# Patient Record
Sex: MALE | Race: WHITE | HISPANIC OR LATINO | Employment: FULL TIME | URBAN - METROPOLITAN AREA
[De-identification: names, ages, dates, MRNs, and addresses within clinical notes are randomized per-mention and may not be internally consistent; named-entity substitution may affect disease eponyms.]

---

## 2017-08-09 ENCOUNTER — TRANSCRIBE ORDERS (OUTPATIENT)
Dept: ADMINISTRATIVE | Facility: HOSPITAL | Age: 62
End: 2017-08-09

## 2017-08-09 ENCOUNTER — APPOINTMENT (OUTPATIENT)
Dept: LAB | Facility: HOSPITAL | Age: 62
End: 2017-08-09
Payer: COMMERCIAL

## 2017-08-09 DIAGNOSIS — Z00.8 HEALTH EXAMINATION IN POPULATION SURVEY: ICD-10-CM

## 2017-08-09 DIAGNOSIS — Z00.8 HEALTH EXAMINATION IN POPULATION SURVEY: Primary | ICD-10-CM

## 2017-08-09 LAB
CHOLEST SERPL-MCNC: 223 MG/DL (ref 50–200)
EST. AVERAGE GLUCOSE BLD GHB EST-MCNC: 131 MG/DL
HBA1C MFR BLD: 6.2 % (ref 4.2–6.3)
HDLC SERPL-MCNC: 37 MG/DL (ref 40–60)
LDLC SERPL CALC-MCNC: 155 MG/DL (ref 0–100)
TRIGL SERPL-MCNC: 153 MG/DL

## 2017-08-09 PROCEDURE — 36415 COLL VENOUS BLD VENIPUNCTURE: CPT | Performed by: PREVENTIVE MEDICINE

## 2017-08-09 PROCEDURE — 80061 LIPID PANEL: CPT

## 2017-08-09 PROCEDURE — 83036 HEMOGLOBIN GLYCOSYLATED A1C: CPT | Performed by: PREVENTIVE MEDICINE

## 2017-08-15 ENCOUNTER — ALLSCRIPTS OFFICE VISIT (OUTPATIENT)
Dept: OTHER | Facility: OTHER | Age: 62
End: 2017-08-15

## 2017-08-15 DIAGNOSIS — E04.1 NONTOXIC SINGLE THYROID NODULE: ICD-10-CM

## 2017-08-15 DIAGNOSIS — R68.82 DECREASED LIBIDO: ICD-10-CM

## 2017-08-18 ENCOUNTER — HOSPITAL ENCOUNTER (OUTPATIENT)
Dept: RADIOLOGY | Facility: HOSPITAL | Age: 62
Discharge: HOME | End: 2017-08-18
Payer: COMMERCIAL

## 2017-08-18 DIAGNOSIS — E04.1 NONTOXIC SINGLE THYROID NODULE: ICD-10-CM

## 2017-08-18 PROCEDURE — 76536 US EXAM OF HEAD AND NECK: CPT

## 2017-08-21 ENCOUNTER — GENERIC CONVERSION - ENCOUNTER (OUTPATIENT)
Dept: OTHER | Facility: OTHER | Age: 62
End: 2017-08-21

## 2017-08-25 ENCOUNTER — APPOINTMENT (OUTPATIENT)
Dept: LAB | Facility: HOSPITAL | Age: 62
End: 2017-08-25
Payer: COMMERCIAL

## 2017-08-25 ENCOUNTER — TRANSCRIBE ORDERS (OUTPATIENT)
Dept: ADMINISTRATIVE | Facility: HOSPITAL | Age: 62
End: 2017-08-25

## 2017-08-25 DIAGNOSIS — E04.1 NONTOXIC UNINODULAR GOITER: ICD-10-CM

## 2017-08-25 DIAGNOSIS — E04.1 NONTOXIC SINGLE THYROID NODULE: ICD-10-CM

## 2017-08-25 DIAGNOSIS — R68.82 DECREASED LIBIDO: ICD-10-CM

## 2017-08-25 DIAGNOSIS — E04.1 NONTOXIC UNINODULAR GOITER: Primary | ICD-10-CM

## 2017-08-25 LAB
PSA SERPL-MCNC: 2.4 NG/ML (ref 0–4)
TSH SERPL DL<=0.05 MIU/L-ACNC: 2.77 UIU/ML (ref 0.36–3.74)

## 2017-08-25 PROCEDURE — 36415 COLL VENOUS BLD VENIPUNCTURE: CPT

## 2017-08-25 PROCEDURE — 84402 ASSAY OF FREE TESTOSTERONE: CPT

## 2017-08-25 PROCEDURE — G0103 PSA SCREENING: HCPCS

## 2017-08-25 PROCEDURE — 84443 ASSAY THYROID STIM HORMONE: CPT

## 2017-08-25 PROCEDURE — 84403 ASSAY OF TOTAL TESTOSTERONE: CPT

## 2017-08-26 ENCOUNTER — GENERIC CONVERSION - ENCOUNTER (OUTPATIENT)
Dept: OTHER | Facility: OTHER | Age: 62
End: 2017-08-26

## 2017-08-26 LAB
TESTOST FREE SERPL-MCNC: 9.3 PG/ML (ref 6.6–18.1)
TESTOST SERPL-MCNC: 286 NG/DL (ref 264–916)

## 2017-10-31 ENCOUNTER — ALLSCRIPTS OFFICE VISIT (OUTPATIENT)
Dept: OTHER | Facility: OTHER | Age: 62
End: 2017-10-31

## 2017-11-01 NOTE — PROGRESS NOTES
Assessment  1  Decreased libido (799 81) (R68 82)   2  GERD (gastroesophageal reflux disease) (530 81) (K21 9)   3  Hyperlipidemia LDL goal <130 (272 4) (E78 5)   4  Prediabetes (790 29) (R73 03)   5  Situational anxiety (300 09) (F41 8)    Assessment plan 1  Decreased libido we did check the testosterone level that was normal number sign 2 GERD currently stable continue with omeprazole  3 hyperlipidemia I recommended the patient follow step 1 cardiac diet I would like the patient is check a lipid profile in 6 months 4 prediabetes check labs hemoglobin A1c and fasting blood sugar patient will get a flu shot at work next  5   Situational anxiety patient declines medical treatment and declines counseling no suicidal ideation he will monitor his symptoms if they worsen he will let me know and consider treatment RTO in 6 months call if any problems  Chief Complaint  Chief Complaint Chronic Condition [facility]: Patient is here today for follow up of chronic conditions described in HPI  History of Present Illness  HPI: 23-year-old male coming in for a follow-up examination hyperlipidemia, prediabetes and decreased libido; the patient reports me that cut down on sweets and exercising 3/week and walking at the hospital no er no hospitalization the pt reports on glucosamine for the last 1 5 yrs  No hospitalizations, no ER visits, patient reports me work stress he is concerned about downsizing this job and also stress at home his mom and after getting older and live in [place] a may need a nursing home in near future  No suicidal ideation no depression he does not want treatment for anxiety at this point time      Review of Systems  Complete-Male:   Constitutional: No fever or chills, feels well, no tiredness, no recent weight gain or weight loss  Cardiovascular: No complaints of slow heart rate, no fast heart rate, no chest pain, no palpitations, no leg claudication, no lower extremity     Respiratory: No complaints of shortness of breath, no wheezing, no cough, no SOB on exertion, no orthopnea or PND  Gastrointestinal: No complaints of abdominal pain, no constipation, no nausea or vomiting, no diarrhea or bloody stools  Genitourinary: No complaints of dysuria, no incontinence, no hesitancy, no nocturia, no genital lesion, no testicular pain  Psychiatric: anxiety, but-- not suicidal-- and-- no depression  ROS Reviewed:   ROS reviewed  Active Problems  1  BMI 27 0-27 9,adult (V85 23) (Z68 27)   2  Decreased libido (799 81) (R68 82)   3  Diabetes mellitus screening (V77 1) (Z13 1)   4  Encounter for prostate cancer screening (V76 44) (Z12 5)   5  Encounter for screening for malignant neoplasm of colon (V76 51) (Z12 11)   6  Erectile dysfunction of non-organic origin (302 72) (F52 21)   7  GERD (gastroesophageal reflux disease) (530 81) (K21 9)   8  Hyperlipidemia LDL goal <130 (272 4) (E78 5)   9  Normal routine physical examination (V70 0) (Z00 00)   10  Prediabetes (790 29) (R73 03)   11  Screening for cardiovascular condition (V81 2) (Z13 6)   12  Thyroid nodule (241 0) (E04 1)    Past Medical History  1  History of Elevated blood pressure   2  History of Elevated lipids (272 4) (E78 5)   3  History of abdominal pain (V13 89) (Z87 898)   4  History of benign neoplasm of colon (V12 79) (Z86 018)   5  History of chest pain (V13 89) (Z87 898)   6  History of heartburn (V12 79) (X77 295)  Active Problems And Past Medical History Reviewed: The active problems and past medical history were reviewed and updated today  Surgical History  1  History of Inguinal Hernia Repair   2  History of Tonsillectomy  Surgical History Reviewed: The surgical history was reviewed and updated today  Family History  Family History    1  Family history of hypertension (V17 49) (Z82 49)  Family History Reviewed: The family history was reviewed and updated today         Social History   · Dog   · Former smoker (F73 58) (Z87 891)   ·    · Occasional alcohol use   · Primary spoken language [place]  Social History Reviewed: The social history was reviewed and updated today  The social history was reviewed and is unchanged  Current Meds   1  Omeprazole 20 MG Oral Capsule Delayed Release; take 1 capsule daily as needed; Therapy: 36AAS2304 to (Evaluate:20Vzg2773)  Requested for: 68Xzz7942 Recorded  Medication List Reviewed: The medication list was reviewed and updated today  Allergies  1  No Known Drug Allergies  2  No Known Environmental Allergies   3  No Known Food Allergies    Vitals  Vital Signs    Recorded: 40RYQ2860 03:18PM   Heart Rate 78   Respiration 16   Systolic 484, LUE, Sitting   Diastolic 84, LUE, Sitting   Height 5 ft 8 in   Weight 181 lb 0 4 oz   BMI Calculated 27 52   BSA Calculated 1 96   O2 Saturation 96     Physical Exam    Constitutional   General appearance: No acute distress, well appearing and well nourished  Eyes   Conjunctiva and lids: No swelling, erythema, or discharge  Pupils and irises: Equal, round and reactive to light  Ears, Nose, Mouth, and Throat   External inspection of ears and nose: Normal     Otoscopic examination: Tympanic membrance translucent with normal light reflex  Canals patent without erythema  Nasal mucosa, septum, and turbinates: Normal without edema or erythema  Oropharynx: Normal with no erythema, edema, exudate or lesions  Pulmonary   Respiratory effort: No increased work of breathing or signs of respiratory distress  Auscultation of lungs: Clear to auscultation, equal breath sounds bilaterally, no wheezes, no rales, no rhonci  Cardiovascular   Auscultation of heart: Normal rate and rhythm, normal S1 and S2, without murmurs  Lymphatic   Palpation of lymph nodes in neck: No lymphadenopathy      Psychiatric   Mood and affect: Normal          Results/Data  (1) PSA (SCREEN) (Dx V76 44 Screen for Prostate Cancer) 93Lrl6201 07:05AM Tom Boo Order Number: [id number]     Test Name Result Flag Reference   PROSTATE SPECIFIC ANTIGEN 2 4 ng/mL  0 0-4 0   American Urological Association Guidelines define biochemical recurrence of prostate cancer as a detectable or rising PSA value post-radical prostatectomy that is greater than or equal to 0 2 ng/mL with a second confirmatory level of greater than or equal to 0 2 ng/mL  (1) TESTOSTERONE, FREE (DIRECT) AND TOTAL 62Hbi2342 07:05AM Skip Sang    Order Number: [id number]     Test Name Result Flag Reference   FREE TESTOSTERONE, DIRECT 9 3 pg/mL  6 6 - 18 1   TESTOSTERONE (TOTAL) 286 ng/dL  264 - 65   Adult male reference interval is based on a population of  healthy nonobese males (BMI <30) between 23and 44years old  [address] [place] [address], [address] [place] [address] [phone]  PMID: [id number]  Performed at:  [address] [place] [address]  [id number]  : Yadiel Pizarro MD, Phone:  [phone]     (1) TSH 76WXB1803 07:05AM [employer] Sang     Test Name Result Flag Reference   TSH 2 775 uIU/mL  0 358-3 740   Patients undergoing fluorescein dye angiography may retain small amounts of fluorescein in the body for 48-72 hours post procedure  Samples containing fluorescein can produce falsely depressed TSH values  If the patient had this procedure,a specimen should be resubmitted post fluorescein clearance  [address] [facility] THYROID 01MRH4720 12:54PM Tomsharri Haddad Order Number: [id number]    - Patient Instructions: To schedule this appointment, please contact Central Scheduling at [phone]  Test Name Result Flag Reference   US THYROID (Report)     THYROID ULTRASOUND     INDICATION: Possible thyroid enlargement on physical exam      COMPARISON: None       TECHNIQUE:  Ultrasound of the thyroid was performed with a high frequency linear transducer in transverse and sagittal planes including volumetric imaging sweeps as well as traditional still imaging technique  FINDINGS:   Normal homogeneous smooth echotexture  Right gland: 4 4 x 1 5 x 1 7 cm  No dominant nodules  Left gland: 4 3 x 1 3 x 1 7 cm  No dominant nodules  Isthmus: The isthmus is 0 3 cm in AP dimension  IMPRESSION:      Normal study  Workstation performed: [id number]     Signed by: Lise Dejesus MD   8/21/17     Health Management  Encounter for screening for malignant neoplasm of colon   COLONOSCOPY; every 3 years; Last 02FHV7384; Next Due: 10KJP7417;  Active    Signatures   Electronically signed by : Argelia Pike DO; Oct 31 2017  4:03PM EST                       (Author)

## 2018-01-10 NOTE — RESULT NOTES
Message   Notify the patient the ultrasound of the thyroid is normal Follow-up as scheduled to discuss report        Verified Results  US THYROID 45Kes2876 12:54PM Braden Mcfadden Order Number: [id number]    - Patient Instructions: To schedule this appointment, please contact Central Scheduling at [phone]  Test Name Result Flag Reference   US THYROID (Report)     THYROID ULTRASOUND     INDICATION: Possible thyroid enlargement on physical exam      COMPARISON: None  TECHNIQUE:  Ultrasound of the thyroid was performed with a high frequency linear transducer in transverse and sagittal planes including volumetric imaging sweeps as well as traditional still imaging technique  FINDINGS:   Normal homogeneous smooth echotexture  Right gland: 4 4 x 1 5 x 1 7 cm  No dominant nodules  Left gland: 4 3 x 1 3 x 1 7 cm  No dominant nodules  Isthmus: The isthmus is 0 3 cm in AP dimension  IMPRESSION:      Normal study  Workstation performed: [id number]     Signed by:    Nhi Palencia MD   8/21/17       Signatures   Electronically signed by : Marilee Valenzuela DO; Aug 21 2017  7:04PM EST                       (Author)

## 2018-01-10 NOTE — RESULT NOTES
Verified Results  (1) PSA (SCREEN) (Dx V76 44 Screen for Prostate Cancer) 75JVA5044 07:13AM Luis Carlos Castellon     Test Name Result Flag Reference   PROSTATE SPECIFIC ANTIGEN 2 5 ng/mL  0 0-4 0       Discussion/Summary   blood tests for prostate are normal

## 2018-01-13 VITALS
HEART RATE: 78 BPM | BODY MASS INDEX: 27.43 KG/M2 | RESPIRATION RATE: 16 BRPM | SYSTOLIC BLOOD PRESSURE: 110 MMHG | WEIGHT: 181.03 LBS | DIASTOLIC BLOOD PRESSURE: 84 MMHG | OXYGEN SATURATION: 96 % | HEIGHT: 68 IN

## 2018-01-13 NOTE — RESULT NOTES
Message   notify pt stable psa 2 4  f/u as scheduled        Verified Results  (1) PSA (SCREEN) (Dx V76 44 Screen for Prostate Cancer) [id number] 07:05AM Fay Lane Order Number: [id number]     Test Name Result Flag Reference   PROSTATE SPECIFIC ANTIGEN 2 4 ng/mL  0 0-4 0   American Urological Association Guidelines define biochemical recurrence of prostate cancer as a detectable or rising PSA value post-radical prostatectomy that is greater than or equal to 0 2 ng/mL with a second confirmatory level of greater than or equal to 0 2 ng/mL         Signatures   Electronically signed by : Tuan Van DO; Aug 26 2017  5:36PM EST                       (Author)

## 2018-01-14 NOTE — RESULT NOTES
Message   Notify the patient normal TSH follow up as scheduled        Verified Results  (1) PSA (SCREEN) (Dx V76 44 Screen for Prostate Cancer) 31Boh3217 07:05AM Mabel Topete Order Number: [id number]     Test Name Result Flag Reference   PROSTATE SPECIFIC ANTIGEN 2 4 ng/mL  0 0-4 0   American Urological Association Guidelines define biochemical recurrence of prostate cancer as a detectable or rising PSA value post-radical prostatectomy that is greater than or equal to 0 2 ng/mL with a second confirmatory level of greater than or equal to 0 2 ng/mL  (1) TESTOSTERONE, FREE (DIRECT) AND TOTAL 74Qof4800 07:05AM Roxane Dennis    Order Number: [id number]     Test Name Result Flag Reference   FREE TESTOSTERONE, DIRECT 9 3 pg/mL  6 6 - 18 1   TESTOSTERONE (TOTAL) 286 ng/dL  264 - 65   Adult male reference interval is based on a population of  healthy nonobese males (BMI <30) between 23and 44years old  [address] [place] [address] [phone]  PMID: [id number]  Performed at:  [address] [place] [address]  [id number]  : Sharon Valencia MD, Phone:  [phone]     (1) TSH [id number] 07:05AM Roxane Dennis     Test Name Result Flag Reference   TSH 2 775 uIU/mL  0 358-3 740   Patients undergoing fluorescein dye angiography may retain small amounts of fluorescein in the body for 48-72 hours post procedure  Samples containing fluorescein can produce falsely depressed TSH values  If the patient had this procedure,a specimen should be resubmitted post fluorescein clearance         Signatures   Electronically signed by : Colt Gonzalez DO; Aug 26 2017  5:37PM EST                       (Author)

## 2018-01-15 NOTE — PROGRESS NOTES
Assessment    1  Encounter for preventive health examination (V70 0) (Z00 00)   2  Prediabetes (790 29) (R73 03)   3  Thyroid nodule (241 0) (E04 1)   4  Decreased libido (799 81) (R68 82)    Assessment and plan #1 annual wellness examination completed per patient I'll be ordering the patient's comprehensive metabolic panel, hemoglobin A1c, screening PSA, patient is up-to-date on colonoscopy recommend flu vaccine in the fall  #2? Left thyroid nodule we'll check third generation TSH and ultrasound of the thyroid he does report a family history of thyroid nodule sister #3 prediabetes recommended a healthy balanced diet and carbohydrates and sweets recommended routine exercise check a hemoglobin A1c and comprehensive metabolic panel #4 patient reports decreased libido  Check free and total testosterone level RTO in 6 months call if any problems  Plan  Decreased libido    · (1) TESTOSTERONE, FREE (DIRECT) AND TOTAL; Status:Active; Requested  for:06Kdy6214;   Hyperlipidemia LDL goal <130, Prediabetes, Screening for cardiovascular condition    · (1) LIPID PANEL, FASTING; Status:Active; Requested for:64Kjq0408;   Prediabetes    · (1) COMPREHENSIVE METABOLIC PANEL; Status:Active; Requested for:33Bye5004;    · (1) HEMOGLOBIN A1C; Status:Active; Requested for:38Pnp5847; Thyroid nodule    · (1) PSA (SCREEN) (Dx V76 44 Screen for Prostate Cancer); Status:Active; Requested  for:02Hjo0460;    · (Q) TSH, 3RD GENERATION; Status:Active; Requested for:21Ukd7565;    · US THYROID; Status:Hold For - Scheduling; Requested for:36Mud8630;     Discussion/Summary  health maintenance visit Currently, he eats a healthy diet  Chief Complaint  New patient presents today for a wellness  History of Present Illness  HM, Adult Male: The last health maintenance visit was 1 year(s) ago  Social History: Household members include spouse  He is   Work status: working full time and occupation: [employer]   The patient has never smoked cigarettes  He reports occasional alcohol use and drinking 4 drinks per month  The patient has no concerns about alcohol abuse  He has never used illicit drugs  General Health: The patient's health since the last visit is described as good  He has regular dental visits  The patient brushes 4 time(s) a day, flosses 1 time(s) a day and reports his last dental visit was 07/2017  Dental problems: no tooth pain and no caries  He complains of vision problems  Vision care includes wearing glasses, having regular eye examinations and sept annual Dr Toyin Gomez  He denies hearing loss  Hearing is normal   He doesn't wear a hearing aid  Immunizations status: up to date  Lifestyle:  He does not have a healthy diet (eating too late and greasy stuff which helped the gerd uses omeprazole)  Dietary details include 1-2 servings of vegetables per day, 2-3 ounces of water per day and 2 cups of coffee per day  He has weight concerns  Weight control issues: overweight  He exercises regularly  He exercises 1-2 times per week for 45 minutes per session  Exercise includes walking  He does not use tobacco  The patient has never smoked cigarettes  He consumes alcohol  He reports occasional alcohol use and 4 per month  He typically drinks beer, but no wine consumption and no hard liquor consumption  Alcohol concern: The patient has no concerns about alcohol abuse  He denies drug use  He has never used illicit drugs  Reproductive health:  the patient is sexually active  He is monogamous with a female partner  birth control is not being practiced  He denies erectile dysfunction  Screening: Colorectal cancer screening includes a colonoscopy within the past ten years  Safety elements used: seat belt, safe driving habits, sunscreen, smoke detector and carbon monoxide detector, but no CPR training for the patient and no CPR training for household members     Risk findings: no passive smoke exposure, no anxiety symptoms, no depression symptoms, no travel to developing areas and no tuberculosis exposure  HPI: when check bp at [facility] bp elevated , needs new pcp      Active Problems    1  BMI 27 0-27 9,adult (V85 23) (Z68 27)   2  Diabetes mellitus screening (V77 1) (Z13 1)   3  Encounter for prostate cancer screening (V76 44) (Z12 5)   4  Encounter for screening for malignant neoplasm of colon (V76 51) (Z12 11)   5  Erectile dysfunction of non-organic origin (302 72) (F52 21)   6  GERD (gastroesophageal reflux disease) (530 81) (K21 9)   7  Hyperlipidemia LDL goal <130 (272 4) (E78 5)   8  Normal routine physical examination (V70 0) (Z00 00)   9  Screening for cardiovascular condition (V81 2) (Z13 6)    Past Medical History    · History of Elevated blood pressure   · History of Elevated lipids (272 4) (E78 5)   · History of abdominal pain (V13 89) (Z70 789)   · History of benign neoplasm of colon (V12 79) (Z86 018)   · History of chest pain (V13 89) (D19 637)   · History of heartburn (V12 79) (Z87 898)    Surgical History    · History of Inguinal Hernia Repair   · History of Tonsillectomy    Family History  Family History    · Family history of hypertension (V17 49) (Z82 49)    Social History    · Former smoker (V15 82) (U68 008)   ·    · Occasional alcohol use   · Primary spoken language English    Current Meds   1  Omeprazole 20 MG Oral Capsule Delayed Release; take 1 capsule daily as needed; Therapy: 96OGX6491 to (Evaluate:61Qtd8050)  Requested for: 60Xnw0876 Recorded    Allergies    1  No Known Drug Allergies    Vitals   Recorded: 67Ipd0785 04:20PM   Temperature 98 5 F   Heart Rate 67   Respiration 18   Systolic 082   Diastolic 88   Height 5 ft 8 in   Weight 183 lb 0 2 oz   BMI Calculated 27 83   BSA Calculated 1 97   O2 Saturation 98     Physical Exam    Constitutional   General appearance: No acute distress, well appearing and well nourished      Head and Face   Head and face: Normal     Eyes   Conjunctiva and lids: No erythema, swelling or discharge  Pupils and irises: Equal, round, reactive to light  Ears, Nose, Mouth, and Throat   External inspection of ears and nose: Normal     Otoscopic examination: Tympanic membranes translucent with normal light reflex  Canals patent without erythema  Hearing: Normal     Nasal mucosa, septum, and turbinates: Normal without edema or erythema  Lips, teeth, and gums: Normal, good dentition  Oropharynx: Normal with no erythema, edema, exudate or lesions  Neck   Neck: Supple, symmetric, trachea midline, no masses  Thyroid: Abnormal   ?1cm cm single nodule was palpated on the left  Pulmonary   Respiratory effort: No increased work of breathing or signs of respiratory distress  Auscultation of lungs: Clear to auscultation  Cardiovascular   Auscultation of heart: Normal rate and rhythm, normal S1 and S2, no murmurs  Pedal pulses: 2+ bilaterally  Examination of extremities for edema and/or varicosities: Normal     Abdomen   Abdomen: Non-tender, no masses  Liver and spleen: No hepatomegaly or splenomegaly  Lymphatic   Palpation of lymph nodes in neck: No lymphadenopathy  Psychiatric   Mood and affect: Normal        Results/Data  PHQ-2 Adult Depression Screening 82Hpz0259 04:21PM User, [facility]     Test Name Result Flag Reference   PHQ-2 Adult Depression Score 0     Over the last two weeks, how often have you been bothered by any of the following problems? Little interest or pleasure in doing things: Not at all - 0  Feeling down, depressed, or hopeless: Not at all - 0   PHQ-2 Adult Depression Screening Negative         Health Management  Encounter for screening for malignant neoplasm of colon   COLONOSCOPY; every 3 years; Last 16KMF0428; Next Due: 98XLW5960;  Active    Future Appointments    Date/Time Provider Specialty Site   10/31/2017 03:00 PM Joelle Campoverde DO [facility]     Signatures   Electronically signed by : Misha Triana DO; Aug 15 2017  9:21PM EST                       (Author)

## 2018-01-16 NOTE — RESULT NOTES
Message   Notify the patient normal testosterone level follow-up as scheduled        Verified Results  (1) PSA (SCREEN) (Dx V76 44 Screen for Prostate Cancer) 77Ybm9671 07:05AM Marlene Sensor Order Number: [id number]     Test Name Result Flag Reference   PROSTATE SPECIFIC ANTIGEN 2 4 ng/mL  0 0-4 0   American Urological Association Guidelines define biochemical recurrence of prostate cancer as a detectable or rising PSA value post-radical prostatectomy that is greater than or equal to 0 2 ng/mL with a second confirmatory level of greater than or equal to 0 2 ng/mL  (1) TESTOSTERONE, FREE (DIRECT) AND TOTAL 83Qgw5598 07:05AM Servando Valdivia   TW Order Number: [id number]     Test Name Result Flag Reference   FREE TESTOSTERONE, DIRECT 9 3 pg/mL  6 6 - 18 1   TESTOSTERONE (TOTAL) 286 ng/dL  264 - 65   Adult male reference interval is based on a population of  healthy nonobese males (BMI <30) between 23and 44years old  [address] [place] [address], [address] [place] [address] [phone]  PMID: [id number]      Performed at:  [address]  [id number]  : Johnny Humphrey MD, Phone:  [phone]       Signatures   Electronically signed by : Joe Veliz DO; Aug 26 2017  5:36PM EST                       (Author)

## 2018-01-17 NOTE — PROGRESS NOTES
Assessment    1  Normal routine physical examination (V70 0) (Z00 00)   2  BMI 27 0-27 9,adult (V85 23) (Z68 27)   3  Hyperlipidemia LDL goal <130 (272 4) (E78 5)    Plan  Encounter for prostate cancer screening    · (1) PSA (SCREEN) (Dx V76 44 Screen for Prostate Cancer); Status:Active; Requested  for:[id number];     Discussion/Summary  Impression: health maintenance visit  Currently, he eats a healthy diet  Prostate cancer screening: the risks and benefits of prostate cancer screening were discussed  Colorectal cancer screening: the risks and benefits of colorectal cancer screening were discussed  Advice and education were given regarding nutrition  OBTAIN BLOOD TEST FOR PROSTATE CANCER (PSA) SOON  The patient was counseled regarding diagnostic results, instructions for management, risk factor reductions, prognosis  Chief Complaint  Annual Physical      History of Present Illness  HPI: COLON POLyP   REPEAT Wcolonscopy pending  Review of Systems    Constitutional: No fever or chills, feels well, no tiredness, no recent weight gain or weight loss  Eyes: No complaints of eye pain, no red eyes, no discharge from eyes, no itchy eyes  ENT: no complaints of earache, no hearing loss, no nosebleeds, no nasal discharge, no sore throat, no hoarseness  Cardiovascular: No complaints of slow heart rate, no fast heart rate, no chest pain, no palpitations, no leg claudication, no lower extremity  Respiratory: No complaints of shortness of breath, no wheezing, no cough, no SOB on exertion, no orthopnea or PND  Gastrointestinal: No complaints of abdominal pain, no constipation, no nausea or vomiting, no diarrhea or bloody stools  Genitourinary: No complaints of dysuria, no incontinence, no hesitancy, no nocturia, no genital lesion, no testicular pain  Musculoskeletal: No complaints of arthralgia, no myalgias, no joint swelling or stiffness, no limb pain or swelling     Integumentary: No complaints of skin rash or skin lesions, no itching, no skin wound, no dry skin  Neurological: No compliants of headache, no confusion, no convulsions, no numbness or tingling, no dizziness or fainting, no limb weakness, no difficulty walking  Psychiatric: Is not suicidal, no sleep disturbances, no anxiety or depression, no change in personality, no emotional problems  Endocrine: No complaints of proptosis, no hot flashes, no muscle weakness, no erectile dysfunction, no deepening of the voice, no feelings of weakness  Hematologic/Lymphatic: No complaints of swollen glands, no swollen glands in the neck, does not bleed easily, no easy bruising  Active Problems    1  Diabetes mellitus screening (V77 1) (Z13 1)   2  Encounter for screening for malignant neoplasm of colon (V76 51) (Z12 11)   3  Erectile dysfunction of non-organic origin (302 72) (F52 21)   4  GERD (gastroesophageal reflux disease) (530 81) (K21 9)   5  Screening for cardiovascular condition (V81 2) (Z13 6)    Past Medical History    · History of Elevated blood pressure (401 9) (I10)   · History of Elevated lipids (272 4) (E78 5)   · History of abdominal pain (V13 89) (T06 076)   · History of benign neoplasm of colon (V12 79) (Z86 018)   · History of chest pain (V13 89) (A55 415)   · History of heartburn (V12 79) (Z87 898)    Social History    · Former smoker (U69 43) (T97 587)   ·    · Occasional alcohol use   · Primary spoken language English    Current Meds   1  Omeprazole 20 MG Oral Capsule Delayed Release; take 1 capsule daily as needed; Therapy: [id number] to (Evaluate:73Lei2989)  Requested for: 20Iev8608 Recorded    Allergies    1   No Known Drug Allergies    Vitals   Recorded: 92SMS6335 86:80WL   Systolic 415   Diastolic 84   Heart Rate 68   Respiration 18   Temperature 97 1 F   O2 Saturation 98   Height 5 ft 8 in   Weight 182 lb 4 oz   BMI Calculated 27 71   BSA Calculated 1 96     Physical Exam    Constitutional   General appearance: No acute distress, well appearing and well nourished  Eyes   Conjunctiva and lids: No swelling, erythema, or discharge  Pupils and irises: Equal, round and reactive to light  Ears, Nose, Mouth, and Throat   External inspection of ears and nose: Normal     Otoscopic examination: Tympanic membrance translucent with normal light reflex  Canals patent without erythema  Oropharynx: Normal with no erythema, edema, exudate or lesions  Pulmonary   Respiratory effort: No increased work of breathing or signs of respiratory distress  Auscultation of lungs: Clear to auscultation  Cardiovascular   Auscultation of heart: Normal rate and rhythm, normal S1 and S2, without murmurs  Examination of extremities for edema and/or varicosities: Normal     Abdomen   Abdomen: Non-tender, no masses  Lymphatic   Palpation of lymph nodes in neck: No lymphadenopathy  Musculoskeletal   Gait and station: Normal     Digits and nails: Normal without clubbing or cyanosis  Inspection/palpation of joints, bones, and muscles: Normal     Skin   Skin and subcutaneous tissue: Normal without rashes or lesions  Neurologic   Cranial nerves: Cranial nerves 2-12 intact  Reflexes: 2+ and symmetric  Sensation: No sensory loss  Psychiatric   Orientation to person, place and time: Normal     Mood and affect: Normal        Results/Data  (1) LIPID PANEL, FASTING 30Jun2016 07:36AM Sakina Rick     Test Name Result Flag Reference   CHOLESTEROL 238 mg/dL H    HDL,DIRECT 38 mg/dL L 40-60   Specimen collection should occur prior to Metamizole administration due to the potential for falsely depressed results     LDL CHOLESTEROL CALCULATED 165 mg/dL H 0-100   Triglyceride:         Normal              <150 mg/dl       Borderline High    150-199 mg/dl       High               200-499 mg/dl       Very High          >499 mg/dl  Cholesterol:         Desirable        <200 mg/dl      Borderline High  200-239 mg/dl      High >239 mg/dl  HDL Cholesterol:        High    >59 mg/dL      Low     <41 mg/dL  LDL CALCULATED:    This screening LDL is a calculated result  It does not have the accuracy of the Direct Measured LDL in the monitoring of patients with hyperlipidemia and/or statin therapy  Direct Measure LDL (FUB303) must be ordered separately in these patients  TRIGLYCERIDES 174 mg/dL H <=150   Specimen collection should occur prior to N-Acetylcysteine or Metamizole administration due to the potential for falsely depressed results  Procedure    Procedure:   Results: 20/25 in both eyes without corrective device, 20/40 in the right eye without corrective device, 20/40 in the left eye without corrective device      Health Management  Encounter for screening for malignant neoplasm of colon   COLONOSCOPY; every 3 years; Last [id number]; Next Due: [id number]; Active    Signatures   Electronically signed by :  Geovany Perry DO; Aug 11 2016  8:11AM EST                       (Author)

## 2018-01-22 VITALS
SYSTOLIC BLOOD PRESSURE: 120 MMHG | OXYGEN SATURATION: 98 % | HEIGHT: 68 IN | HEART RATE: 67 BPM | RESPIRATION RATE: 18 BRPM | DIASTOLIC BLOOD PRESSURE: 88 MMHG | WEIGHT: 183.01 LBS | TEMPERATURE: 98.5 F | BODY MASS INDEX: 27.74 KG/M2

## 2018-02-15 DIAGNOSIS — E78.5 HYPERLIPIDEMIA: ICD-10-CM

## 2018-02-15 DIAGNOSIS — Z13.6 ENCOUNTER FOR SCREENING FOR CARDIOVASCULAR DISORDERS: ICD-10-CM

## 2018-02-15 DIAGNOSIS — R73.03 PREDIABETES: ICD-10-CM

## 2018-04-27 ENCOUNTER — APPOINTMENT (OUTPATIENT)
Dept: LAB | Facility: HOSPITAL | Age: 63
End: 2018-04-27
Payer: COMMERCIAL

## 2018-04-27 ENCOUNTER — TRANSCRIBE ORDERS (OUTPATIENT)
Dept: ADMINISTRATIVE | Facility: HOSPITAL | Age: 63
End: 2018-04-27

## 2018-04-27 DIAGNOSIS — E78.5 HYPERLIPIDEMIA, UNSPECIFIED HYPERLIPIDEMIA TYPE: Primary | ICD-10-CM

## 2018-04-27 DIAGNOSIS — E78.5 HYPERLIPIDEMIA: ICD-10-CM

## 2018-04-27 DIAGNOSIS — R73.03 DIABETES MELLITUS, LATENT: ICD-10-CM

## 2018-04-27 DIAGNOSIS — Z13.6 ENCOUNTER FOR SCREENING FOR CARDIOVASCULAR DISORDERS: ICD-10-CM

## 2018-04-27 DIAGNOSIS — R73.03 PREDIABETES: ICD-10-CM

## 2018-04-27 LAB
ALBUMIN SERPL BCP-MCNC: 4.1 G/DL (ref 3.5–5)
ALP SERPL-CCNC: 68 U/L (ref 46–116)
ALT SERPL W P-5'-P-CCNC: 59 U/L (ref 12–78)
ANION GAP SERPL CALCULATED.3IONS-SCNC: 12 MMOL/L (ref 4–13)
AST SERPL W P-5'-P-CCNC: 28 U/L (ref 5–45)
BILIRUB SERPL-MCNC: 0.5 MG/DL (ref 0.2–1)
BUN SERPL-MCNC: 16 MG/DL (ref 5–25)
CALCIUM SERPL-MCNC: 9.3 MG/DL (ref 8.3–10.1)
CHLORIDE SERPL-SCNC: 106 MMOL/L (ref 100–108)
CHOLEST SERPL-MCNC: 244 MG/DL (ref 50–200)
CO2 SERPL-SCNC: 23 MMOL/L (ref 21–32)
CREAT SERPL-MCNC: 1.22 MG/DL (ref 0.6–1.3)
EST. AVERAGE GLUCOSE BLD GHB EST-MCNC: 131 MG/DL
GFR SERPL CREATININE-BSD FRML MDRD: 63 ML/MIN/1.73SQ M
GLUCOSE P FAST SERPL-MCNC: 116 MG/DL (ref 65–99)
HBA1C MFR BLD: 6.2 % (ref 4.2–6.3)
HDLC SERPL-MCNC: 33 MG/DL (ref 40–60)
LDLC SERPL CALC-MCNC: 179 MG/DL (ref 0–100)
NONHDLC SERPL-MCNC: 211 MG/DL
POTASSIUM SERPL-SCNC: 4 MMOL/L (ref 3.5–5.3)
PROT SERPL-MCNC: 8 G/DL (ref 6.4–8.2)
SODIUM SERPL-SCNC: 141 MMOL/L (ref 136–145)
TRIGL SERPL-MCNC: 162 MG/DL

## 2018-04-27 PROCEDURE — 36415 COLL VENOUS BLD VENIPUNCTURE: CPT | Performed by: INTERNAL MEDICINE

## 2018-04-27 PROCEDURE — 80061 LIPID PANEL: CPT

## 2018-04-27 PROCEDURE — 80053 COMPREHEN METABOLIC PANEL: CPT

## 2018-04-27 PROCEDURE — 83036 HEMOGLOBIN GLYCOSYLATED A1C: CPT | Performed by: INTERNAL MEDICINE

## 2018-05-03 RX ORDER — OMEPRAZOLE 20 MG/1
1 CAPSULE, DELAYED RELEASE ORAL DAILY PRN
COMMUNITY
Start: 2014-07-31 | End: 2018-09-11 | Stop reason: ALTCHOICE

## 2018-05-10 ENCOUNTER — OFFICE VISIT (OUTPATIENT)
Dept: INTERNAL MEDICINE CLINIC | Facility: CLINIC | Age: 63
End: 2018-05-10
Payer: COMMERCIAL

## 2018-05-10 VITALS
SYSTOLIC BLOOD PRESSURE: 140 MMHG | BODY MASS INDEX: 28.22 KG/M2 | OXYGEN SATURATION: 94 % | HEART RATE: 72 BPM | WEIGHT: 185.6 LBS | DIASTOLIC BLOOD PRESSURE: 80 MMHG

## 2018-05-10 DIAGNOSIS — R73.03 PREDIABETES: ICD-10-CM

## 2018-05-10 DIAGNOSIS — E78.5 HYPERLIPIDEMIA, UNSPECIFIED HYPERLIPIDEMIA TYPE: Primary | ICD-10-CM

## 2018-05-10 DIAGNOSIS — K21.9 GASTROESOPHAGEAL REFLUX DISEASE WITHOUT ESOPHAGITIS: ICD-10-CM

## 2018-05-10 PROCEDURE — 99214 OFFICE O/P EST MOD 30 MIN: CPT | Performed by: INTERNAL MEDICINE

## 2018-05-10 RX ORDER — ROSUVASTATIN CALCIUM 5 MG/1
5 TABLET, COATED ORAL DAILY
Qty: 30 TABLET | Refills: 3 | Status: SHIPPED | OUTPATIENT
Start: 2018-05-10 | End: 2019-05-22 | Stop reason: SDUPTHER

## 2018-05-13 PROBLEM — E78.5 HYPERLIPIDEMIA: Status: ACTIVE | Noted: 2018-05-13

## 2018-05-13 PROBLEM — E66.3 OVERWEIGHT (BMI 25.0-29.9): Status: ACTIVE | Noted: 2018-05-13

## 2018-05-13 PROBLEM — R73.03 PREDIABETES: Status: ACTIVE | Noted: 2018-05-13

## 2018-05-13 NOTE — ASSESSMENT & PLAN NOTE
Pre diabetes -I have counseled the patient to follow a healthy balanced diet, I have counseled patient reduce carbohydrates and sweets in the diet, I would like the patient exercise routinely  I will be checking hemoglobin A1c and comprehensive metabolic panel  Have counseled patient about the prevention of diabetes, and the risk of progression to type 2 diabetes

## 2018-08-17 ENCOUNTER — APPOINTMENT (OUTPATIENT)
Dept: LAB | Facility: HOSPITAL | Age: 63
End: 2018-08-17
Payer: COMMERCIAL

## 2018-08-17 ENCOUNTER — TRANSCRIBE ORDERS (OUTPATIENT)
Dept: ADMINISTRATIVE | Facility: HOSPITAL | Age: 63
End: 2018-08-17

## 2018-08-17 DIAGNOSIS — R73.03 PREDIABETES: ICD-10-CM

## 2018-08-17 DIAGNOSIS — E78.5 HYPERLIPIDEMIA, UNSPECIFIED HYPERLIPIDEMIA TYPE: ICD-10-CM

## 2018-08-17 DIAGNOSIS — Z00.8 HEALTH EXAMINATION IN POPULATION SURVEY: Primary | ICD-10-CM

## 2018-08-17 DIAGNOSIS — Z00.8 HEALTH EXAMINATION IN POPULATION SURVEY: ICD-10-CM

## 2018-08-17 LAB
ALBUMIN SERPL BCP-MCNC: 4 G/DL (ref 3.5–5)
ALP SERPL-CCNC: 58 U/L (ref 46–116)
ALT SERPL W P-5'-P-CCNC: 60 U/L (ref 12–78)
ANION GAP SERPL CALCULATED.3IONS-SCNC: 10 MMOL/L (ref 4–13)
AST SERPL W P-5'-P-CCNC: 31 U/L (ref 5–45)
BILIRUB SERPL-MCNC: 0.5 MG/DL (ref 0.2–1)
BUN SERPL-MCNC: 17 MG/DL (ref 5–25)
CALCIUM SERPL-MCNC: 8.7 MG/DL (ref 8.3–10.1)
CHLORIDE SERPL-SCNC: 105 MMOL/L (ref 100–108)
CHOLEST SERPL-MCNC: 176 MG/DL (ref 50–200)
CO2 SERPL-SCNC: 24 MMOL/L (ref 21–32)
CREAT SERPL-MCNC: 1.24 MG/DL (ref 0.6–1.3)
EST. AVERAGE GLUCOSE BLD GHB EST-MCNC: 128 MG/DL
GFR SERPL CREATININE-BSD FRML MDRD: 62 ML/MIN/1.73SQ M
GLUCOSE P FAST SERPL-MCNC: 111 MG/DL (ref 65–99)
HBA1C MFR BLD: 6.1 % (ref 4.2–6.3)
HDLC SERPL-MCNC: 37 MG/DL (ref 40–60)
LDLC SERPL CALC-MCNC: 112 MG/DL (ref 0–100)
POTASSIUM SERPL-SCNC: 4 MMOL/L (ref 3.5–5.3)
PROT SERPL-MCNC: 7.6 G/DL (ref 6.4–8.2)
SODIUM SERPL-SCNC: 139 MMOL/L (ref 136–145)
TRIGL SERPL-MCNC: 133 MG/DL

## 2018-08-17 PROCEDURE — 80061 LIPID PANEL: CPT

## 2018-08-17 PROCEDURE — 80053 COMPREHEN METABOLIC PANEL: CPT

## 2018-08-17 PROCEDURE — 36415 COLL VENOUS BLD VENIPUNCTURE: CPT

## 2018-08-17 PROCEDURE — 83036 HEMOGLOBIN GLYCOSYLATED A1C: CPT

## 2018-09-11 ENCOUNTER — OFFICE VISIT (OUTPATIENT)
Dept: INTERNAL MEDICINE CLINIC | Facility: CLINIC | Age: 63
End: 2018-09-11
Payer: COMMERCIAL

## 2018-09-11 VITALS
SYSTOLIC BLOOD PRESSURE: 120 MMHG | BODY MASS INDEX: 30.09 KG/M2 | DIASTOLIC BLOOD PRESSURE: 92 MMHG | OXYGEN SATURATION: 96 % | HEART RATE: 69 BPM | TEMPERATURE: 98.4 F | HEIGHT: 65 IN | WEIGHT: 180.6 LBS

## 2018-09-11 DIAGNOSIS — E66.3 OVERWEIGHT (BMI 25.0-29.9): ICD-10-CM

## 2018-09-11 DIAGNOSIS — Z23 NEED FOR TDAP VACCINATION: ICD-10-CM

## 2018-09-11 DIAGNOSIS — Z12.11 SCREENING FOR MALIGNANT NEOPLASM OF COLON: ICD-10-CM

## 2018-09-11 DIAGNOSIS — E78.5 HYPERLIPIDEMIA, UNSPECIFIED HYPERLIPIDEMIA TYPE: ICD-10-CM

## 2018-09-11 DIAGNOSIS — K21.9 GASTROESOPHAGEAL REFLUX DISEASE WITHOUT ESOPHAGITIS: ICD-10-CM

## 2018-09-11 DIAGNOSIS — R73.03 PREDIABETES: Primary | ICD-10-CM

## 2018-09-11 PROCEDURE — 3008F BODY MASS INDEX DOCD: CPT | Performed by: INTERNAL MEDICINE

## 2018-09-11 PROCEDURE — 90715 TDAP VACCINE 7 YRS/> IM: CPT

## 2018-09-11 PROCEDURE — 90471 IMMUNIZATION ADMIN: CPT

## 2018-09-11 PROCEDURE — 99214 OFFICE O/P EST MOD 30 MIN: CPT | Performed by: INTERNAL MEDICINE

## 2018-09-11 NOTE — PROGRESS NOTES
Assessment/Plan:           Problem List Items Addressed This Visit        Digestive    GERD (gastroesophageal reflux disease)     Currently minimal symptoms and very infrequently will discontinue the Prilosec he does not have a history of Belcher's he may use over-the-counter Zantac 150 milligrams once a day as needed recommend ulcer free diet  Relevant Orders    Ambulatory referral to Gastroenterology       Other    Hyperlipidemia     Hyperlipidemia controlled continue with current medical regiment recommend a low-cholesterol diet and recommend routine exercise we will continue to monitor the progress  Relevant Orders    Lipid Panel with Direct LDL reflex    Prediabetes - Primary     Pre diabetes -I have counseled the patient to follow a healthy balanced diet, I have counseled patient reduce carbohydrates and sweets in the diet, I would like the patient exercise routinely  I will be checking hemoglobin A1c and comprehensive metabolic panel  Have counseled patient about the prevention of diabetes, and the risk of progression to type 2 diabetes     Review the laboratories         Relevant Orders    Hemoglobin A1C    Overweight (BMI 25 0-29  9)     I have counselled the pt to follow a healthy and balanced diet ,and recommend routine exercise  Relevant Orders    Comprehensive metabolic panel    Lipid Panel with Direct LDL reflex      Other Visit Diagnoses     Need for Tdap vaccination        Relevant Orders    Tdap vaccine greater than or equal to 8yo IM (Completed)    Screening for malignant neoplasm of colon        Relevant Orders    Ambulatory referral to Gastroenterology          Return to office 6  months  call if any problems  Subjective:      Patient ID: Amrita Ortiz is a 59 y o  male  HPI 60-year old male coming in for a follow up visit regarding prediabetes, overweight, hyperlipidemia, GERD, need for Tdap, screening for colon cancer;  The patient reports me compliant taking medications without untoward side effects the  The patient is here to review his medical condition, update me on the medical condition and the patient reports me no hospitalizations and no ER visits  He is here for review of laboratories  He reports me improvements in his diet, he continues to remain active  Overall he is feeling well he plans to get his flu shot at work  Reports me his symptoms of GERD are very minimal and infrequent  He has never had Belcher's esophagus in the past     The following portions of the patient's history were reviewed and updated as appropriate: allergies, current medications, past family history, past medical history, past social history, past surgical history and problem list     Review of Systems   Constitutional: Negative for activity change, appetite change and unexpected weight change  HENT: Negative for congestion and postnasal drip  Eyes: Negative for visual disturbance  Respiratory: Negative for cough and shortness of breath  Cardiovascular: Negative for chest pain  Gastrointestinal: Negative for abdominal pain, diarrhea, nausea and vomiting  Neurological: Negative for dizziness, light-headedness and headaches  Hematological: Negative for adenopathy  Objective:                    No Follow-up on file  No Known Allergies    Past Medical History:   Diagnosis Date    Benign neoplasm of colon      Past Surgical History:   Procedure Laterality Date    INGUINAL HERNIA REPAIR      TONSILLECTOMY       Current Outpatient Prescriptions on File Prior to Visit   Medication Sig Dispense Refill    rosuvastatin (CRESTOR) 5 mg tablet Take 1 tablet (5 mg total) by mouth daily 30 tablet 3    [DISCONTINUED] omeprazole (PriLOSEC) 20 mg delayed release capsule Take 1 capsule by mouth daily as needed       No current facility-administered medications on file prior to visit        Family History   Problem Relation Age of Onset    Hypertension Family Social History     Social History    Marital status: /Civil Union     Spouse name: N/A    Number of children: N/A    Years of education: N/A     Occupational History    Not on file  Social History Main Topics    Smoking status: Never Smoker    Smokeless tobacco: Never Used    Alcohol use Yes      Comment: occasional     Drug use: Unknown    Sexual activity: Not on file     Other Topics Concern    Not on file     Social History Narrative    Dog      Vitals:    09/11/18 1639   BP: 120/92   Pulse: 69   Temp: 98 4 °F (36 9 °C)   SpO2: 96%   Weight: 81 9 kg (180 lb 9 6 oz)   Height: 5' 5" (1 651 m)     Results for orders placed or performed in visit on 08/17/18   Hemoglobin A1C   Result Value Ref Range    Hemoglobin A1C 6 1 4 2 - 6 3 %     mg/dl     Weight (last 2 days)     Date/Time   Weight    09/11/18 1639  81 9 (180 6)            Body mass index is 30 05 kg/m²  BP      Temp      Pulse     Resp      SpO2        Vitals:    09/11/18 1639   Weight: 81 9 kg (180 lb 9 6 oz)     Vitals:    09/11/18 1639   Weight: 81 9 kg (180 lb 9 6 oz)       /92   Pulse 69   Temp 98 4 °F (36 9 °C)   Ht 5' 5" (1 651 m)   Wt 81 9 kg (180 lb 9 6 oz)   SpO2 96%   BMI 30 05 kg/m²          Physical Exam   Constitutional: He appears well-developed and well-nourished  No distress  HENT:   Head: Normocephalic and atraumatic  Right Ear: External ear normal    Left Ear: External ear normal    Mouth/Throat: Oropharynx is clear and moist    Eyes: Conjunctivae are normal  Pupils are equal, round, and reactive to light  Right eye exhibits no discharge  Left eye exhibits no discharge  No scleral icterus  Neck: Neck supple  Cardiovascular: Normal rate, regular rhythm and normal heart sounds  Exam reveals no gallop and no friction rub  No murmur heard  Pulmonary/Chest: No respiratory distress  He has no wheezes  He has no rales  Abdominal: Soft   Bowel sounds are normal  He exhibits no distension and no mass  There is no tenderness  There is no rebound and no guarding  Musculoskeletal: He exhibits no edema or deformity  Lymphadenopathy:     He has no cervical adenopathy  Neurological: He is alert  Skin: He is not diaphoretic  Psychiatric: He has a normal mood and affect

## 2018-09-12 NOTE — ASSESSMENT & PLAN NOTE
Pre diabetes -I have counseled the patient to follow a healthy balanced diet, I have counseled patient reduce carbohydrates and sweets in the diet, I would like the patient exercise routinely  I will be checking hemoglobin A1c and comprehensive metabolic panel  Have counseled patient about the prevention of diabetes, and the risk of progression to type 2 diabetes     Review the laboratories

## 2018-09-12 NOTE — ASSESSMENT & PLAN NOTE
Currently minimal symptoms and very infrequently will discontinue the Prilosec he does not have a history of Belcher's he may use over-the-counter Zantac 150 milligrams once a day as needed recommend ulcer free diet

## 2018-11-21 ENCOUNTER — OFFICE VISIT (OUTPATIENT)
Dept: GASTROENTEROLOGY | Facility: AMBULARY SURGERY CENTER | Age: 63
End: 2018-11-21
Payer: COMMERCIAL

## 2018-11-21 VITALS
TEMPERATURE: 98.5 F | HEART RATE: 75 BPM | BODY MASS INDEX: 30.12 KG/M2 | DIASTOLIC BLOOD PRESSURE: 78 MMHG | WEIGHT: 180.8 LBS | SYSTOLIC BLOOD PRESSURE: 122 MMHG | HEIGHT: 65 IN

## 2018-11-21 DIAGNOSIS — K21.9 GASTROESOPHAGEAL REFLUX DISEASE WITHOUT ESOPHAGITIS: ICD-10-CM

## 2018-11-21 DIAGNOSIS — Z12.11 SCREENING FOR MALIGNANT NEOPLASM OF COLON: ICD-10-CM

## 2018-11-21 DIAGNOSIS — Z86.010 HISTORY OF COLON POLYPS: Primary | ICD-10-CM

## 2018-11-21 PROBLEM — Z86.0100 HISTORY OF COLON POLYPS: Status: ACTIVE | Noted: 2018-11-21

## 2018-11-21 PROCEDURE — 99244 OFF/OP CNSLTJ NEW/EST MOD 40: CPT | Performed by: INTERNAL MEDICINE

## 2018-11-21 NOTE — PROGRESS NOTES
Consultation - [address] [facility]  [facility] 1955 male         Chief Complaint:  History of colon polyps    HPI:  59-year-old male with history of hyperlipidemia had large mid ascending colon polyp removed back in 2014  He had repeat colonoscopy at that time for complete polypectomy  He also had upper endoscopy at that time  He had another colonoscopy in 2015 and had couple of polyps removed  Patient has regular bowel movements and denies any blood or mucus in the stool  Appetite is good and denies any recent weight loss  Denies any abdominal pain, nausea, or vomiting  Has occasional heartburn and he takes Tums as needed  Denies any difficulty swallowing  REVIEW OF SYSTEMS: Review of Systems   Constitutional: Negative for activity change, appetite change, chills, diaphoresis, fatigue, fever and unexpected weight change  HENT: Negative for ear discharge, ear pain, facial swelling, hearing loss, nosebleeds, sore throat, tinnitus and voice change  Eyes: Negative for pain, discharge, redness, itching and visual disturbance  Respiratory: Negative for apnea, cough, chest tightness, shortness of breath and wheezing  Cardiovascular: Negative for chest pain and palpitations  Gastrointestinal:        As noted in HPI   Endocrine: Negative for cold intolerance, heat intolerance and polyuria  Genitourinary: Negative for difficulty urinating, dysuria, flank pain, hematuria and urgency  Musculoskeletal: Negative for arthralgias, back pain, gait problem, joint swelling and myalgias  Skin: Negative for rash and wound  Neurological: Negative for dizziness, tremors, seizures, speech difficulty, light-headedness, numbness and headaches  Hematological: Negative for adenopathy  Does not bruise/bleed easily  Psychiatric/Behavioral: Negative for agitation, behavioral problems and confusion  The patient is not nervous/anxious           Past Medical History:   Diagnosis Date  Benign neoplasm of colon       Past Surgical History:   Procedure Laterality Date    INGUINAL HERNIA REPAIR      TONSILLECTOMY       Social History     Social History    Marital status: /Civil Union     Spouse name: N/A    Number of children: N/A    Years of education: N/A     Occupational History    Not on file  Social History Main Topics    Smoking status: Never Smoker    Smokeless tobacco: Never Used    Alcohol use Yes      Comment: occasional     Drug use: No    Sexual activity: Not on file     Other Topics Concern    Not on file     Social History Narrative    Dog      Family History   Problem Relation Age of Onset    Hypertension Family     Parkinsonism Father      Patient has no known allergies  Current Outpatient Prescriptions   Medication Sig Dispense Refill    rosuvastatin (CRESTOR) 5 mg tablet Take 1 tablet (5 mg total) by mouth daily 30 tablet 3    Na Sulfate-K Sulfate-Mg Sulf (SUPREP BOWEL PREP KIT) 17 5-3 13-1 6 GM/177ML SOLN Take 2 Bottles by mouth see administration instructions Please follow the instructions from the office 2 Bottle 0     No current facility-administered medications for this visit  Blood pressure 122/78, pulse 75, temperature 98 5 °F (36 9 °C), temperature source Tympanic, height 5' 5" (1 651 m), weight 82 kg (180 lb 12 8 oz)  PHYSICAL EXAM: Physical Exam   Constitutional: He is oriented to person, place, and time  He appears well-developed  HENT:   Head: Normocephalic and atraumatic  Mouth/Throat: Oropharynx is clear and moist    Eyes: Pupils are equal, round, and reactive to light  Conjunctivae are normal  Right eye exhibits no discharge  Left eye exhibits no discharge  No scleral icterus  Neck: Neck supple  No JVD present  No tracheal deviation present  No thyromegaly present  Cardiovascular: Normal rate, regular rhythm, normal heart sounds and intact distal pulses  Exam reveals no gallop and no friction rub      No murmur heard   Pulmonary/Chest: Effort normal and breath sounds normal  No respiratory distress  He has no wheezes  He has no rales  He exhibits no tenderness  Abdominal: Soft  Bowel sounds are normal  He exhibits no distension and no mass  There is no tenderness  There is no rebound and no guarding  No hernia  Musculoskeletal: He exhibits no edema  Lymphadenopathy:     He has no cervical adenopathy  Neurological: He is alert and oriented to person, place, and time  Skin: Skin is warm and dry  No rash noted  No erythema  Psychiatric: He has a normal mood and affect  His behavior is normal  Thought content normal         No results found for: WBC, HGB, HCT, MCV, PLT  Lab Results   Component Value Date    CALCIUM 8 7 08/17/2018    K 4 0 08/17/2018    CO2 24 08/17/2018     08/17/2018    BUN 17 08/17/2018    CREATININE 1 24 08/17/2018     Lab Results   Component Value Date    ALT 60 08/17/2018    AST 31 08/17/2018    ALKPHOS 58 08/17/2018     No results found for: INR, PROTIME    Us Thyroid    Result Date: 8/21/2017  Impression:  Normal study  Workstation performed: [id number]       ASSESSMENT & PLAN:    History of colon polyps  Personal history of colon polyps- patient is at increased risk for colon cancer screening  Rule out colorectal lesions including polyps or malignancy     -Schedule for colonoscopy  -High-fiber diet     -Patient was given instructions about the colonoscopy prep     -Patient was explained about  the risks and benefits of the procedure  Risks including but not limited to bleeding, infection, perforation were explained in detail  Also explained about less than 100% sensitivity with the exam and other alternatives  GERD (gastroesophageal reflux disease)  Gastroesophageal reflux disease - Patient has the symptoms of chronic acid reflux for a long time  Possible hiatal hernia or LES weakness     He had negative upper endoscopy before     -Patient was explained about the lifestyle and dietary modifications  Advised to avoid fatty foods, chocolates, caffeine, alcohol and any other triggering foods    Avoid eating for at least 3 hours before going to bed         -may take Pepcid or Zantac as needed

## 2018-11-21 NOTE — LETTER
November 21, 2018     Yrn Jordan [facility] [id number]    Patient: Johanne Shrestha   YOB: 1955   Date of Visit: 11/21/2018       Dear Dr Cher Gill:    Thank you for referring Kareem Echols to me for evaluation  Below are my notes for this consultation  If you have questions, please do not hesitate to call me  I look forward to following your patient along with you  Sincerely,        Isaiah Sullivan MD        CC: No Recipients  Isaiah Sullivan MD  11/21/2018  4:51 PM  Sign at close encounter  Consultation - [address] [facility]  Johanne Shrestha 1955 male         Chief Complaint:  History of colon polyps    HPI:  62-year-old male with history of hyperlipidemia had large mid ascending colon polyp removed back in 2014  He had repeat colonoscopy at that time for complete polypectomy  He also had upper endoscopy at that time  He had another colonoscopy in 2015 and had couple of polyps removed  Patient has regular bowel movements and denies any blood or mucus in the stool  Appetite is good and denies any recent weight loss  Denies any abdominal pain, nausea, or vomiting  Has occasional heartburn and he takes Tums as needed  Denies any difficulty swallowing  REVIEW OF SYSTEMS: Review of Systems   Constitutional: Negative for activity change, appetite change, chills, diaphoresis, fatigue, fever and unexpected weight change  HENT: Negative for ear discharge, ear pain, facial swelling, hearing loss, nosebleeds, sore throat, tinnitus and voice change  Eyes: Negative for pain, discharge, redness, itching and visual disturbance  Respiratory: Negative for apnea, cough, chest tightness, shortness of breath and wheezing  Cardiovascular: Negative for chest pain and palpitations  Gastrointestinal:        As noted in HPI   Endocrine: Negative for cold intolerance, heat intolerance and polyuria     Genitourinary: Negative for difficulty urinating, dysuria, flank pain, hematuria and urgency  Musculoskeletal: Negative for arthralgias, back pain, gait problem, joint swelling and myalgias  Skin: Negative for rash and wound  Neurological: Negative for dizziness, tremors, seizures, speech difficulty, light-headedness, numbness and headaches  Hematological: Negative for adenopathy  Does not bruise/bleed easily  Psychiatric/Behavioral: Negative for agitation, behavioral problems and confusion  The patient is not nervous/anxious  Past Medical History:   Diagnosis Date    Benign neoplasm of colon       Past Surgical History:   Procedure Laterality Date    INGUINAL HERNIA REPAIR      TONSILLECTOMY       Social History     Social History    Marital status: /Civil Union     Spouse name: N/A    Number of children: N/A    Years of education: N/A     Occupational History    Not on file  Social History Main Topics    Smoking status: Never Smoker    Smokeless tobacco: Never Used    Alcohol use Yes      Comment: occasional     Drug use: No    Sexual activity: Not on file     Other Topics Concern    Not on file     Social History Narrative    Dog      Family History   Problem Relation Age of Onset    Hypertension Family     Parkinsonism Father      Patient has no known allergies  Current Outpatient Prescriptions   Medication Sig Dispense Refill    rosuvastatin (CRESTOR) 5 mg tablet Take 1 tablet (5 mg total) by mouth daily 30 tablet 3    Na Sulfate-K Sulfate-Mg Sulf (SUPREP BOWEL PREP KIT) 17 5-3 13-1 6 GM/177ML SOLN Take 2 Bottles by mouth see administration instructions Please follow the instructions from the office 2 Bottle 0     No current facility-administered medications for this visit  Blood pressure 122/78, pulse 75, temperature 98 5 °F (36 9 °C), temperature source Tympanic, height 5' 5" (1 651 m), weight 82 kg (180 lb 12 8 oz)      PHYSICAL EXAM: Physical Exam   Constitutional: He is oriented to person, place, and time  He appears well-developed  HENT:   Head: Normocephalic and atraumatic  Mouth/Throat: Oropharynx is clear and moist    Eyes: Pupils are equal, round, and reactive to light  Conjunctivae are normal  Right eye exhibits no discharge  Left eye exhibits no discharge  No scleral icterus  Neck: Neck supple  No JVD present  No tracheal deviation present  No thyromegaly present  Cardiovascular: Normal rate, regular rhythm, normal heart sounds and intact distal pulses  Exam reveals no gallop and no friction rub  No murmur heard  Pulmonary/Chest: Effort normal and breath sounds normal  No respiratory distress  He has no wheezes  He has no rales  He exhibits no tenderness  Abdominal: Soft  Bowel sounds are normal  He exhibits no distension and no mass  There is no tenderness  There is no rebound and no guarding  No hernia  Musculoskeletal: He exhibits no edema  Lymphadenopathy:     He has no cervical adenopathy  Neurological: He is alert and oriented to person, place, and time  Skin: Skin is warm and dry  No rash noted  No erythema  Psychiatric: He has a normal mood and affect  His behavior is normal  Thought content normal         No results found for: WBC, HGB, HCT, MCV, PLT  Lab Results   Component Value Date    CALCIUM 8 7 08/17/2018    K 4 0 08/17/2018    CO2 24 08/17/2018     08/17/2018    BUN 17 08/17/2018    CREATININE 1 24 08/17/2018     Lab Results   Component Value Date    ALT 60 08/17/2018    AST 31 08/17/2018    ALKPHOS 58 08/17/2018     No results found for: INR, PROTIME    Us Thyroid    Result Date: 8/21/2017  Impression:  Normal study  Workstation performed: [id number]       ASSESSMENT & PLAN:    History of colon polyps  Personal history of colon polyps- patient is at increased risk for colon cancer screening  Rule out colorectal lesions including polyps or malignancy     -Schedule for colonoscopy      -High-fiber diet     -Patient was given instructions about the colonoscopy prep     -Patient was explained about  the risks and benefits of the procedure  Risks including but not limited to bleeding, infection, perforation were explained in detail  Also explained about less than 100% sensitivity with the exam and other alternatives  GERD (gastroesophageal reflux disease)  Gastroesophageal reflux disease - Patient has the symptoms of chronic acid reflux for a long time  Possible hiatal hernia or LES weakness  He had negative upper endoscopy before     -Patient was explained about the lifestyle and dietary modifications  Advised to avoid fatty foods, chocolates, caffeine, alcohol and any other triggering foods    Avoid eating for at least 3 hours before going to bed         -may take Pepcid or Zantac as needed

## 2018-11-21 NOTE — ASSESSMENT & PLAN NOTE
Gastroesophageal reflux disease - Patient has the symptoms of chronic acid reflux for a long time  Possible hiatal hernia or LES weakness  He had negative upper endoscopy before     -Patient was explained about the lifestyle and dietary modifications  Advised to avoid fatty foods, chocolates, caffeine, alcohol and any other triggering foods    Avoid eating for at least 3 hours before going to bed         -may take Pepcid or Zantac as needed

## 2018-11-21 NOTE — PROGRESS NOTES
Assessment/Plan:           Problem List Items Addressed This Visit     Hyperlipidemia - Primary     Hyperlipidemia controlled continue with current medical regiment recommend a low-cholesterol diet and recommend routine exercise we will continue to monitor the progress  Relevant Medications    rosuvastatin (CRESTOR) 5 mg tablet    Other Relevant Orders    Lipid Panel with Direct LDL reflex    Prediabetes     Pre diabetes -I have counseled the patient to follow a healthy balanced diet, I have counseled patient reduce carbohydrates and sweets in the diet, I would like the patient exercise routinely  I will be checking hemoglobin A1c and comprehensive metabolic panel  Have counseled patient about the prevention of diabetes, and the risk of progression to type 2 diabetes  Relevant Orders    Ambulatory referral to Diabetic Education    Comprehensive metabolic panel    GERD (gastroesophageal reflux disease)     GERD controlled continue with omeprazole 20 mg once daily, ulcer free diet               Return to office  6 months  call if any problems  Subjective:      Patient ID: José Luis Strickland is a 59 y o  male  HPI [age]-year old male coming in for a follow up visit regarding GERD, hyperlipidemia, overweight prediabetes; The patient reports me compliant taking medications without untoward side effects the  The patient is here to review his medical condition, update me on the medical condition and the patient reports me no hospitalizations and no ER visits  He does report me trying to follow healthy imbalance diet  S reflux currently stable on omeprazole  He is here to review laboratories    Aishwarya 10 going to [facility]  The following portions of the patient's history were reviewed and updated as appropriate: allergies, current medications, past family history, past social history, past surgical history and problem list     Review of Systems   Constitutional: Negative for activity change, appetite Jason change and unexpected weight change  HENT: Negative for dental problem and postnasal drip  Eyes: Negative for visual disturbance  Respiratory: Negative for cough and shortness of breath  Cardiovascular: Negative for chest pain  Gastrointestinal: Negative for abdominal pain, diarrhea, nausea and vomiting  Neurological: Negative for dizziness, light-headedness and headaches  Hematological: Negative for adenopathy  Objective:      /80   Pulse 72   Wt 84 2 kg (185 lb 9 6 oz)   SpO2 94%   BMI 28 22 kg/m²                       No Follow-up on file  No Known Allergies    Past Medical History:   Diagnosis Date    Benign neoplasm of colon      Past Surgical History:   Procedure Laterality Date    INGUINAL HERNIA REPAIR      TONSILLECTOMY       Current Outpatient Prescriptions on File Prior to Visit   Medication Sig Dispense Refill    omeprazole (PriLOSEC) 20 mg delayed release capsule Take 1 capsule by mouth daily as needed       No current facility-administered medications on file prior to visit  Family History   Problem Relation Age of Onset    Hypertension Family      Social History     Social History    Marital status: /Civil Union     Spouse name: N/A    Number of children: N/A    Years of education: N/A     Occupational History    Not on file       Social History Main Topics    Smoking status: Never Smoker    Smokeless tobacco: Never Used    Alcohol use Yes      Comment: occasional     Drug use: Unknown    Sexual activity: Not on file     Other Topics Concern    Not on file     Social History Narrative    Dog      Vitals:    05/10/18 1626   BP: 140/80   Pulse: 72   SpO2: 94%   Weight: 84 2 kg (185 lb 9 6 oz)     Results for orders placed or performed in visit on 04/27/18   Comprehensive metabolic panel   Result Value Ref Range    Sodium 141 136 - 145 mmol/L    Potassium 4 0 3 5 - 5 3 mmol/L    Chloride 106 100 - 108 mmol/L    CO2 23 21 - 32 mmol/L Anion Gap 12 4 - 13 mmol/L    BUN 16 5 - 25 mg/dL    Creatinine 1 22 0 60 - 1 30 mg/dL    Glucose, Fasting 116 (H) 65 - 99 mg/dL    Calcium 9 3 8 3 - 10 1 mg/dL    AST 28 5 - 45 U/L    ALT 59 12 - 78 U/L    Alkaline Phosphatase 68 46 - 116 U/L    Total Protein 8 0 6 4 - 8 2 g/dL    Albumin 4 1 3 5 - 5 0 g/dL    Total Bilirubin 0 50 0 20 - 1 00 mg/dL    eGFR 63 ml/min/1 73sq m   Lipid panel   Result Value Ref Range    Cholesterol 244 (H) 50 - 200 mg/dL    Triglycerides 162 (H) <=150 mg/dL    HDL, Direct 33 (L) 40 - 60 mg/dL    LDL Calculated 179 (H) 0 - 100 mg/dL    Non-HDL-Chol (CHOL-HDL) 211 mg/dl     Weight (last 2 days)     None        Body mass index is 28 22 kg/m²  BP      Temp      Pulse     Resp      SpO2        Vitals:    05/10/18 1626   Weight: 84 2 kg (185 lb 9 6 oz)     Vitals:    05/10/18 1626   Weight: 84 2 kg (185 lb 9 6 oz)      Physical Exam   Constitutional: He appears well-developed and well-nourished  No distress  HENT:   Head: Normocephalic and atraumatic  Right Ear: External ear normal    Left Ear: External ear normal    Mouth/Throat: Oropharynx is clear and moist    Eyes: Conjunctivae are normal  Pupils are equal, round, and reactive to light  Right eye exhibits no discharge  Left eye exhibits no discharge  No scleral icterus  Neck: Neck supple  Cardiovascular: Normal rate, regular rhythm and normal heart sounds  Exam reveals no gallop and no friction rub  No murmur heard  Pulmonary/Chest: No respiratory distress  He has no wheezes  He has no rales  Abdominal: Soft  Bowel sounds are normal  He exhibits no distension and no mass  There is no tenderness  There is no rebound and no guarding  Musculoskeletal: He exhibits no edema or deformity  Lymphadenopathy:     He has no cervical adenopathy  Neurological: He is alert  Skin: He is not diaphoretic  Psychiatric: He has a normal mood and affect

## 2018-11-21 NOTE — PATIENT INSTRUCTIONS
Patient scheduled for a colon at [facility] on 12/18/18 with Dr Sae Joshi instructions given to patient

## 2018-12-05 ENCOUNTER — ANESTHESIA EVENT (OUTPATIENT)
Dept: PERIOP | Facility: AMBULARY SURGERY CENTER | Age: 63
End: 2018-12-05
Payer: COMMERCIAL

## 2018-12-18 ENCOUNTER — ANESTHESIA (OUTPATIENT)
Dept: PERIOP | Facility: AMBULARY SURGERY CENTER | Age: 63
End: 2018-12-18
Payer: COMMERCIAL

## 2018-12-18 ENCOUNTER — HOSPITAL ENCOUNTER (OUTPATIENT)
Facility: AMBULARY SURGERY CENTER | Age: 63
Setting detail: OUTPATIENT SURGERY
Discharge: HOME | End: 2018-12-18
Attending: INTERNAL MEDICINE | Admitting: INTERNAL MEDICINE
Payer: COMMERCIAL

## 2018-12-18 VITALS
BODY MASS INDEX: 25.76 KG/M2 | TEMPERATURE: 97.2 F | WEIGHT: 170 LBS | HEART RATE: 71 BPM | HEIGHT: 68 IN | RESPIRATION RATE: 16 BRPM | DIASTOLIC BLOOD PRESSURE: 85 MMHG | SYSTOLIC BLOOD PRESSURE: 145 MMHG | OXYGEN SATURATION: 98 %

## 2018-12-18 DIAGNOSIS — Z12.11 SCREENING FOR MALIGNANT NEOPLASM OF COLON: ICD-10-CM

## 2018-12-18 DIAGNOSIS — Z86.010 HISTORY OF COLON POLYPS: ICD-10-CM

## 2018-12-18 PROCEDURE — 45385 COLONOSCOPY W/LESION REMOVAL: CPT | Performed by: INTERNAL MEDICINE

## 2018-12-18 PROCEDURE — 88305 TISSUE EXAM BY PATHOLOGIST: CPT | Performed by: PATHOLOGY

## 2018-12-18 RX ORDER — PROPOFOL 10 MG/ML
INJECTION, EMULSION INTRAVENOUS AS NEEDED
Status: DISCONTINUED | OUTPATIENT
Start: 2018-12-18 | End: 2018-12-18 | Stop reason: SURG

## 2018-12-18 RX ORDER — SODIUM CHLORIDE 9 MG/ML
50 INJECTION, SOLUTION INTRAVENOUS CONTINUOUS
Status: DISCONTINUED | OUTPATIENT
Start: 2018-12-18 | End: 2018-12-18 | Stop reason: HOSPADM

## 2018-12-18 RX ADMIN — SODIUM CHLORIDE: 0.9 INJECTION, SOLUTION INTRAVENOUS at 12:53

## 2018-12-18 RX ADMIN — PROPOFOL 20 MG: 10 INJECTION, EMULSION INTRAVENOUS at 13:15

## 2018-12-18 RX ADMIN — SODIUM CHLORIDE 50 ML/HR: 0.9 INJECTION, SOLUTION INTRAVENOUS at 12:56

## 2018-12-18 RX ADMIN — PROPOFOL 150 MG: 10 INJECTION, EMULSION INTRAVENOUS at 13:08

## 2018-12-18 RX ADMIN — PROPOFOL 50 MG: 10 INJECTION, EMULSION INTRAVENOUS at 13:11

## 2018-12-18 NOTE — ANESTHESIA POSTPROCEDURE EVALUATION
Post-Op Assessment Note      CV Status:  Stable    Mental Status:  Somnolent    Hydration Status:  Stable    PONV Controlled:  None    Airway Patency:  Patent        Staff: CRNA           BP      Temp      Pulse     Resp      SpO2

## 2018-12-18 NOTE — H&P (VIEW-ONLY)
Consultation - [facility]  Raf Peñaloza 1955 male         Chief Complaint:  History of colon polyps    HPI:  62-year-old male with history of hyperlipidemia had large mid ascending colon polyp removed back in 2014  He had repeat colonoscopy at that time for complete polypectomy  He also had upper endoscopy at that time  He had another colonoscopy in 2015 and had couple of polyps removed  Patient has regular bowel movements and denies any blood or mucus in the stool  Appetite is good and denies any recent weight loss  Denies any abdominal pain, nausea, or vomiting  Has occasional heartburn and he takes Tums as needed  Denies any difficulty swallowing  REVIEW OF SYSTEMS: Review of Systems   Constitutional: Negative for activity change, appetite change, chills, diaphoresis, fatigue, fever and unexpected weight change  HENT: Negative for ear discharge, ear pain, facial swelling, hearing loss, nosebleeds, sore throat, tinnitus and voice change  Eyes: Negative for pain, discharge, redness, itching and visual disturbance  Respiratory: Negative for apnea, cough, chest tightness, shortness of breath and wheezing  Cardiovascular: Negative for chest pain and palpitations  Gastrointestinal:        As noted in HPI   Endocrine: Negative for cold intolerance, heat intolerance and polyuria  Genitourinary: Negative for difficulty urinating, dysuria, flank pain, hematuria and urgency  Musculoskeletal: Negative for arthralgias, back pain, gait problem, joint swelling and myalgias  Skin: Negative for rash and wound  Neurological: Negative for dizziness, tremors, seizures, speech difficulty, light-headedness, numbness and headaches  Hematological: Negative for adenopathy  Does not bruise/bleed easily  Psychiatric/Behavioral: Negative for agitation, behavioral problems and confusion  The patient is not nervous/anxious           Past Medical History:   Diagnosis Date  Benign neoplasm of colon       Past Surgical History:   Procedure Laterality Date    INGUINAL HERNIA REPAIR      TONSILLECTOMY       Social History     Social History    Marital status: /Civil Union     Spouse name: N/A    Number of children: N/A    Years of education: N/A     Occupational History    Not on file  Social History Main Topics    Smoking status: Never Smoker    Smokeless tobacco: Never Used    Alcohol use Yes      Comment: occasional     Drug use: No    Sexual activity: Not on file     Other Topics Concern    Not on file     Social History Narrative    Dog      Family History   Problem Relation Age of Onset    Hypertension Family     Parkinsonism Father      Patient has no known allergies  Current Outpatient Prescriptions   Medication Sig Dispense Refill    rosuvastatin (CRESTOR) 5 mg tablet Take 1 tablet (5 mg total) by mouth daily 30 tablet 3    Na Sulfate-K Sulfate-Mg Sulf (SUPREP BOWEL PREP KIT) 17 5-3 13-1 6 GM/177ML SOLN Take 2 Bottles by mouth see administration instructions Please follow the instructions from the office 2 Bottle 0     No current facility-administered medications for this visit  Blood pressure 122/78, pulse 75, temperature 98 5 °F (36 9 °C), temperature source Tympanic, height 5' 5" (1 651 m), weight 82 kg (180 lb 12 8 oz)  PHYSICAL EXAM: Physical Exam   Constitutional: He is oriented to person, place, and time  He appears well-developed  HENT:   Head: Normocephalic and atraumatic  Mouth/Throat: Oropharynx is clear and moist    Eyes: Pupils are equal, round, and reactive to light  Conjunctivae are normal  Right eye exhibits no discharge  Left eye exhibits no discharge  No scleral icterus  Neck: Neck supple  No JVD present  No tracheal deviation present  No thyromegaly present  Cardiovascular: Normal rate, regular rhythm, normal heart sounds and intact distal pulses  Exam reveals no gallop and no friction rub      No murmur heard   Pulmonary/Chest: Effort normal and breath sounds normal  No respiratory distress  He has no wheezes  He has no rales  He exhibits no tenderness  Abdominal: Soft  Bowel sounds are normal  He exhibits no distension and no mass  There is no tenderness  There is no rebound and no guarding  No hernia  Musculoskeletal: He exhibits no edema  Lymphadenopathy:     He has no cervical adenopathy  Neurological: He is alert and oriented to person, place, and time  Skin: Skin is warm and dry  No rash noted  No erythema  Psychiatric: He has a normal mood and affect  His behavior is normal  Thought content normal         No results found for: WBC, HGB, HCT, MCV, PLT  Lab Results   Component Value Date    CALCIUM 8 7 08/17/2018    K 4 0 08/17/2018    CO2 24 08/17/2018     08/17/2018    BUN 17 08/17/2018    CREATININE 1 24 08/17/2018     Lab Results   Component Value Date    ALT 60 08/17/2018    AST 31 08/17/2018    ALKPHOS 58 08/17/2018     No results found for: INR, PROTIME    Us Thyroid    Result Date: 8/21/2017  Impression:  Normal study  Workstation performed: [id number]       ASSESSMENT & PLAN:    History of colon polyps  Personal history of colon polyps- patient is at increased risk for colon cancer screening  Rule out colorectal lesions including polyps or malignancy     -Schedule for colonoscopy  -High-fiber diet     -Patient was given instructions about the colonoscopy prep     -Patient was explained about  the risks and benefits of the procedure  Risks including but not limited to bleeding, infection, perforation were explained in detail  Also explained about less than 100% sensitivity with the exam and other alternatives  GERD (gastroesophageal reflux disease)  Gastroesophageal reflux disease - Patient has the symptoms of chronic acid reflux for a long time  Possible hiatal hernia or LES weakness     He had negative upper endoscopy before     -Patient was explained about the lifestyle and dietary modifications  Advised to avoid fatty foods, chocolates, caffeine, alcohol and any other triggering foods    Avoid eating for at least 3 hours before going to bed         -may take Pepcid or Zantac as needed

## 2018-12-18 NOTE — ANESTHESIA PREPROCEDURE EVALUATION
Review of Systems/Medical History    Chart reviewed  No history of anesthetic complications     Cardiovascular  Exercise tolerance (METS): >4,  Hyperlipidemia,    Pulmonary  Negative pulmonary ROS        GI/Hepatic    GERD well controlled,        Negative  ROS        Endo/Other  Negative endo/other ROS   Comment: [id number]       Hematology   Musculoskeletal       Neurology   Psychology           Physical Exam    Airway    Mallampati score: I  TM Distance: >3 FB  Neck ROM: full     Dental   No notable dental hx     Cardiovascular      Pulmonary      Other Findings        Anesthesia Plan  ASA Score- 2     Anesthesia Type- IV sedation with anesthesia with ASA Monitors  Additional Monitors:   Airway Plan:     Comment: GA backup  Plan Factors-    Induction- intravenous  Postoperative Plan-     Informed Consent- Anesthetic plan and risks discussed with patient  I personally reviewed this patient with the CRNA  Discussed and agreed on the Anesthesia Plan with the CRNA  Odessa Leiva

## 2018-12-18 NOTE — OP NOTE
COLONOSCOPY    PROCEDURE: Colonoscopy/ Polypectomy (Cold Snare)    INDICATIONS: Screening for Colon Cancer, History of Colon Polyps    POST-OP DIAGNOSIS: See the impression below    SEDATION: Monitored anesthesia care, check anesthesia records    PHYSICAL EXAM:    /95   Pulse 75   Temp 97 7 °F (36 5 °C) (Temporal)   Resp 18   Ht 5' 8" (1 727 m)   Wt 77 1 kg (170 lb)   SpO2 96%   BMI 25 85 kg/m²   Body mass index is 25 85 kg/m²  General: NAD  Heart: S1 & S2 normal, RRR  Lungs: CTA, No rales or rhonchi  Abdomen: Soft, nontender, nondistended, good bowel sounds    CONSENT:  Informed consent was obtained for the procedure, including sedation after explaining the risks and benefits of the procedure  Risks including but not limited to bleeding, perforation, infection, aspiration were discussed in detail  Also explained about less than 100%$ sensitivity with the exam and other alternatives  PREPARATION:   EKG tracing, pulse oximetry, blood pressure were monitored throughout the procedure  Patient was identified by myself both verbally and by visual inspection of ID band  DESCRIPTION:   Patient was placed in the left lateral decubitus position and was sedated with the above medication  Digital rectal examination was performed  The colonoscope was introduced in to the anal canal and advanced up to cecum, which was identified by the appendiceal orifice and IC valve  A careful inspection was made as the colonoscope was withdrawn, including a retroflexed view of the rectum; findings and interventions are described below  Appropriate photodocumentation was obtained  The quality of the colonic preparation was adequate  FINDINGS:    1  Cecum and ileocecal valve-normal mucosa    2  Ascending colon-4 mm polyp was removed with cold snare  Also noted ink marker in the mid sigmoid area      3  The rest of the colon mucosa is normal         IMPRESSIONS:      As above    RECOMMENDATIONS:    Check pathology    COMPLICATIONS:  None; patient tolerated the procedure well      DISPOSITION: PACU           CONDITION: Stable

## 2018-12-20 ENCOUNTER — TELEPHONE (OUTPATIENT)
Dept: GASTROENTEROLOGY | Facility: AMBULARY SURGERY CENTER | Age: 63
End: 2018-12-20

## 2018-12-20 NOTE — TELEPHONE ENCOUNTER
----- Message from Adelfo Garcia MD sent at 12/19/2018  7:10 PM EST -----    Colon polyps removed came back as precancerous tubular adenoma  Next colonoscopy in 3 years  Patient to call our office for any GI symptoms

## 2019-03-12 ENCOUNTER — TRANSCRIBE ORDERS (OUTPATIENT)
Dept: ADMINISTRATIVE | Facility: HOSPITAL | Age: 64
End: 2019-03-12

## 2019-03-12 ENCOUNTER — APPOINTMENT (OUTPATIENT)
Dept: LAB | Facility: HOSPITAL | Age: 64
End: 2019-03-12
Payer: COMMERCIAL

## 2019-03-12 DIAGNOSIS — E66.3 OVERWEIGHT (BMI 25.0-29.9): ICD-10-CM

## 2019-03-12 DIAGNOSIS — E78.5 HYPERLIPIDEMIA, UNSPECIFIED HYPERLIPIDEMIA TYPE: ICD-10-CM

## 2019-03-12 DIAGNOSIS — R73.03 PREDIABETES: ICD-10-CM

## 2019-03-12 LAB
ALBUMIN SERPL BCP-MCNC: 4 G/DL (ref 3.5–5)
ALP SERPL-CCNC: 64 U/L (ref 46–116)
ALT SERPL W P-5'-P-CCNC: 57 U/L (ref 12–78)
ANION GAP SERPL CALCULATED.3IONS-SCNC: 10 MMOL/L (ref 4–13)
AST SERPL W P-5'-P-CCNC: 27 U/L (ref 5–45)
BILIRUB SERPL-MCNC: 0.4 MG/DL (ref 0.2–1)
BUN SERPL-MCNC: 20 MG/DL (ref 5–25)
CALCIUM SERPL-MCNC: 9.2 MG/DL (ref 8.3–10.1)
CHLORIDE SERPL-SCNC: 104 MMOL/L (ref 100–108)
CHOLEST SERPL-MCNC: 183 MG/DL (ref 50–200)
CO2 SERPL-SCNC: 26 MMOL/L (ref 21–32)
CREAT SERPL-MCNC: 1.2 MG/DL (ref 0.6–1.3)
EST. AVERAGE GLUCOSE BLD GHB EST-MCNC: 131 MG/DL
GFR SERPL CREATININE-BSD FRML MDRD: 64 ML/MIN/1.73SQ M
GLUCOSE P FAST SERPL-MCNC: 114 MG/DL (ref 65–99)
HBA1C MFR BLD: 6.2 % (ref 4.2–6.3)
HDLC SERPL-MCNC: 35 MG/DL (ref 40–60)
LDLC SERPL CALC-MCNC: 104 MG/DL (ref 0–100)
POTASSIUM SERPL-SCNC: 4.1 MMOL/L (ref 3.5–5.3)
PROT SERPL-MCNC: 7.7 G/DL (ref 6.4–8.2)
SODIUM SERPL-SCNC: 140 MMOL/L (ref 136–145)
TRIGL SERPL-MCNC: 222 MG/DL

## 2019-03-12 PROCEDURE — 80053 COMPREHEN METABOLIC PANEL: CPT

## 2019-03-12 PROCEDURE — 83036 HEMOGLOBIN GLYCOSYLATED A1C: CPT

## 2019-03-12 PROCEDURE — 36415 COLL VENOUS BLD VENIPUNCTURE: CPT

## 2019-03-12 PROCEDURE — 80061 LIPID PANEL: CPT

## 2019-03-20 ENCOUNTER — OFFICE VISIT (OUTPATIENT)
Dept: INTERNAL MEDICINE CLINIC | Facility: CLINIC | Age: 64
End: 2019-03-20
Payer: COMMERCIAL

## 2019-03-20 VITALS
WEIGHT: 184.8 LBS | OXYGEN SATURATION: 96 % | HEIGHT: 68 IN | BODY MASS INDEX: 28.01 KG/M2 | DIASTOLIC BLOOD PRESSURE: 88 MMHG | SYSTOLIC BLOOD PRESSURE: 138 MMHG | RESPIRATION RATE: 16 BRPM | HEART RATE: 72 BPM

## 2019-03-20 DIAGNOSIS — E78.5 HYPERLIPIDEMIA, UNSPECIFIED HYPERLIPIDEMIA TYPE: Primary | ICD-10-CM

## 2019-03-20 DIAGNOSIS — Z12.5 SCREENING PSA (PROSTATE SPECIFIC ANTIGEN): ICD-10-CM

## 2019-03-20 DIAGNOSIS — Z23 NEED FOR INFLUENZA VACCINATION: ICD-10-CM

## 2019-03-20 DIAGNOSIS — I83.93 VARICOSE VEINS OF BOTH LOWER EXTREMITIES, UNSPECIFIED WHETHER COMPLICATED: ICD-10-CM

## 2019-03-20 DIAGNOSIS — M25.512 CHRONIC LEFT SHOULDER PAIN: ICD-10-CM

## 2019-03-20 DIAGNOSIS — Z11.59 ENCOUNTER FOR HEPATITIS C SCREENING TEST FOR LOW RISK PATIENT: ICD-10-CM

## 2019-03-20 DIAGNOSIS — R73.03 PREDIABETES: ICD-10-CM

## 2019-03-20 DIAGNOSIS — G89.29 CHRONIC LEFT SHOULDER PAIN: ICD-10-CM

## 2019-03-20 DIAGNOSIS — E66.3 OVERWEIGHT (BMI 25.0-29.9): ICD-10-CM

## 2019-03-20 DIAGNOSIS — K21.9 GASTROESOPHAGEAL REFLUX DISEASE WITHOUT ESOPHAGITIS: ICD-10-CM

## 2019-03-20 PROCEDURE — 99214 OFFICE O/P EST MOD 30 MIN: CPT | Performed by: INTERNAL MEDICINE

## 2019-03-20 PROCEDURE — 3008F BODY MASS INDEX DOCD: CPT | Performed by: INTERNAL MEDICINE

## 2019-03-20 PROCEDURE — 1036F TOBACCO NON-USER: CPT | Performed by: INTERNAL MEDICINE

## 2019-03-20 NOTE — PROGRESS NOTES
BMI Counseling: Body mass index is 28 1 kg/m²  Discussed the patient's BMI with him  The BMI is above average  BMI counseling and education was provided to the patient  Nutrition recommendations include reducing portion sizes  Assessment/Plan:    GERD (gastroesophageal reflux disease)  Currently stable doing well continue with current medical regiment will continue monitor  Hyperlipidemia  Hyperlipidemia controlled continue with current medical regiment recommend a low-cholesterol diet and recommend routine exercise we will continue to monitor the progress  Overweight (BMI 25 0-29  9)  I have counselled the pt to follow a healthy and balanced diet ,and recommend routine exercise  Prediabetes  Pre diabetes -I have counseled the patient to follow a healthy balanced diet, I have counseled patient reduce carbohydrates and sweets in the diet, I would like the patient exercise routinely  I will be checking hemoglobin A1c and comprehensive metabolic panel  Have counseled patient about the prevention of diabetes, and the risk of progression to type 2 diabetes  Chronic left shoulder pain  His symptoms are compatible with arthritis will check x-ray of the left shoulder    Varicose veins of both lower extremities  Will check venous Doppler with reflux study         Problem List Items Addressed This Visit        Digestive    GERD (gastroesophageal reflux disease)     Currently stable doing well continue with current medical regiment will continue monitor  Cardiovascular and Mediastinum    Varicose veins of both lower extremities     Will check venous Doppler with reflux study         Relevant Orders    VAS reflux lower limb venous duplex study with reflux assessment, complete bilateral       Other    Hyperlipidemia - Primary     Hyperlipidemia controlled continue with current medical regiment recommend a low-cholesterol diet and recommend routine exercise we will continue to monitor the progress  Relevant Orders    Lipid Panel with Direct LDL reflex    Prediabetes     Pre diabetes -I have counseled the patient to follow a healthy balanced diet, I have counseled patient reduce carbohydrates and sweets in the diet, I would like the patient exercise routinely  I will be checking hemoglobin A1c and comprehensive metabolic panel  Have counseled patient about the prevention of diabetes, and the risk of progression to type 2 diabetes  Relevant Orders    Comprehensive metabolic panel    Hemoglobin A1C    Overweight (BMI 25 0-29  9)     I have counselled the pt to follow a healthy and balanced diet ,and recommend routine exercise  Chronic left shoulder pain     His symptoms are compatible with arthritis will check x-ray of the left shoulder         Relevant Orders    XR shoulder 2+ vw left      Other Visit Diagnoses     Screening PSA (prostate specific antigen)        Relevant Orders    PSA, Total Screen    Encounter for hepatitis C screening test for low risk patient        Relevant Orders    Hepatitis C antibody    Need for influenza vaccination              Return to office 6  months  call if any problems  Subjective:      Patient ID: Xiao Figueroa is a 59 y o  male  HPI 60-year old male coming in for a follow up visit regarding hyperlipidemia, overweight, prediabetes, GERD; The patient reports me compliant taking medications without untoward side effects the  The patient is here to review his medical condition, update me on the medical condition and the patient reports me no hospitalizations and no ER visits  reports me that he has noticed job a dilatation or prominence of his pains of his left lower extremity on the medial aspect of the thigh and also the calf muscle  Over last year and he has seen a proper progression over last all winter  Overall has months he has had left shoulder it has come for worse with sleeping it is most noticeable when upon awakening in the morning    He does have a good mattress for his bed  The following portions of the patient's history were reviewed and updated as appropriate: allergies, current medications, past family history, past medical history, past social history, past surgical history and problem list     Review of Systems   Constitutional: Negative for activity change, appetite change and unexpected weight change  HENT: Negative for congestion and postnasal drip  Eyes: Negative for visual disturbance  Respiratory: Negative for cough and shortness of breath  Cardiovascular: Negative for chest pain  Gastrointestinal: Negative for abdominal pain, diarrhea, nausea and vomiting  Neurological: Negative for dizziness, light-headedness and headaches  Hematological: Negative for adenopathy  mild left shoulder pain is in the morning which improves with movement and use                  Return in about 6 months (around 9/20/2019)  No Known Allergies    Past Medical History:   Diagnosis Date    Benign neoplasm of colon      Past Surgical History:   Procedure Laterality Date    INGUINAL HERNIA REPAIR      MD COLONOSCOPY FLX DX W/COLLJ SPEC WHEN PFRMD N/A 12/18/2018    Procedure: COLONOSCOPY;  Surgeon: Stephanie Martinez MD;  [facility]; Service: Gastroenterology    TONSILLECTOMY       Current Outpatient Medications on File Prior to Visit   Medication Sig Dispense Refill    Na Sulfate-K Sulfate-Mg Sulf (SUPREP BOWEL PREP KIT) 17 5-3 13-1 6 GM/177ML SOLN Take 2 Bottles by mouth see administration instructions Please follow the instructions from the office 2 Bottle 0    rosuvastatin (CRESTOR) 5 mg tablet Take 1 tablet (5 mg total) by mouth daily 30 tablet 3     No current facility-administered medications on file prior to visit        Family History   Problem Relation Age of Onset    Hypertension Family     Parkinsonism Father      Social History     Socioeconomic History    Marital status: /Civil Union     Spouse name: Not on file    Number of children: Not on file    Years of education: Not on file    Highest education level: Not on file   Occupational History    Not on file   Social Needs    Financial resource strain: Not on file    Food insecurity:     Worry: Not on file     Inability: Not on file    Transportation needs:     Medical: Not on file     Non-medical: Not on file   Tobacco Use    Smoking status: Never Smoker    Smokeless tobacco: Never Used   Substance and Sexual Activity    Alcohol use: Yes     Comment: occasional     Drug use: No    Sexual activity: Not on file   Lifestyle    Physical activity:     Days per week: Not on file     Minutes per session: Not on file    Stress: Not on file   Relationships    Social connections:     Talks on phone: Not on file     Gets together: Not on file     Attends [employer] service: Not on file     Active member of club or organization: Not on file     Attends meetings of clubs or organizations: Not on file     Relationship status: Not on file    Intimate partner violence:     Fear of current or ex partner: Not on file     Emotionally abused: Not on file     Physically abused: Not on file     Forced sexual activity: Not on file   Other Topics Concern    Not on file   Social History Narrative    Dog      Vitals:    03/20/19 1559   BP: 138/88   Pulse: 72   Resp: 16   SpO2: 96%   Weight: 83 8 kg (184 lb 12 8 oz)   Height: 5' 8" (1 727 m)     Results for orders placed or performed in visit on 03/12/19   Comprehensive metabolic panel   Result Value Ref Range    Sodium 140 136 - 145 mmol/L    Potassium 4 1 3 5 - 5 3 mmol/L    Chloride 104 100 - 108 mmol/L    CO2 26 21 - 32 mmol/L    ANION GAP 10 4 - 13 mmol/L    BUN 20 5 - 25 mg/dL    Creatinine 1 20 0 60 - 1 30 mg/dL    Glucose, Fasting 114 (H) 65 - 99 mg/dL    Calcium 9 2 8 3 - 10 1 mg/dL    AST 27 5 - 45 U/L    ALT 57 12 - 78 U/L    Alkaline Phosphatase 64 46 - 116 U/L    Total Protein 7 7 6 4 - 8 2 g/dL    Albumin 4 0 3 5 - 5 0 g/dL    Total Bilirubin 0 40 0 20 - 1 00 mg/dL    eGFR 64 ml/min/1 73sq m   Lipid Panel with Direct LDL reflex   Result Value Ref Range    Cholesterol 183 50 - 200 mg/dL    Triglycerides 222 (H) <=150 mg/dL    HDL, Direct 35 (L) 40 - 60 mg/dL    LDL Calculated 104 (H) 0 - 100 mg/dL   Hemoglobin A1C   Result Value Ref Range    Hemoglobin A1C 6 2 4 2 - 6 3 %     mg/dl     Weight (last 2 days)     Date/Time   Weight    03/20/19 1559   83 8 (184 8)            Body mass index is 28 1 kg/m²  BP      Temp      Pulse     Resp      SpO2        Vitals:    03/20/19 1559   Weight: 83 8 kg (184 lb 12 8 oz)     Vitals:    03/20/19 1559   Weight: 83 8 kg (184 lb 12 8 oz)       Objective:      /88   Pulse 72   Resp 16   Ht 5' 8" (1 727 m)   Wt 83 8 kg (184 lb 12 8 oz)   SpO2 96%   BMI 28 10 kg/m²       Varicose veins left lower extremity knee medical assistant Rosalba Ramey in the room   Physical Exam   Constitutional: He appears well-developed and well-nourished  No distress  HENT:   Head: Normocephalic and atraumatic  Right Ear: External ear normal    Left Ear: External ear normal    Mouth/Throat: Oropharynx is clear and moist    Eyes: Pupils are equal, round, and reactive to light  Conjunctivae are normal  Right eye exhibits no discharge  Left eye exhibits no discharge  No scleral icterus  Neck: Neck supple  Cardiovascular: Normal rate, regular rhythm and normal heart sounds  Exam reveals no gallop and no friction rub  No murmur heard  Pulmonary/Chest: No respiratory distress  He has no wheezes  He has no rales  Abdominal: Soft  Bowel sounds are normal  He exhibits no distension and no mass  There is no tenderness  There is no rebound and no guarding  Musculoskeletal: He exhibits no edema or deformity  Lymphadenopathy:     He has no cervical adenopathy  Neurological: He is alert  Skin: He is not diaphoretic  Psychiatric: He has a normal mood and affect       full range of motion of the left shoulder negative impingement, mild lateral shoulder he says tenderness with palpation

## 2019-03-22 ENCOUNTER — HOSPITAL ENCOUNTER (OUTPATIENT)
Dept: RADIOLOGY | Facility: HOSPITAL | Age: 64
Discharge: HOME | End: 2019-03-22
Payer: COMMERCIAL

## 2019-03-22 ENCOUNTER — TRANSCRIBE ORDERS (OUTPATIENT)
Dept: ADMINISTRATIVE | Facility: HOSPITAL | Age: 64
End: 2019-03-22

## 2019-03-22 DIAGNOSIS — G89.29 CHRONIC LEFT SHOULDER PAIN: ICD-10-CM

## 2019-03-22 DIAGNOSIS — M25.512 CHRONIC LEFT SHOULDER PAIN: ICD-10-CM

## 2019-03-22 PROCEDURE — 73030 X-RAY EXAM OF SHOULDER: CPT

## 2019-04-01 ENCOUNTER — HOSPITAL ENCOUNTER (OUTPATIENT)
Dept: RADIOLOGY | Facility: HOSPITAL | Age: 64
Discharge: HOME | End: 2019-04-01
Payer: COMMERCIAL

## 2019-04-01 DIAGNOSIS — I83.93 VARICOSE VEINS OF BOTH LOWER EXTREMITIES, UNSPECIFIED WHETHER COMPLICATED: ICD-10-CM

## 2019-04-01 PROCEDURE — 93970 EXTREMITY STUDY: CPT | Performed by: SURGERY

## 2019-04-01 PROCEDURE — 93970 EXTREMITY STUDY: CPT

## 2019-05-22 DIAGNOSIS — E78.5 HYPERLIPIDEMIA, UNSPECIFIED HYPERLIPIDEMIA TYPE: ICD-10-CM

## 2019-05-22 RX ORDER — ROSUVASTATIN CALCIUM 5 MG/1
TABLET, COATED ORAL
Qty: 30 TABLET | Refills: 0 | Status: SHIPPED | OUTPATIENT
Start: 2019-05-22 | End: 2019-08-09 | Stop reason: SDUPTHER

## 2019-07-12 ENCOUNTER — TELEPHONE (OUTPATIENT)
Dept: INTERNAL MEDICINE CLINIC | Facility: CLINIC | Age: 64
End: 2019-07-12

## 2019-07-12 NOTE — TELEPHONE ENCOUNTER
Patient said he was supposed to have another venous duplex done but I don't see any indication of this in his chart  Patient is aware that you are out of the office until Monday  Please, advise

## 2019-07-15 ENCOUNTER — TELEPHONE (OUTPATIENT)
Dept: INTERNAL MEDICINE CLINIC | Facility: CLINIC | Age: 64
End: 2019-07-15

## 2019-07-15 NOTE — TELEPHONE ENCOUNTER
The patient is requesting a referral for a vascular doctor  He said you had already given him one  I could not find it in his chart   Please advise

## 2019-07-15 NOTE — TELEPHONE ENCOUNTER
The patient states that he is supposed to have a repeat VAS study done  The last one I see was performed 4/1/19 and your result notes advise that he is to see Dr Al Moore  The patient was given the information to set up the appointment  Please advise

## 2019-07-18 DIAGNOSIS — I83.93 VARICOSE VEINS OF BOTH LOWER EXTREMITIES, UNSPECIFIED WHETHER COMPLICATED: Primary | ICD-10-CM

## 2019-08-09 DIAGNOSIS — E78.5 HYPERLIPIDEMIA, UNSPECIFIED HYPERLIPIDEMIA TYPE: ICD-10-CM

## 2019-08-09 RX ORDER — ROSUVASTATIN CALCIUM 5 MG/1
TABLET, COATED ORAL
Qty: 30 TABLET | Refills: 0 | Status: SHIPPED | OUTPATIENT
Start: 2019-08-09 | End: 2019-09-26 | Stop reason: SDUPTHER

## 2019-08-19 ENCOUNTER — CONSULT (OUTPATIENT)
Dept: VASCULAR SURGERY | Facility: CLINIC | Age: 64
End: 2019-08-19
Payer: COMMERCIAL

## 2019-08-19 VITALS
BODY MASS INDEX: 27.74 KG/M2 | WEIGHT: 183 LBS | HEIGHT: 68 IN | HEART RATE: 69 BPM | SYSTOLIC BLOOD PRESSURE: 110 MMHG | TEMPERATURE: 99.1 F | DIASTOLIC BLOOD PRESSURE: 80 MMHG

## 2019-08-19 DIAGNOSIS — I83.93 ASYMPTOMATIC VARICOSE VEINS OF BOTH LOWER EXTREMITIES: ICD-10-CM

## 2019-08-19 DIAGNOSIS — I83.93 VARICOSE VEINS OF BOTH LOWER EXTREMITIES, UNSPECIFIED WHETHER COMPLICATED: ICD-10-CM

## 2019-08-19 PROCEDURE — 99243 OFF/OP CNSLTJ NEW/EST LOW 30: CPT | Performed by: NURSE PRACTITIONER

## 2019-08-19 NOTE — ASSESSMENT & PLAN NOTE
63 y/o healthy appearing male with HLD, referred for evaluation of varicose veins  LEVDR with BLE superficial incompetence and RLE deep incompetence  Fortunately, at this time he is asymptomatic with no symptoms of venous insufficiency  Denies leg swelling, pain, heaviness or fatigue  He works long hours standing, so have advised the use of compression stockings to prevent worsening disease/development of symptoms  Should he develop any pain or swelling to the legs he will notify the office, otherwise we will be happy to see him on an as needed basis

## 2019-08-19 NOTE — PROGRESS NOTES
Assessment/Plan:    Asymptomatic varicose veins of both lower extremities  63 y/o healthy appearing male with HLD, referred for evaluation of varicose veins  LEVDR with BLE superficial incompetence and RLE deep incompetence  Fortunately, at this time he is asymptomatic with no symptoms of venous insufficiency  Denies leg swelling, pain, heaviness or fatigue  He works long hours standing, so have advised the use of compression stockings to prevent worsening disease/development of symptoms  Should he develop any pain or swelling to the legs he will notify the office, otherwise we will be happy to see him on an as needed basis  Diagnoses and all orders for this visit:    Varicose veins of both lower extremities, unspecified whether complicated  -     Ambulatory referral to Vascular Surgery    Asymptomatic varicose veins of both lower extremities  -     Compression Stocking          Subjective:      Patient ID: Charles Green is a 62 y o  male  Pt is new to the practice  Pt is c/o bulging VV in the bilat legs L>R  He denies pain  FH of VV  Pt had LEVDR 4/1  No prior treatment  Patient referred by PCP for evaluation of bilateral lower extremity varicose veins  He had LEVDR done 4/1/19 w/ note of B venous insufficiency  He has large truncal varicosities to BLE, L>R  He is concerned and states that the veins in his left leg have started to bulge more over the past 2 years  Fortunately, at this time is is asymptomatic  He denies any lower extremity swelling, pain, heaviness, or fatigue  No skin changes or tissue loss  He works at [employer] in [employer], standing long hours, at end of day he states his legs feel fine with no pain or swelling  Family hx of mother with varicose veins        The following portions of the patient's history were reviewed and updated as appropriate: allergies, current medications, past family history, past medical history, past social history, past surgical history and problem list     Review of Systems   Constitutional: Negative  HENT: Negative  Eyes: Negative  Respiratory: Negative  Cardiovascular: Positive for leg swelling  Gastrointestinal: Negative  Endocrine: Negative  Genitourinary: Negative  Skin: Negative  Allergic/Immunologic: Negative  Neurological: Negative  Hematological: Negative  Psychiatric/Behavioral: Negative  Objective:     Physical Exam   Constitutional: He is oriented to person, place, and time  He appears well-nourished  No distress  HENT:   Head: Normocephalic and atraumatic  Eyes: No scleral icterus  Neck: Neck supple  No JVD present  Cardiovascular: Normal rate and regular rhythm  Pulses:       Dorsalis pedis pulses are 2+ on the right side, and 2+ on the left side  Truncal varicosities bilateral lower extremities, L>R  Reticular varicosities right thigh   Pulmonary/Chest: Effort normal and breath sounds normal    Abdominal: Soft  He exhibits no distension  Musculoskeletal: He exhibits no edema  Neurological: He is alert and oriented to person, place, and time  Skin: Skin is warm  Psychiatric: He has a normal mood and affect  I have reviewed and made appropriate changes to the review of systems input by the medical assistant  Vitals:    08/19/19 0941   BP: 110/80   BP Location: Left arm   Patient Position: Sitting   Cuff Size: Adult   Pulse: 69   Temp: 99 1 °F (37 3 °C)   TempSrc: Tympanic   Weight: 83 kg (183 lb)   Height: 5' 8" (1 727 m)       Patient Active Problem List   Diagnosis    Hyperlipidemia    Prediabetes    Overweight (BMI 25 0-29  9)    GERD (gastroesophageal reflux disease)    History of colon polyps    Screening for malignant neoplasm of colon    Asymptomatic varicose veins of both lower extremities    Chronic left shoulder pain       Past Surgical History:   Procedure Laterality Date    INGUINAL HERNIA REPAIR      OR COLONOSCOPY FLX DX W/COLLJ [facility] WHEN PFRMD N/A 12/18/2018    Procedure: COLONOSCOPY;  Surgeon: Saran Garibay MD;  [facility];   Service: Gastroenterology    TONSILLECTOMY         Family History   Problem Relation Age of Onset    Hypertension Family     Varicose Veins Mother     Parkinsonism Father        Social History     Socioeconomic History    Marital status: /Civil Union     Spouse name: Not on file    Number of children: Not on file    Years of education: Not on file    Highest education level: Not on file   Occupational History    Not on file   Social Needs    Financial resource strain: Not on file    Food insecurity:     Worry: Not on file     Inability: Not on file    Transportation needs:     Medical: Not on file     Non-medical: Not on file   Tobacco Use    Smoking status: Never Smoker    Smokeless tobacco: Never Used   Substance and Sexual Activity    Alcohol use: Yes     Comment: occasional     Drug use: No    Sexual activity: Not on file   Lifestyle    Physical activity:     Days per week: Not on file     Minutes per session: Not on file    Stress: Not on file   Relationships    Social connections:     Talks on phone: Not on file     Gets together: Not on file     Attends Taoism service: Not on file     Active member of club or organization: Not on file     Attends meetings of clubs or organizations: Not on file     Relationship status: Not on file    Intimate partner violence:     Fear of current or ex partner: Not on file     Emotionally abused: Not on file     Physically abused: Not on file     Forced sexual activity: Not on file   Other Topics Concern    Not on file   Social History Narrative    Dog        No Known Allergies      Current Outpatient Medications:     rosuvastatin (CRESTOR) 5 mg tablet, TAKE ONE TABLET BY MOUTH EVERY DAY, Disp: 30 tablet, Rfl: 0

## 2019-08-19 NOTE — PATIENT INSTRUCTIONS
Asymptomatic varicose veins  - we reviewed the results of your recent venous duplex done April 2019  You have mild to moderate venous insufficiency  - you should wear knee-high compression stockings on a daily basis during waking hours only to prevent progression of varicose vein symptoms   - fortunately at this time you are asymptomatic  If in the future if you were to develop any pain, heaviness, aching, or swelling notify our office  - in addition to stockings other things that can help prevent progression of venous insufficiency include periodic elevation, regular physical activity and maintenance of a healthy weight  - if you develop any symptoms notify our office, otherwise will be happy to see you on an as needed basis    Varicose Veins   WHAT YOU NEED TO KNOW:   Varicose veins are veins that become large, twisted, and swollen  They are common on the back of the calves, knees, and thighs  Varicose veins are caused by valves in your veins that do not work properly  This causes blood to collect and increase pressure in the veins of your legs  The increased pressure causes your veins to stretch, get larger, swell, and twist        DISCHARGE INSTRUCTIONS:   Return to the emergency department if:   · You have a wound that does not heal or is infected  · You have an injury that has broken your skin and caused your varicose veins to bleed  · Your leg is swollen and hard  · You have pain in your leg that does not go away or gets worse  · You notice that your legs or feet are turning blue or black  · Your leg feels warm, tender, and painful  It may look swollen and red  Contact your healthcare provider if:   · Your symptoms get worse or they keep you from doing your daily activities  · You have an injury that has caused your varicose veins to bleed underneath your skin  · You have a rash on your leg  · Your symptoms keep you from doing your daily activities      · You have questions or concerns about your condition or care  Medicines:   · Prescription pain medicine  may be given  Ask how to take this medicine safely  · Take your medicine as directed  Contact your healthcare provider if you think your medicine is not helping or if you have side effects  Tell him or her if you are allergic to any medicine  Keep a list of the medicines, vitamins, and herbs you take  Include the amounts, and when and why you take them  Bring the list or the pill bottles to follow-up visits  Carry your medicine list with you in case of an emergency  Follow up with your healthcare provider as directed:  Write down your questions so you remember to ask them during your visits  Manage varicose veins:   · Wear pressure stockings  The stockings are tight and put pressure on your legs  They improve blood flow and help prevent clots  · Elevate your legs  Keep them above the level of your heart for 15 to 30 minutes several times a day  This will help blood to flow back to your heart  · Do not sit or stand for long periods of time  This can cause the blood to collect in your legs and make your symptoms worse  Walk around for a few minutes every hour to get blood moving in your legs  · Do not wear tight clothing or shoes  Do not wear high-heeled shoes  Do not wear clothes that are tight around the waist     · Get plenty of exercise  Talk to your healthcare provider about the best exercise plan for you  Exercise can decrease your blood pressure and improve your health  Bend or rotate your ankles several times every hour  This will help blood to flow back to the heart  · Maintain a healthy weight  Your heart works harder when you are overweight  This can make varicose vein worse  Ask your healthcare provider how much you should weigh  Ask him to help you create a weight loss plan if you are overweight  · Do not smoke    Nicotine and other chemicals in cigarettes and cigars can cause blood vessel damage  Ask your healthcare provider for information if you currently smoke and need help to quit  E-cigarettes or smokeless tobacco still contain nicotine  Talk to your healthcare provider before you use these products  © 2017 [employer] is for End User's use only and may not be sold, redistributed or otherwise used for commercial purposes  All illustrations and images included in CareNotes® are the copyrighted property of [employer]  or [employer]  The above information is an  only  It is not intended as medical advice for individual conditions or treatments  Talk to your doctor, nurse or pharmacist before following any medical regimen to see if it is safe and effective for you

## 2019-08-22 ENCOUNTER — DOCUMENTATION (OUTPATIENT)
Dept: VASCULAR SURGERY | Facility: CLINIC | Age: 64
End: 2019-08-22

## 2019-09-24 ENCOUNTER — APPOINTMENT (OUTPATIENT)
Dept: LAB | Facility: HOSPITAL | Age: 64
End: 2019-09-24
Payer: COMMERCIAL

## 2019-09-24 ENCOUNTER — TRANSCRIBE ORDERS (OUTPATIENT)
Dept: ADMINISTRATIVE | Facility: HOSPITAL | Age: 64
End: 2019-09-24

## 2019-09-24 DIAGNOSIS — Z11.59 ENCOUNTER FOR HEPATITIS C SCREENING TEST FOR LOW RISK PATIENT: ICD-10-CM

## 2019-09-24 DIAGNOSIS — Z12.5 SCREENING PSA (PROSTATE SPECIFIC ANTIGEN): ICD-10-CM

## 2019-09-24 DIAGNOSIS — E78.5 HYPERLIPIDEMIA, UNSPECIFIED HYPERLIPIDEMIA TYPE: ICD-10-CM

## 2019-09-24 DIAGNOSIS — R73.03 PREDIABETES: ICD-10-CM

## 2019-09-24 LAB
ALBUMIN SERPL BCP-MCNC: 3.9 G/DL (ref 3.5–5)
ALP SERPL-CCNC: 55 U/L (ref 46–116)
ALT SERPL W P-5'-P-CCNC: 41 U/L (ref 12–78)
ANION GAP SERPL CALCULATED.3IONS-SCNC: 9 MMOL/L (ref 4–13)
AST SERPL W P-5'-P-CCNC: 29 U/L (ref 5–45)
BILIRUB SERPL-MCNC: 0.5 MG/DL (ref 0.2–1)
BUN SERPL-MCNC: 14 MG/DL (ref 5–25)
CALCIUM SERPL-MCNC: 8.6 MG/DL (ref 8.3–10.1)
CHLORIDE SERPL-SCNC: 105 MMOL/L (ref 100–108)
CHOLEST SERPL-MCNC: 201 MG/DL (ref 50–200)
CO2 SERPL-SCNC: 26 MMOL/L (ref 21–32)
CREAT SERPL-MCNC: 1.21 MG/DL (ref 0.6–1.3)
EST. AVERAGE GLUCOSE BLD GHB EST-MCNC: 126 MG/DL
GFR SERPL CREATININE-BSD FRML MDRD: 63 ML/MIN/1.73SQ M
GLUCOSE P FAST SERPL-MCNC: 113 MG/DL (ref 65–99)
HBA1C MFR BLD: 6 % (ref 4.2–6.3)
HCV AB SER QL: NORMAL
HDLC SERPL-MCNC: 38 MG/DL (ref 40–60)
LDLC SERPL CALC-MCNC: 127 MG/DL (ref 0–100)
POTASSIUM SERPL-SCNC: 3.9 MMOL/L (ref 3.5–5.3)
PROT SERPL-MCNC: 7.5 G/DL (ref 6.4–8.2)
PSA SERPL-MCNC: 3.9 NG/ML (ref 0–4)
SODIUM SERPL-SCNC: 140 MMOL/L (ref 136–145)
TRIGL SERPL-MCNC: 178 MG/DL

## 2019-09-24 PROCEDURE — 83036 HEMOGLOBIN GLYCOSYLATED A1C: CPT

## 2019-09-24 PROCEDURE — 86803 HEPATITIS C AB TEST: CPT

## 2019-09-24 PROCEDURE — G0103 PSA SCREENING: HCPCS

## 2019-09-24 PROCEDURE — 80053 COMPREHEN METABOLIC PANEL: CPT

## 2019-09-24 PROCEDURE — 80061 LIPID PANEL: CPT

## 2019-09-24 PROCEDURE — 36415 COLL VENOUS BLD VENIPUNCTURE: CPT

## 2019-09-25 DIAGNOSIS — R97.20 ELEVATED PSA: Primary | ICD-10-CM

## 2019-09-26 ENCOUNTER — OFFICE VISIT (OUTPATIENT)
Dept: INTERNAL MEDICINE CLINIC | Facility: CLINIC | Age: 64
End: 2019-09-26
Payer: COMMERCIAL

## 2019-09-26 VITALS
HEART RATE: 76 BPM | SYSTOLIC BLOOD PRESSURE: 138 MMHG | WEIGHT: 184.6 LBS | BODY MASS INDEX: 27.98 KG/M2 | DIASTOLIC BLOOD PRESSURE: 74 MMHG | HEIGHT: 68 IN | OXYGEN SATURATION: 95 %

## 2019-09-26 DIAGNOSIS — R73.03 PREDIABETES: Primary | ICD-10-CM

## 2019-09-26 DIAGNOSIS — E66.3 OVERWEIGHT (BMI 25.0-29.9): ICD-10-CM

## 2019-09-26 DIAGNOSIS — Z23 NEED FOR INFLUENZA VACCINATION: ICD-10-CM

## 2019-09-26 DIAGNOSIS — R97.20 INCREASED PROSTATE SPECIFIC ANTIGEN (PSA) VELOCITY: ICD-10-CM

## 2019-09-26 DIAGNOSIS — M72.2 PLANTAR FASCIITIS OF RIGHT FOOT: ICD-10-CM

## 2019-09-26 DIAGNOSIS — K21.9 GASTROESOPHAGEAL REFLUX DISEASE WITHOUT ESOPHAGITIS: ICD-10-CM

## 2019-09-26 DIAGNOSIS — E78.5 HYPERLIPIDEMIA, UNSPECIFIED HYPERLIPIDEMIA TYPE: ICD-10-CM

## 2019-09-26 PROCEDURE — 99214 OFFICE O/P EST MOD 30 MIN: CPT | Performed by: INTERNAL MEDICINE

## 2019-09-26 PROCEDURE — 90471 IMMUNIZATION ADMIN: CPT

## 2019-09-26 PROCEDURE — 90682 RIV4 VACC RECOMBINANT DNA IM: CPT

## 2019-09-26 RX ORDER — ROSUVASTATIN CALCIUM 5 MG/1
5 TABLET, COATED ORAL DAILY
Qty: 90 TABLET | Refills: 1 | Status: SHIPPED | OUTPATIENT
Start: 2019-09-26 | End: 2020-07-20 | Stop reason: SDUPTHER

## 2019-09-26 RX ORDER — OMEPRAZOLE 20 MG/1
20 CAPSULE, DELAYED RELEASE ORAL
Qty: 30 CAPSULE | Refills: 0 | Status: SHIPPED | OUTPATIENT
Start: 2019-09-26 | End: 2019-11-19

## 2019-09-26 NOTE — PROGRESS NOTES
BMI Counseling: Body mass index is 28 07 kg/m²  Discussed the patient's BMI with him  The BMI is above normal  Nutrition recommendations include reducing portion sizes  Assessment/Plan:    GERD (gastroesophageal reflux disease)  Start omeprazole 20 mg once daily, ulcer free diet will have the patient see GI for possible upper endoscopy  Hyperlipidemia  Hyperlipidemia controlled continue with current medical regiment recommend a low-cholesterol diet and recommend routine exercise we will continue to monitor the progress  Continue Crestor 5 mg once daily    Overweight (BMI 25 0-29  9)  Obesity -I have counseled patient following healthy and balanced diet, I would like the patient to lose weight, I would like the patient exercise routinely; we will continue monitor the patient's progress  Prediabetes  Pre diabetes -I have counseled the patient to follow a healthy balanced diet, I have counseled patient reduce carbohydrates and sweets in the diet, I would like the patient exercise routinely  I will be checking hemoglobin A1c and comprehensive metabolic panel  Have counseled patient about the prevention of diabetes, and the risk of progression to type 2 diabetes  Problem List Items Addressed This Visit        Digestive    GERD (gastroesophageal reflux disease)     Start omeprazole 20 mg once daily, ulcer free diet will have the patient see GI for possible upper endoscopy  Relevant Medications    omeprazole (PriLOSEC) 20 mg delayed release capsule    Other Relevant Orders    Ambulatory referral to Gastroenterology       Other    Hyperlipidemia     Hyperlipidemia controlled continue with current medical regiment recommend a low-cholesterol diet and recommend routine exercise we will continue to monitor the progress    Continue Crestor 5 mg once daily         Relevant Medications    rosuvastatin (CRESTOR) 5 mg tablet    Other Relevant Orders    Comprehensive metabolic panel    Lipid Panel with Direct LDL reflex    Prediabetes - Primary     Pre diabetes -I have counseled the patient to follow a healthy balanced diet, I have counseled patient reduce carbohydrates and sweets in the diet, I would like the patient exercise routinely  I will be checking hemoglobin A1c and comprehensive metabolic panel  Have counseled patient about the prevention of diabetes, and the risk of progression to type 2 diabetes  Relevant Orders    Hemoglobin A1C    Overweight (BMI 25 0-29  9)     Obesity -I have counseled patient following healthy and balanced diet, I would like the patient to lose weight, I would like the patient exercise routinely; we will continue monitor the patient's progress  Other Visit Diagnoses     Plantar fasciitis of right foot        Relevant Orders    Ambulatory referral to Podiatry    Increased prostate specific antigen (PSA) velocity        Relevant Orders    Ambulatory referral to Urology    Need for influenza vaccination        Relevant Orders    influenza vaccine, 1143-1003, quadrivalent, recombinant, PF, 0 5 mL, for patients 18 yr+ (FLUBLOK) (Completed)          Return to office 6  months  call if any problems  Subjective:      Patient ID: Celeste Knight is a 62 y o  male  HPI 65-year old male coming in for a follow up visit regarding hyperlipidemia, prediabetes, plantar fascitis right foot, GERD; The patient reports me compliant taking medications without untoward side effects the  The patient is here to review his medical condition, update me on the medical condition and the patient reports me no hospitalizations and no ER visits  reports me posterior right foot discomfort starting 1 5 months ago he has got a new shoe 3 months ago, he reports me a lot of walking at work, no injury no twist no strain reports me his symptoms are on off in some days that he does have it it last night to 3 days  Reports me that with his old shoes he had similar symptoms      Reports me he has noticed more acid reflux he reports me an episode 2 weeks ago that lasted 2 days due he did eat hot peppers before this episode  Reports me a burning sensation/substernal pyrosis no dysphagia he does have nocturnal awakenings  No blood the stool no weight loss    The following portions of the patient's history were reviewed and updated as appropriate: allergies, current medications, past family history, past medical history, past social history, past surgical history and problem list     Review of Systems   Constitutional: Negative for activity change, appetite change and unexpected weight change  HENT: Negative for congestion and postnasal drip  Eyes: Negative for visual disturbance  Respiratory: Negative for cough and shortness of breath  Cardiovascular: Negative for chest pain  Gastrointestinal: Negative for abdominal pain, diarrhea, nausea and vomiting  GERD   Musculoskeletal:        Foot pain   Neurological: Negative for dizziness, light-headedness and headaches  Hematological: Negative for adenopathy  Objective:    No follow-ups on file  No results found  No Known Allergies    Past Medical History:   Diagnosis Date    Benign neoplasm of colon      Past Surgical History:   Procedure Laterality Date    INGUINAL HERNIA REPAIR      [place] COLONOSCOPY FLX DX W/COLLJ SPEC WHEN PFRMD N/A 12/18/2018    Procedure: COLONOSCOPY;  Surgeon: Zara Tellez MD;  Location: [facility]; Service: Gastroenterology    TONSILLECTOMY       No current outpatient medications on file prior to visit  No current facility-administered medications on file prior to visit        Family History   Problem Relation Age of Onset    Hypertension Family     Varicose Veins Mother     Parkinsonism Father      Social History     Socioeconomic History    Marital status: /Civil Union     Spouse name: Not on file    Number of children: Not on file    Years of education: Not on file    Highest education level: Not on file   Occupational History    Not on file   Social Needs    Financial resource strain: Not on file    Food insecurity:     Worry: Not on file     Inability: Not on file    Transportation needs:     Medical: Not on file     Non-medical: Not on file   Tobacco Use    Smoking status: Never Smoker    Smokeless tobacco: Never Used   Substance and Sexual Activity    Alcohol use: Yes     Comment: occasional     Drug use: No    Sexual activity: Not on file   Lifestyle    Physical activity:     Days per week: Not on file     Minutes per session: Not on file    Stress: Not on file   Relationships    Social connections:     Talks on phone: Not on file     Gets together: Not on file     Attends Jain service: Not on file     Active member of club or organization: Not on file     Attends meetings of clubs or organizations: Not on file     Relationship status: Not on file    Intimate partner violence:     Fear of current or ex partner: Not on file     Emotionally abused: Not on file     Physically abused: Not on file     Forced sexual activity: Not on file   Other Topics Concern    Not on file   Social History Narrative    Dog      Vitals:    09/26/19 1608   BP: 138/74   Pulse: 76   SpO2: 95%   Weight: 83 7 kg (184 lb 9 6 oz)   Height: 5' 8" (1 727 m)     Results for orders placed or performed in visit on 09/24/19   Hepatitis C antibody   Result Value Ref Range    Hepatitis C Ab Non-reactive Non-reactive   Comprehensive metabolic panel   Result Value Ref Range    Sodium 140 136 - 145 mmol/L    Potassium 3 9 3 5 - 5 3 mmol/L    Chloride 105 100 - 108 mmol/L    CO2 26 21 - 32 mmol/L    ANION GAP 9 4 - 13 mmol/L    BUN 14 5 - 25 mg/dL    Creatinine 1 21 0 60 - 1 30 mg/dL    Glucose, Fasting 113 (H) 65 - 99 mg/dL    Calcium 8 6 8 3 - 10 1 mg/dL    AST 29 5 - 45 U/L    ALT 41 12 - 78 U/L    Alkaline Phosphatase 55 46 - 116 U/L    Total Protein 7 5 6 4 - 8 2 g/dL    Albumin 3 9 3 5 - 5 0 g/dL Total Bilirubin 0 50 0 20 - 1 00 mg/dL    eGFR 63 ml/min/1 73sq m   Hemoglobin A1C   Result Value Ref Range    Hemoglobin A1C 6 0 4 2 - 6 3 %     mg/dl   Lipid Panel with Direct LDL reflex   Result Value Ref Range    Cholesterol 201 (H) 50 - 200 mg/dL    Triglycerides 178 (H) <=150 mg/dL    HDL, Direct 38 (L) 40 - 60 mg/dL    LDL Calculated 127 (H) 0 - 100 mg/dL   PSA, Total Screen   Result Value Ref Range    PSA 3 9 0 0 - 4 0 ng/mL     Weight (last 2 days)     None        Body mass index is 28 07 kg/m²  BP      Temp      Pulse     Resp      SpO2        Vitals:    09/26/19 1608   Weight: 83 7 kg (184 lb 9 6 oz)     Vitals:    09/26/19 1608   Weight: 83 7 kg (184 lb 9 6 oz)       /74   Pulse 76   Ht 5' 8" (1 727 m)   Wt 83 7 kg (184 lb 9 6 oz)   SpO2 95%   BMI 28 07 kg/m²          Physical Exam   Constitutional: He appears well-developed and well-nourished  No distress  HENT:   Head: Normocephalic and atraumatic  Right Ear: External ear normal    Left Ear: External ear normal    Mouth/Throat: Oropharynx is clear and moist    Eyes: Pupils are equal, round, and reactive to light  Conjunctivae are normal  Right eye exhibits no discharge  Left eye exhibits no discharge  No scleral icterus  Neck: Neck supple  Cardiovascular: Normal rate, regular rhythm and normal heart sounds  Exam reveals no gallop and no friction rub  No murmur heard  Pulmonary/Chest: No respiratory distress  He has no wheezes  He has no rales  Abdominal: Soft  Bowel sounds are normal  He exhibits no distension and no mass  There is no tenderness  There is no rebound and no guarding  Musculoskeletal: He exhibits no edema or deformity  Lymphadenopathy:     He has no cervical adenopathy  Neurological: He is alert  Skin: He is not diaphoretic  Psychiatric: He has a normal mood and affect       right foot anterior heel pain

## 2019-09-27 ENCOUNTER — TELEPHONE (OUTPATIENT)
Dept: GASTROENTEROLOGY | Facility: AMBULARY SURGERY CENTER | Age: 64
End: 2019-09-27

## 2019-09-27 NOTE — TELEPHONE ENCOUNTER
Katia Curtis patient    He is having reflux, stomach pain and referred from his pcp for an egd   Please assist with a sooner appt     Call back # [phone]

## 2019-09-27 NOTE — TELEPHONE ENCOUNTER
Called patient, lvm informing of scheduled appt with Dr Zhane Hagen   Told patient will put on wait list

## 2019-09-29 PROBLEM — Z23 NEED FOR INFLUENZA VACCINATION: Status: ACTIVE | Noted: 2019-09-29

## 2019-09-29 PROBLEM — M72.2 PLANTAR FASCIITIS OF RIGHT FOOT: Status: ACTIVE | Noted: 2019-09-29

## 2019-09-29 PROBLEM — R97.20 INCREASED PROSTATE SPECIFIC ANTIGEN (PSA) VELOCITY: Status: ACTIVE | Noted: 2019-09-29

## 2019-09-29 NOTE — ASSESSMENT & PLAN NOTE
Hyperlipidemia controlled continue with current medical regiment recommend a low-cholesterol diet and recommend routine exercise we will continue to monitor the progress    Continue Crestor 5 mg once daily

## 2019-09-29 NOTE — ASSESSMENT & PLAN NOTE
Start omeprazole 20 mg once daily, ulcer free diet will have the patient see GI for possible upper endoscopy

## 2019-10-10 ENCOUNTER — OFFICE VISIT (OUTPATIENT)
Dept: PODIATRY | Facility: CLINIC | Age: 64
End: 2019-10-10
Payer: COMMERCIAL

## 2019-10-10 VITALS
WEIGHT: 185.1 LBS | BODY MASS INDEX: 28.05 KG/M2 | SYSTOLIC BLOOD PRESSURE: 161 MMHG | DIASTOLIC BLOOD PRESSURE: 70 MMHG | HEART RATE: 77 BPM | HEIGHT: 68 IN

## 2019-10-10 DIAGNOSIS — M79.671 RIGHT FOOT PAIN: Primary | ICD-10-CM

## 2019-10-10 DIAGNOSIS — M72.2 PLANTAR FASCIITIS OF RIGHT FOOT: ICD-10-CM

## 2019-10-10 PROCEDURE — 99242 OFF/OP CONSLTJ NEW/EST SF 20: CPT | Performed by: PODIATRIST

## 2019-10-10 PROCEDURE — 20550 NJX 1 TENDON SHEATH/LIGAMENT: CPT | Performed by: PODIATRIST

## 2019-10-10 RX ORDER — TRIAMCINOLONE ACETONIDE 40 MG/ML
20 INJECTION, SUSPENSION INTRA-ARTICULAR; INTRAMUSCULAR ONCE
Status: COMPLETED | OUTPATIENT
Start: 2019-10-10 | End: 2019-10-10

## 2019-10-10 RX ORDER — MELOXICAM 15 MG/1
15 TABLET ORAL DAILY
Qty: 30 TABLET | Refills: 0 | Status: SHIPPED | OUTPATIENT
Start: 2019-10-10 | End: 2019-11-19

## 2019-10-10 RX ORDER — LIDOCAINE HYDROCHLORIDE 10 MG/ML
1 INJECTION, SOLUTION INFILTRATION; PERINEURAL ONCE
Status: COMPLETED | OUTPATIENT
Start: 2019-10-10 | End: 2019-10-10

## 2019-10-10 RX ORDER — BUPIVACAINE HYDROCHLORIDE 5 MG/ML
1 INJECTION, SOLUTION EPIDURAL; INTRACAUDAL ONCE
Status: COMPLETED | OUTPATIENT
Start: 2019-10-10 | End: 2019-10-10

## 2019-10-10 RX ADMIN — BUPIVACAINE HYDROCHLORIDE 1 ML: 5 INJECTION, SOLUTION EPIDURAL; INTRACAUDAL at 17:03

## 2019-10-10 RX ADMIN — LIDOCAINE HYDROCHLORIDE 1 ML: 10 INJECTION, SOLUTION INFILTRATION; PERINEURAL at 17:03

## 2019-10-10 RX ADMIN — TRIAMCINOLONE ACETONIDE 20 MG: 40 INJECTION, SUSPENSION INTRA-ARTICULAR; INTRAMUSCULAR at 17:03

## 2019-10-10 NOTE — PROGRESS NOTES
Assessment/Plan:    Explained to patient that his symptoms are most consistent with plantar fasciitis of the right foot  Discussed etiology and treatment options  Advised patient to refrain from walking barefoot  Stretching exercises were recommended  Heel supports were dispensed  Injected right heel with 0 5 cc Kenalog 40 along with 1 cc 1% xylocaine and 1 cc 0 5% Marcaine  Patient placed on meloxicam 15 mg daily  Foot injection     Date/Time 10/10/2019 5:48 PM     Performed by  River Perry DPM     Authorized by River Perry DPM      Universal Protocol Consent: Verbal consent obtained  Risks and benefits: risks, benefits and alternatives were discussed  Consent given by: patient  Patient understanding: patient states understanding of the procedure being performed        Site preparation: Isopropyl alcohol    Local anesthesia used: yes     Anesthesia   Local anesthesia used: yes  Local Anesthetic: lidocaine 1% without epinephrine and bupivacaine 0 5% without epinephrine     Procedure Details   Procedure Notes: Injected right heel with 0 5 cc Kenalog 40 along with 1 cc 1% xylocaine and 1 cc 0 5% Marcaine  No problem-specific Assessment & Plan notes found for this encounter  Diagnoses and all orders for this visit:    Right foot pain    Plantar fasciitis of right foot  -     Ambulatory referral to Podiatry  -     bupivacaine (PF) (MARCAINE) 0 5 % injection 1 mL  -     lidocaine (XYLOCAINE) 1 % injection 1 mL  -     triamcinolone acetonide (KENALOG-40) 40 mg/mL injection 20 mg  -     meloxicam (MOBIC) 15 mg tablet; Take 1 tablet (15 mg total) by mouth daily          Subjective:      Patient ID: Magnolia Barorn is a 62 y o  male  HPI     Patient, a 81-year-old male in apparent good health presents with right heel pain  Patient has been in pain for approximately 6-8 weeks  No trauma is related  No left heel pain present  Pain increases the more that the patient is on his feet  He has had no medical care for this problem as of yet  Patient states that his pain is moderate to severe in intensity  The following portions of the patient's history were reviewed and updated as appropriate: allergies, current medications, past family history, past medical history, past social history, past surgical history and problem list     Review of Systems   Constitutional: Negative  Respiratory: Negative  Cardiovascular: Negative  Gastrointestinal: Negative  Musculoskeletal: Negative  Neurological: Negative  Objective:      /70   Pulse 77   Ht 5' 8" (1 727 m)   Wt 84 kg (185 lb 1 6 oz)   BMI 28 14 kg/m²          Physical Exam   Constitutional: He is oriented to person, place, and time  He appears well-developed and well-nourished  Cardiovascular: Regular rhythm and intact distal pulses  Musculoskeletal: He exhibits tenderness  He exhibits no edema  Pain with palpation medial aspect right heel at fascia insertion into the calcaneus  No bruising or swelling noted  Neurological: He is alert and oriented to person, place, and time  No sensory deficit  He exhibits normal muscle tone  Skin: Skin is warm  No rash noted  No erythema

## 2019-10-18 ENCOUNTER — TELEPHONE (OUTPATIENT)
Dept: GASTROENTEROLOGY | Facility: AMBULARY SURGERY CENTER | Age: 64
End: 2019-10-18

## 2019-10-22 ENCOUNTER — OFFICE VISIT (OUTPATIENT)
Dept: GASTROENTEROLOGY | Facility: AMBULARY SURGERY CENTER | Age: 64
End: 2019-10-22
Payer: COMMERCIAL

## 2019-10-22 VITALS
TEMPERATURE: 98.1 F | RESPIRATION RATE: 16 BRPM | WEIGHT: 183 LBS | HEART RATE: 76 BPM | HEIGHT: 68 IN | BODY MASS INDEX: 27.74 KG/M2 | DIASTOLIC BLOOD PRESSURE: 82 MMHG | SYSTOLIC BLOOD PRESSURE: 144 MMHG

## 2019-10-22 DIAGNOSIS — K21.9 GASTROESOPHAGEAL REFLUX DISEASE WITHOUT ESOPHAGITIS: Primary | ICD-10-CM

## 2019-10-22 DIAGNOSIS — Z86.010 HISTORY OF COLON POLYPS: ICD-10-CM

## 2019-10-22 PROCEDURE — 99214 OFFICE O/P EST MOD 30 MIN: CPT | Performed by: INTERNAL MEDICINE

## 2019-10-22 NOTE — H&P (VIEW-ONLY)
Follow-up Note -  [facility]  Zack Smith 1955 male         Reason:  GERD    HPI:  Mr Radha Vogel came in because of acid reflux symptoms with worsening recently  He was seen by his PCP and was given omeprazole for few weeks with significant improvement  Patient has regular bowel movements and denies any blood or mucus in the stool  Appetite is good and denies any recent weight loss  Denies any abdominal pain, nausea, or vomiting  Denies any difficulty swallowing  Reports taking meloxicam for couple of weeks because of plantar fasciitis    He had colonoscopy in December 2018 and had colon polyps removed  REVIEW OF SYSTEMS: Review of Systems   Constitutional: Negative for activity change, appetite change, chills, diaphoresis, fatigue, fever and unexpected weight change  HENT: Negative for ear discharge, ear pain, facial swelling, hearing loss, nosebleeds, sore throat, tinnitus and voice change  Eyes: Negative for pain, discharge, redness, itching and visual disturbance  Respiratory: Negative for apnea, cough, chest tightness, shortness of breath and wheezing  Cardiovascular: Negative for chest pain and palpitations  Gastrointestinal:        As noted in HPI   Endocrine: Negative for cold intolerance, heat intolerance and polyuria  Genitourinary: Negative for difficulty urinating, dysuria, flank pain, hematuria and urgency  Musculoskeletal: Negative for arthralgias, back pain, gait problem, joint swelling and myalgias  Skin: Negative for rash and wound  Neurological: Negative for dizziness, tremors, seizures, speech difficulty, light-headedness, numbness and headaches  Hematological: Negative for adenopathy  Does not bruise/bleed easily  Psychiatric/Behavioral: Negative for agitation, behavioral problems and confusion  The patient is not nervous/anxious           Past Medical History:   Diagnosis Date    Benign neoplasm of colon       Past Surgical History: Procedure Laterality Date    INGUINAL HERNIA REPAIR      [place] COLONOSCOPY FLX DX W/COLLJ SPEC WHEN PFRMD N/A 12/18/2018    Procedure: COLONOSCOPY;  Surgeon: Kunal Pereira MD;  [facility]; Service: Gastroenterology    TONSILLECTOMY       Social History     Socioeconomic History    Marital status: /Civil Union     Spouse name: Not on file    Number of children: Not on file    Years of education: Not on file    Highest education level: Not on file   Occupational History    Not on file   Social Needs    Financial resource strain: Not on file    Food insecurity:     Worry: Not on file     Inability: Not on file    Transportation needs:     Medical: Not on file     Non-medical: Not on file   Tobacco Use    Smoking status: Never Smoker    Smokeless tobacco: Never Used   Substance and Sexual Activity    Alcohol use: Yes     Comment: occasional     Drug use: No    Sexual activity: Not on file   Lifestyle    Physical activity:     Days per week: Not on file     Minutes per session: Not on file    Stress: Not on file   Relationships    Social connections:     Talks on phone: Not on file     Gets together: Not on file     Attends Muslim service: Not on file     Active member of club or organization: Not on file     Attends meetings of clubs or organizations: Not on file     Relationship status: Not on file    Intimate partner violence:     Fear of current or ex partner: Not on file     Emotionally abused: Not on file     Physically abused: Not on file     Forced sexual activity: Not on file   Other Topics Concern    Not on file   Social History Narrative    Dog      Family History   Problem Relation Age of Onset    Hypertension Family     Varicose Veins Mother     Parkinsonism Father      Patient has no known allergies    Current Outpatient Medications   Medication Sig Dispense Refill    meloxicam (MOBIC) 15 mg tablet Take 1 tablet (15 mg total) by mouth daily 30 tablet 0    omeprazole (PriLOSEC) 20 mg delayed release capsule Take 1 capsule (20 mg total) by mouth daily before breakfast 30 capsule 0    rosuvastatin (CRESTOR) 5 mg tablet Take 1 tablet (5 mg total) by mouth daily 90 tablet 1     No current facility-administered medications for this visit  Blood pressure 144/82, pulse 76, temperature 98 1 °F (36 7 °C), temperature source Tympanic, resp  rate 16, height 5' 8" (1 727 m), weight 83 kg (183 lb)  PHYSICAL EXAM: Physical Exam   Constitutional: He is oriented to person, place, and time  He appears well-developed  HENT:   Head: Normocephalic and atraumatic  Mouth/Throat: Oropharynx is clear and moist    Eyes: Pupils are equal, round, and reactive to light  Conjunctivae are normal  Right eye exhibits no discharge  Left eye exhibits no discharge  No scleral icterus  Neck: Neck supple  No JVD present  No tracheal deviation present  No thyromegaly present  Cardiovascular: Normal rate, regular rhythm, normal heart sounds and intact distal pulses  Exam reveals no gallop and no friction rub  No murmur heard  Pulmonary/Chest: Effort normal and breath sounds normal  No respiratory distress  He has no wheezes  He has no rales  He exhibits no tenderness  Abdominal: Soft  Bowel sounds are normal  He exhibits no distension and no mass  There is no tenderness  There is no rebound and no guarding  No hernia  Musculoskeletal: He exhibits no edema  Lymphadenopathy:     He has no cervical adenopathy  Neurological: He is alert and oriented to person, place, and time  Skin: Skin is warm and dry  No rash noted  No erythema  Psychiatric: He has a normal mood and affect   His behavior is normal  Thought content normal         No results found for: WBC, HGB, HCT, MCV, PLT  Lab Results   Component Value Date    CALCIUM 8 6 09/24/2019    K 3 9 09/24/2019    CO2 26 09/24/2019     09/24/2019    BUN 14 09/24/2019    CREATININE 1 21 09/24/2019     Lab Results   Component Value Date    ALT 41 09/24/2019    AST 29 09/24/2019    ALKPHOS 55 09/24/2019     No results found for: INR, PROTIME    No results found  ASSESSMENT & PLAN:    GERD (gastroesophageal reflux disease)  Gastroesophageal reflux disease - Patient has the symptoms of chronic acid reflux for a long time  Possible hiatal hernia or LES weakness  Should rule out Belcher's esophagus because of chronic symptoms         -Patient was explained about the lifestyle and dietary modifications  Advised to avoid fatty foods, chocolates, caffeine, alcohol and any other triggering foods  Avoid eating for at least 3 hours before going to bed         -discussed about long-term side effects from omeprazole and advised him to try Pepcid    -schedule for EGD    -Patient was explained about  the risks and benefits of the procedure  Risks including but not limited to bleeding, infection, perforation were explained in detail  Also explained about less than 100% sensitivity with the exam and other alternatives            History of colon polyps    -Repeat colonoscopy in 3 years

## 2019-10-22 NOTE — PROGRESS NOTES
Follow-up Note -  [facility]  Joe Macias 1955 male         Reason:  GERD    HPI:  Mr Vasquez Sender came in because of acid reflux symptoms with worsening recently  He was seen by his PCP and was given omeprazole for few weeks with significant improvement  Patient has regular bowel movements and denies any blood or mucus in the stool  Appetite is good and denies any recent weight loss  Denies any abdominal pain, nausea, or vomiting  Denies any difficulty swallowing  Reports taking meloxicam for couple of weeks because of plantar fasciitis    He had colonoscopy in December 2018 and had colon polyps removed  REVIEW OF SYSTEMS: Review of Systems   Constitutional: Negative for activity change, appetite change, chills, diaphoresis, fatigue, fever and unexpected weight change  HENT: Negative for ear discharge, ear pain, facial swelling, hearing loss, nosebleeds, sore throat, tinnitus and voice change  Eyes: Negative for pain, discharge, redness, itching and visual disturbance  Respiratory: Negative for apnea, cough, chest tightness, shortness of breath and wheezing  Cardiovascular: Negative for chest pain and palpitations  Gastrointestinal:        As noted in HPI   Endocrine: Negative for cold intolerance, heat intolerance and polyuria  Genitourinary: Negative for difficulty urinating, dysuria, flank pain, hematuria and urgency  Musculoskeletal: Negative for arthralgias, back pain, gait problem, joint swelling and myalgias  Skin: Negative for rash and wound  Neurological: Negative for dizziness, tremors, seizures, speech difficulty, light-headedness, numbness and headaches  Hematological: Negative for adenopathy  Does not bruise/bleed easily  Psychiatric/Behavioral: Negative for agitation, behavioral problems and confusion  The patient is not nervous/anxious           Past Medical History:   Diagnosis Date    Benign neoplasm of colon       Past Surgical History: Procedure Laterality Date    INGUINAL HERNIA REPAIR      [place] COLONOSCOPY FLX DX W/COLLJ SPEC WHEN PFRMD N/A 12/18/2018    Procedure: COLONOSCOPY;  Surgeon: Shree Thakkar MD;  [facility]; Service: Gastroenterology    TONSILLECTOMY       Social History     Socioeconomic History    Marital status: /Civil Union     Spouse name: Not on file    Number of children: Not on file    Years of education: Not on file    Highest education level: Not on file   Occupational History    Not on file   Social Needs    Financial resource strain: Not on file    Food insecurity:     Worry: Not on file     Inability: Not on file    Transportation needs:     Medical: Not on file     Non-medical: Not on file   Tobacco Use    Smoking status: Never Smoker    Smokeless tobacco: Never Used   Substance and Sexual Activity    Alcohol use: Yes     Comment: occasional     Drug use: No    Sexual activity: Not on file   Lifestyle    Physical activity:     Days per week: Not on file     Minutes per session: Not on file    Stress: Not on file   Relationships    Social connections:     Talks on phone: Not on file     Gets together: Not on file     Attends Nondenominational service: Not on file     Active member of club or organization: Not on file     Attends meetings of clubs or organizations: Not on file     Relationship status: Not on file    Intimate partner violence:     Fear of current or ex partner: Not on file     Emotionally abused: Not on file     Physically abused: Not on file     Forced sexual activity: Not on file   Other Topics Concern    Not on file   Social History Narrative    Dog      Family History   Problem Relation Age of Onset    Hypertension Family     Varicose Veins Mother     Parkinsonism Father      Patient has no known allergies    Current Outpatient Medications   Medication Sig Dispense Refill    meloxicam (MOBIC) 15 mg tablet Take 1 tablet (15 mg total) by mouth daily 30 tablet 0    omeprazole (PriLOSEC) 20 mg delayed release capsule Take 1 capsule (20 mg total) by mouth daily before breakfast 30 capsule 0    rosuvastatin (CRESTOR) 5 mg tablet Take 1 tablet (5 mg total) by mouth daily 90 tablet 1     No current facility-administered medications for this visit  Blood pressure 144/82, pulse 76, temperature 98 1 °F (36 7 °C), temperature source Tympanic, resp  rate 16, height 5' 8" (1 727 m), weight 83 kg (183 lb)  PHYSICAL EXAM: Physical Exam   Constitutional: He is oriented to person, place, and time  He appears well-developed  HENT:   Head: Normocephalic and atraumatic  Mouth/Throat: Oropharynx is clear and moist    Eyes: Pupils are equal, round, and reactive to light  Conjunctivae are normal  Right eye exhibits no discharge  Left eye exhibits no discharge  No scleral icterus  Neck: Neck supple  No JVD present  No tracheal deviation present  No thyromegaly present  Cardiovascular: Normal rate, regular rhythm, normal heart sounds and intact distal pulses  Exam reveals no gallop and no friction rub  No murmur heard  Pulmonary/Chest: Effort normal and breath sounds normal  No respiratory distress  He has no wheezes  He has no rales  He exhibits no tenderness  Abdominal: Soft  Bowel sounds are normal  He exhibits no distension and no mass  There is no tenderness  There is no rebound and no guarding  No hernia  Musculoskeletal: He exhibits no edema  Lymphadenopathy:     He has no cervical adenopathy  Neurological: He is alert and oriented to person, place, and time  Skin: Skin is warm and dry  No rash noted  No erythema  Psychiatric: He has a normal mood and affect   His behavior is normal  Thought content normal         No results found for: WBC, HGB, HCT, MCV, PLT  Lab Results   Component Value Date    CALCIUM 8 6 09/24/2019    K 3 9 09/24/2019    CO2 26 09/24/2019     09/24/2019    BUN 14 09/24/2019    CREATININE 1 21 09/24/2019     Lab Results   Component Value Date    ALT 41 09/24/2019    AST 29 09/24/2019    ALKPHOS 55 09/24/2019     No results found for: INR, PROTIME    No results found  ASSESSMENT & PLAN:    GERD (gastroesophageal reflux disease)  Gastroesophageal reflux disease - Patient has the symptoms of chronic acid reflux for a long time  Possible hiatal hernia or LES weakness  Should rule out Belcher's esophagus because of chronic symptoms         -Patient was explained about the lifestyle and dietary modifications  Advised to avoid fatty foods, chocolates, caffeine, alcohol and any other triggering foods  Avoid eating for at least 3 hours before going to bed         -discussed about long-term side effects from omeprazole and advised him to try Pepcid    -schedule for EGD    -Patient was explained about  the risks and benefits of the procedure  Risks including but not limited to bleeding, infection, perforation were explained in detail  Also explained about less than 100% sensitivity with the exam and other alternatives            History of colon polyps    -Repeat colonoscopy in 3 years

## 2019-10-22 NOTE — ASSESSMENT & PLAN NOTE
Gastroesophageal reflux disease - Patient has the symptoms of chronic acid reflux for a long time  Possible hiatal hernia or LES weakness  Should rule out Belcher's esophagus because of chronic symptoms         -Patient was explained about the lifestyle and dietary modifications  Advised to avoid fatty foods, chocolates, caffeine, alcohol and any other triggering foods  Avoid eating for at least 3 hours before going to bed         -discussed about long-term side effects from omeprazole and advised him to try Pepcid    -schedule for EGD    -Patient was explained about  the risks and benefits of the procedure  Risks including but not limited to bleeding, infection, perforation were explained in detail  Also explained about less than 100% sensitivity with the exam and other alternatives

## 2019-11-08 ENCOUNTER — TELEPHONE (OUTPATIENT)
Dept: GASTROENTEROLOGY | Facility: CLINIC | Age: 64
End: 2019-11-08

## 2019-11-08 NOTE — TELEPHONE ENCOUNTER
Patients GI provider:  Dr Violeta Obregon    Number to return call: ( [phone]    Reason for call: Pt calling to change his procedure date       Scheduled procedure/appointment date if applicable: Apt/procedure - 11/20 EGD

## 2019-11-12 ENCOUNTER — OFFICE VISIT (OUTPATIENT)
Dept: PODIATRY | Facility: CLINIC | Age: 64
End: 2019-11-12
Payer: COMMERCIAL

## 2019-11-12 VITALS
DIASTOLIC BLOOD PRESSURE: 99 MMHG | HEART RATE: 69 BPM | BODY MASS INDEX: 28.04 KG/M2 | SYSTOLIC BLOOD PRESSURE: 148 MMHG | HEIGHT: 68 IN | WEIGHT: 185 LBS

## 2019-11-12 DIAGNOSIS — M72.2 PLANTAR FASCIITIS OF RIGHT FOOT: Primary | ICD-10-CM

## 2019-11-12 PROCEDURE — 99212 OFFICE O/P EST SF 10 MIN: CPT | Performed by: PODIATRIST

## 2019-11-12 NOTE — PROGRESS NOTES
Patient presents for assessment of right heel  He was treated with cortisone injection and meloxicam for plantar fasciitis at last visit  He relates minimal if any pain at this time  Patient told to wean off meloxicam   If he needs more medication he can contact me and will be sent  No additional treatment needed for now  Sp marcus

## 2019-11-19 ENCOUNTER — ANESTHESIA EVENT (OUTPATIENT)
Dept: GASTROENTEROLOGY | Facility: AMBULARY SURGERY CENTER | Age: 64
End: 2019-11-19

## 2019-11-19 NOTE — PRE-PROCEDURE INSTRUCTIONS
Pre-Surgery Instructions:   Medication Instructions    rosuvastatin (CRESTOR) 5 mg tablet Patient was instructed by Physician and understands

## 2019-11-20 ENCOUNTER — ANESTHESIA (OUTPATIENT)
Dept: GASTROENTEROLOGY | Facility: AMBULARY SURGERY CENTER | Age: 64
End: 2019-11-20

## 2019-11-20 ENCOUNTER — HOSPITAL ENCOUNTER (OUTPATIENT)
Dept: GASTROENTEROLOGY | Facility: AMBULARY SURGERY CENTER | Age: 64
Setting detail: OUTPATIENT SURGERY
Discharge: HOME | End: 2019-11-20
Attending: INTERNAL MEDICINE
Payer: COMMERCIAL

## 2019-11-20 VITALS
SYSTOLIC BLOOD PRESSURE: 129 MMHG | RESPIRATION RATE: 18 BRPM | HEIGHT: 68 IN | OXYGEN SATURATION: 95 % | WEIGHT: 185 LBS | HEART RATE: 64 BPM | BODY MASS INDEX: 28.04 KG/M2 | DIASTOLIC BLOOD PRESSURE: 83 MMHG | TEMPERATURE: 96.5 F

## 2019-11-20 DIAGNOSIS — K21.9 GASTROESOPHAGEAL REFLUX DISEASE WITHOUT ESOPHAGITIS: ICD-10-CM

## 2019-11-20 PROCEDURE — 88305 TISSUE EXAM BY PATHOLOGIST: CPT | Performed by: PATHOLOGY

## 2019-11-20 PROCEDURE — 43239 EGD BIOPSY SINGLE/MULTIPLE: CPT | Performed by: INTERNAL MEDICINE

## 2019-11-20 RX ORDER — PROPOFOL 10 MG/ML
INJECTION, EMULSION INTRAVENOUS AS NEEDED
Status: DISCONTINUED | OUTPATIENT
Start: 2019-11-20 | End: 2019-11-20 | Stop reason: SURG

## 2019-11-20 RX ORDER — LIDOCAINE HYDROCHLORIDE 10 MG/ML
INJECTION, SOLUTION INFILTRATION; PERINEURAL AS NEEDED
Status: DISCONTINUED | OUTPATIENT
Start: 2019-11-20 | End: 2019-11-20 | Stop reason: SURG

## 2019-11-20 RX ORDER — SODIUM CHLORIDE, SODIUM LACTATE, POTASSIUM CHLORIDE, CALCIUM CHLORIDE 600; 310; 30; 20 MG/100ML; MG/100ML; MG/100ML; MG/100ML
75 INJECTION, SOLUTION INTRAVENOUS CONTINUOUS
Status: DISCONTINUED | OUTPATIENT
Start: 2019-11-20 | End: 2019-11-24 | Stop reason: HOSPADM

## 2019-11-20 RX ADMIN — PROPOFOL 100 MG: 10 INJECTION, EMULSION INTRAVENOUS at 08:20

## 2019-11-20 RX ADMIN — SODIUM CHLORIDE, SODIUM LACTATE, POTASSIUM CHLORIDE, AND CALCIUM CHLORIDE: .6; .31; .03; .02 INJECTION, SOLUTION INTRAVENOUS at 08:15

## 2019-11-20 RX ADMIN — LIDOCAINE HYDROCHLORIDE 100 MG: 10 INJECTION, SOLUTION INFILTRATION; PERINEURAL at 08:20

## 2019-11-20 NOTE — INTERVAL H&P NOTE
H&P reviewed  After examining the patient I find no changes in the patients condition since the H&P had been written      Vitals:    11/20/19 0731   BP: 127/87   Pulse: 66   Resp: 14   Temp: (!) 96 5 °F (35 8 °C)   SpO2: 97%

## 2019-11-20 NOTE — ANESTHESIA PREPROCEDURE EVALUATION
Review of Systems/Medical History  Patient summary reviewed  Chart reviewed  No history of anesthetic complications     Cardiovascular  Hyperlipidemia,    Pulmonary       GI/Hepatic            Endo/Other     GYN       Hematology   Musculoskeletal       Neurology   Psychology           Physical Exam    Airway    Mallampati score: II  TM Distance: >3 FB  Neck ROM: full     Dental       Cardiovascular  Rhythm: regular, Rate: normal,     Pulmonary  Breath sounds clear to auscultation,     Other Findings        Anesthesia Plan  ASA Score- 2     Anesthesia Type- IV sedation with anesthesia with ASA Monitors  Additional Monitors:   Airway Plan:         Plan Factors-    Induction- intravenous  Postoperative Plan-     Informed Consent- Anesthetic plan and risks discussed with patient  I personally reviewed this patient with the CRNA  Discussed and agreed on the Anesthesia Plan with the CRNA  Michael Chavira

## 2019-11-27 ENCOUNTER — TELEPHONE (OUTPATIENT)
Dept: GASTROENTEROLOGY | Facility: AMBULARY SURGERY CENTER | Age: 64
End: 2019-11-27

## 2019-11-27 NOTE — TELEPHONE ENCOUNTER
----- Message from Nick Caicedo MD sent at 11/26/2019  5:57 PM EST -----  Called patient couple of times at both home and cell numbers  Left a detailed voice message    Distal esophageal biopsies are benign and no evidence of any Belcher's

## 2019-12-11 ENCOUNTER — OFFICE VISIT (OUTPATIENT)
Dept: UROLOGY | Facility: CLINIC | Age: 64
End: 2019-12-11
Payer: COMMERCIAL

## 2019-12-11 VITALS
WEIGHT: 185 LBS | BODY MASS INDEX: 28.04 KG/M2 | HEART RATE: 80 BPM | HEIGHT: 68 IN | SYSTOLIC BLOOD PRESSURE: 120 MMHG | DIASTOLIC BLOOD PRESSURE: 80 MMHG

## 2019-12-11 DIAGNOSIS — R97.20 ELEVATED PSA: Primary | ICD-10-CM

## 2019-12-11 PROCEDURE — 99244 OFF/OP CNSLTJ NEW/EST MOD 40: CPT | Performed by: UROLOGY

## 2019-12-11 NOTE — PROGRESS NOTES
Diagnoses and all orders for this visit:    Elevated PSA  -     PSA, total and free; Future            Assessment and plan:     Marie Christianson is a 62 y o  male presents for prostate cancer screening      We discussed the role of PSA as a screening biomarker for prostate cancer, including the benefits and current controversies  We discussed that prostate cancer is the most common non-skin  cancer in men in the [place], and the second leading cause of cancer death in men  One in six men will be diagnosed with prostate cancer during his lifetime  Over 30,000 men die each year from prostate cancer; however, early detection may save lives  A variety of factors can affect PSA levels and should be considered in the interpretation of results  The three most common prostate diseases- prostatitis, benign prostatic hyperplasia (BPH), and prostate cancer-may cause elevated PSA levels in the blood  Men choosing to undergo PSA testing should know that some important factors may influence results  - Change in PSA levels over time, known as PSA velocity, is used to assess both cancer risk and aggressiveness  - Blood PSA levels tend to increase with age  - Larger prostates produce larger amounts of PSA  In the setting of a negative rectal exam and a PSA just within normal limits and no familial history I have recommended continued monitoring on an annual basis  Patient is very amenable to this  He will return next year to our practice with a PSA and % free prior to the visit  We did discuss a prostate biopsy should the need arise down the line      Radha Duran MD      Chief Complaint     Elevated PSA      History of Present Illness     Marie Christianson is a 62 y o  male referred in consultation by Juan Morales DO for  prostate cancer screening  He has been undergoing routine prostate cancer screening directed by his PCP    Most recent PSA assessments are as follows:    Lab Results Component Value Date    PSA 3 9 09/24/2019    PSA 2 4 08/25/2017    PSA 2 5 09/13/2016       Patient    Denies any lower urinary tract symptoms   He   denies a history of prostatitis or urinary tract infections  He   denies familial history of genitourinary associated malignancies   Pertinent medical  comorbidities include nil        Detailed Urologic History     - please refer to HPI    Review of Systems     Review of Systems   Constitutional: Negative for activity change and fatigue  HENT: Negative for congestion  Eyes: Negative for visual disturbance  Respiratory: Negative for shortness of breath and wheezing  Cardiovascular: Negative for chest pain and leg swelling  Gastrointestinal: Negative for abdominal pain  Endocrine: Negative for polyuria  Genitourinary: Negative for dysuria, flank pain, hematuria and urgency  Musculoskeletal: Negative for back pain  Allergic/Immunologic: Negative for immunocompromised state  Neurological: Negative for dizziness and numbness  Psychiatric/Behavioral: Negative for dysphoric mood  All other systems reviewed and are negative  Allergies     No Known Allergies    Physical Exam     Physical Exam   Constitutional: He is oriented to person, place, and time  He appears well-developed and well-nourished  No distress  HENT:   Head: Normocephalic and atraumatic  Eyes: EOM are normal    Neck: Normal range of motion  Cardiovascular:   Negative lower extremity   Pulmonary/Chest: Effort normal and breath sounds normal    Abdominal: Soft  Genitourinary:   Genitourinary Comments: Rectal exam reveals a 40-50 g prostate no nodules or induration   Musculoskeletal: Normal range of motion  Neurological: He is alert and oriented to person, place, and time  Skin: Skin is warm  Psychiatric: He has a normal mood and affect   His behavior is normal            Vital Signs  Vitals:    12/11/19 1423   BP: 120/80   BP Location: Left arm   Patient Position: Sitting   Cuff Size: Adult   Pulse: 80   Weight: 83 9 kg (185 lb)   Height: 5' 8" (1 727 m)         Current Medications       Current Outpatient Medications:     rosuvastatin (CRESTOR) 5 mg tablet, Take 1 tablet (5 mg total) by mouth daily, Disp: 90 tablet, Rfl: 1      Active Problems     Patient Active Problem List   Diagnosis    Hyperlipidemia    Prediabetes    Overweight (BMI 25 0-29  9)    GERD (gastroesophageal reflux disease)    History of colon polyps    Screening for malignant neoplasm of colon    Asymptomatic varicose veins of both lower extremities    Chronic left shoulder pain    Plantar fasciitis of right foot    Increased prostate specific antigen (PSA) velocity    Need for influenza vaccination         Past Medical History     Past Medical History:   Diagnosis Date    Benign neoplasm of colon     Colon polyp     GERD (gastroesophageal reflux disease)     mild diet controlled    Hyperlipidemia          Surgical History     Past Surgical History:   Procedure Laterality Date    INGUINAL HERNIA REPAIR Bilateral     MOHS SURGERY  2000    nose    [place] COLONOSCOPY FLX DX W/COLLJ SPEC WHEN PFRMD N/A 12/18/2018    Procedure: COLONOSCOPY;  Surgeon: Zoie Estrada MD;  [facility];   Service: Gastroenterology    TONSILLECTOMY           Family History     Family History   Problem Relation Age of Onset    Hypertension Family     Varicose Veins Mother     Parkinsonism Father     No Known Problems Sister     No Known Problems Brother     No Known Problems Sister     No Known Problems Sister     No Known Problems Sister     No Known Problems Sister     No Known Problems Brother          Social History     Social History     Social History     Tobacco Use   Smoking Status Never Smoker   Smokeless Tobacco Never Used         Pertinent Lab Values     Lab Results   Component Value Date    CREATININE 1 21 09/24/2019       Lab Results   Component Value Date    PSA 3 9 09/24/2019 PSA 2 4 08/25/2017    PSA 2 5 09/13/2016       @RESULTRCNT(1H])@      Pertinent Imaging      - n/a    Portions of the record may have been created with voice recognition software   Occasional wrong word or "sound a like" substitutions may have occurred due to the inherent limitations of voice recognition software   Read the chart carefully and recognize, using context, where substitutions have occurred

## 2020-01-21 ENCOUNTER — OFFICE VISIT (OUTPATIENT)
Dept: INTERNAL MEDICINE CLINIC | Facility: CLINIC | Age: 65
End: 2020-01-21
Payer: COMMERCIAL

## 2020-01-21 VITALS
WEIGHT: 184 LBS | HEART RATE: 75 BPM | BODY MASS INDEX: 27.89 KG/M2 | OXYGEN SATURATION: 97 % | SYSTOLIC BLOOD PRESSURE: 116 MMHG | HEIGHT: 68 IN | TEMPERATURE: 98.2 F | DIASTOLIC BLOOD PRESSURE: 72 MMHG

## 2020-01-21 DIAGNOSIS — R73.03 PREDIABETES: Primary | ICD-10-CM

## 2020-01-21 DIAGNOSIS — K21.9 GASTROESOPHAGEAL REFLUX DISEASE WITHOUT ESOPHAGITIS: ICD-10-CM

## 2020-01-21 DIAGNOSIS — E66.3 OVERWEIGHT (BMI 25.0-29.9): ICD-10-CM

## 2020-01-21 DIAGNOSIS — E78.5 HYPERLIPIDEMIA, UNSPECIFIED HYPERLIPIDEMIA TYPE: ICD-10-CM

## 2020-01-21 PROCEDURE — 99214 OFFICE O/P EST MOD 30 MIN: CPT | Performed by: INTERNAL MEDICINE

## 2020-01-21 NOTE — PROGRESS NOTES
BMI Counseling: Body mass index is 27 98 kg/m²  The BMI is above normal  Nutrition recommendations include decreasing portion sizes and moderation in carbohydrate intake  Assessment/Plan:    Gastroesophageal reflux disease without esophagitis  Clinically improved, review the EGD recommend ulcer free diet GI did recommend over-the-counter Pepcid p r n  Hyperlipidemia  Hyperlipidemia controlled continue with current medical regiment recommend a low-cholesterol diet and recommend routine exercise we will continue to monitor the progress  Continue Crestor 5 mg once daily    Overweight (BMI 25 0-29  9)  I have counselled the pt to follow a healthy and balanced diet ,and recommend routine exercise  Prediabetes  Pre diabetes -I have counseled the patient to follow a healthy balanced diet, I have counseled patient reduce carbohydrates and sweets in the diet, I would like the patient exercise routinely  I will be checking hemoglobin A1c and comprehensive metabolic panel  Have counseled patient about the prevention of diabetes, and the risk of progression to type 2 diabetes  Problem List Items Addressed This Visit        Digestive    Gastroesophageal reflux disease without esophagitis     Clinically improved, review the EGD recommend ulcer free diet GI did recommend over-the-counter Pepcid p r n  Other    Hyperlipidemia     Hyperlipidemia controlled continue with current medical regiment recommend a low-cholesterol diet and recommend routine exercise we will continue to monitor the progress  Continue Crestor 5 mg once daily         Relevant Orders    Lipid Panel with Direct LDL reflex    Prediabetes - Primary     Pre diabetes -I have counseled the patient to follow a healthy balanced diet, I have counseled patient reduce carbohydrates and sweets in the diet, I would like the patient exercise routinely  I will be checking hemoglobin A1c and comprehensive metabolic panel    Have counseled patient about the prevention of diabetes, and the risk of progression to type 2 diabetes  Relevant Orders    Comprehensive metabolic panel    Hemoglobin A1C    Overweight (BMI 25 0-29  9)     I have counselled the pt to follow a healthy and balanced diet ,and recommend routine exercise  Return to office  6 months  call if any problems  Subjective:      Patient ID: Aly Moss is a 62 y o  male  HPI 65-year old male coming in for a follow up visit regarding prediabetes, hyperlipidemia, GERD, overweight; The patient reports me compliant taking medications without untoward side effects the  The patient is here to review his medical condition, update me on the medical condition and the patient reports me no hospitalizations and no ER visits  Patient reports me overall is doing well reports me stress taking care of his aging mother in his house  He works full-time for [employer]  He has not been exercising but trying to follow healthy diet  He is here to review his laboratories  The following portions of the patient's history were reviewed and updated as appropriate: allergies, current medications, past family history, past medical history, past social history, past surgical history and problem list     Review of Systems   Constitutional: Negative for activity change, appetite change and unexpected weight change  HENT: Negative for congestion and postnasal drip  Eyes: Negative for visual disturbance  Respiratory: Negative for cough and shortness of breath  Cardiovascular: Negative for chest pain  Gastrointestinal: Negative for abdominal pain, diarrhea, nausea and vomiting  Neurological: Negative for dizziness, light-headedness and headaches  Hematological: Negative for adenopathy  Objective:    No follow-ups on file  No results found        No Known Allergies    Past Medical History:   Diagnosis Date    Benign neoplasm of colon     Colon polyp     GERD (gastroesophageal reflux disease)     mild diet controlled    Hyperlipidemia      Past Surgical History:   Procedure Laterality Date    INGUINAL HERNIA REPAIR Bilateral     MOHS SURGERY  2000    nose    UT COLONOSCOPY FLX DX W/COLLJ SPEC WHEN PFRMD N/A 12/18/2018    Procedure: COLONOSCOPY;  Surgeon: Laura Villalpando MD;  [facility]; Service: Gastroenterology    TONSILLECTOMY       Current Outpatient Medications on File Prior to Visit   Medication Sig Dispense Refill    rosuvastatin (CRESTOR) 5 mg tablet Take 1 tablet (5 mg total) by mouth daily 90 tablet 1     No current facility-administered medications on file prior to visit        Family History   Problem Relation Age of Onset    Hypertension Family     Varicose Veins Mother     Parkinsonism Father     No Known Problems Sister     No Known Problems Brother     No Known Problems Sister     No Known Problems Sister     No Known Problems Sister     No Known Problems Sister     No Known Problems Brother      Social History     Socioeconomic History    Marital status: /Civil Union     Spouse name: Not on file    Number of children: Not on file    Years of education: Not on file    Highest education level: Not on file   Occupational History    Not on file   Social Needs    Financial resource strain: Not on file    Food insecurity:     Worry: Not on file     Inability: Not on file    Transportation needs:     Medical: Not on file     Non-medical: Not on file   Tobacco Use    Smoking status: Never Smoker    Smokeless tobacco: Never Used   Substance and Sexual Activity    Alcohol use: Yes     Frequency: 2-4 times a month     Comment: occasional     Drug use: No    Sexual activity: Not on file   Lifestyle    Physical activity:     Days per week: Not on file     Minutes per session: Not on file    Stress: Not on file   Relationships    Social connections:     Talks on phone: Not on file     Gets together: Not on file Attends Taoism service: Not on file     Active member of club or organization: Not on file     Attends meetings of clubs or organizations: Not on file     Relationship status: Not on file    Intimate partner violence:     Fear of current or ex partner: Not on file     Emotionally abused: Not on file     Physically abused: Not on file     Forced sexual activity: Not on file   Other Topics Concern    Not on file   Social History Narrative    Dog      Vitals:    01/21/20 1645   BP: 116/72   Pulse: 75   Temp: 98 2 °F (36 8 °C)   SpO2: 97%   Weight: 83 5 kg (184 lb)   Height: 5' 8" (1 727 m)     Results for orders placed or performed during the hospital encounter of 11/20/19   Tissue Exam   Result Value Ref Range    Case Report       Surgical Pathology Report                         Case: [id number]                                   Authorizing Provider:  Shelby Ramirez MD          Collected:           11/20/2019 0409              Ordering Location:     [facility]   Received:            11/20/2019 [address] [place] [address]                                                                       Pathologist:           Toño Agrawal MD                                                              Specimen:    Esophagus, bx distal esophagus/barrets                                                     Final Diagnosis       A  Esophagus, bx distal esophagus/barrets biopsy:    -Gastroesophageal junction mucosa with cardiac type glands present in-between squamous mucosa (esophageal columnar metaplasia)    -Moderate chronic inflammation with reactive epithelial changes   -Focal pancreatic heterotropia seen  -No evidence of Goblet cell metaplasia seen   -No evidence of glandular dysplasia or malignancy          Note       Interpretation performed at [place] [id number]      Additional Information       All controls performed with the immunohistochemical stains reported above reacted appropriately  These tests were developed and their performance characteristics determined by [facility] or [employer]  They may not be cleared or approved by the [place]  Food and Drug Administration  The FDA has determined that such clearance or approval is not necessary  These tests are used for clinical purposes  They should not be regarded as investigational or for research  This laboratory has been approved by [place] [id number], designated as a high-complexity laboratory and is qualified to perform these tests  Gross Description       A  The specimen is received in formalin, labeled with the patient's name and hospital number, and is designated "distal esophageal biopsy, Belcher's  The specimen consists of 5 tan-pink, translucent, focally friable soft tissue fragments ranging from 0 2-0 5 cm in greatest dimension  The specimen is entirely submitted in a screened cassette  Note: The estimated total formalin fixation time based upon information provided by the submitting clinician and the standard processing schedule is under 72 hours  [email or web address]       Weight (last 2 days)     Date/Time   Weight    01/21/20 1645   83 5 (184)            Body mass index is 27 98 kg/m²  BP      Temp      Pulse     Resp      SpO2        Vitals:    01/21/20 1645   Weight: 83 5 kg (184 lb)     Vitals:    01/21/20 1645   Weight: 83 5 kg (184 lb)       /72   Pulse 75   Temp 98 2 °F (36 8 °C)   Ht 5' 8" (1 727 m)   Wt 83 5 kg (184 lb)   SpO2 97%   BMI 27 98 kg/m²          Physical Exam   Constitutional: He appears well-developed and well-nourished  No distress  HENT:   Head: Normocephalic and atraumatic  Right Ear: External ear normal    Left Ear: External ear normal    Mouth/Throat: Oropharynx is clear and moist    Eyes: Pupils are equal, round, and reactive to light  Conjunctivae are normal  Right eye exhibits no discharge  Left eye exhibits no discharge  No scleral icterus  Neck: Neck supple  Cardiovascular: Normal rate, regular rhythm and normal heart sounds  Exam reveals no gallop and no friction rub  No murmur heard  Pulmonary/Chest: No respiratory distress  He has no wheezes  He has no rales  Abdominal: Soft  Bowel sounds are normal  He exhibits no distension and no mass  There is no tenderness  There is no rebound and no guarding  Musculoskeletal: He exhibits no edema or deformity  Lymphadenopathy:     He has no cervical adenopathy  Neurological: He is alert  Skin: He is not diaphoretic  Psychiatric: He has a normal mood and affect

## 2020-01-22 NOTE — ASSESSMENT & PLAN NOTE
Clinically improved, review the EGD recommend ulcer free diet GI did recommend over-the-counter Pepcid p r n

## 2020-07-20 ENCOUNTER — OFFICE VISIT (OUTPATIENT)
Dept: INTERNAL MEDICINE CLINIC | Facility: CLINIC | Age: 65
End: 2020-07-20
Payer: COMMERCIAL

## 2020-07-20 VITALS
DIASTOLIC BLOOD PRESSURE: 72 MMHG | HEIGHT: 68 IN | SYSTOLIC BLOOD PRESSURE: 136 MMHG | OXYGEN SATURATION: 97 % | WEIGHT: 181.6 LBS | TEMPERATURE: 98.6 F | HEART RATE: 84 BPM | BODY MASS INDEX: 27.52 KG/M2

## 2020-07-20 DIAGNOSIS — Z00.00 HEALTHCARE MAINTENANCE: ICD-10-CM

## 2020-07-20 DIAGNOSIS — E66.3 OVERWEIGHT WITH BODY MASS INDEX (BMI) OF 27 TO 27.9 IN ADULT: ICD-10-CM

## 2020-07-20 DIAGNOSIS — K21.9 GASTROESOPHAGEAL REFLUX DISEASE WITHOUT ESOPHAGITIS: ICD-10-CM

## 2020-07-20 DIAGNOSIS — E78.5 HYPERLIPIDEMIA, UNSPECIFIED HYPERLIPIDEMIA TYPE: ICD-10-CM

## 2020-07-20 DIAGNOSIS — R73.03 PREDIABETES: Primary | ICD-10-CM

## 2020-07-20 PROCEDURE — 99214 OFFICE O/P EST MOD 30 MIN: CPT | Performed by: INTERNAL MEDICINE

## 2020-07-20 PROCEDURE — 3008F BODY MASS INDEX DOCD: CPT | Performed by: INTERNAL MEDICINE

## 2020-07-20 PROCEDURE — 1036F TOBACCO NON-USER: CPT | Performed by: INTERNAL MEDICINE

## 2020-07-20 RX ORDER — ROSUVASTATIN CALCIUM 5 MG/1
5 TABLET, COATED ORAL DAILY
Qty: 90 TABLET | Refills: 1 | Status: SHIPPED | OUTPATIENT
Start: 2020-07-20 | End: 2022-04-06

## 2020-07-20 NOTE — PROGRESS NOTES
Assessment/Plan:    Gastroesophageal reflux disease without esophagitis  Clinically stable  Sofie Dan free diet    Prediabetes  Recommend reducing carbohydrates and sweets and routine exercise    Overweight with body mass index (BMI) of 27 to 27 9 in adult  I have counselled the pt to follow a healthy and balanced diet ,and recommend routine exercise  Hyperlipidemia  Hyperlipidemia controlled continue with current medical regiment recommend a low-cholesterol diet and recommend routine exercise we will continue to monitor the progress  Continue Crestor 5 mg once daily    Healthcare maintenance  Will check varicella IgG antibody if he has had chickenpox he might be a suitable candidate for shingles vaccine         Problem List Items Addressed This Visit        Digestive    Gastroesophageal reflux disease without esophagitis     Clinically stable  Sofie Dan free diet            Other    Hyperlipidemia     Hyperlipidemia controlled continue with current medical regiment recommend a low-cholesterol diet and recommend routine exercise we will continue to monitor the progress  Continue Crestor 5 mg once daily         Relevant Medications    rosuvastatin (CRESTOR) 5 mg tablet    Prediabetes - Primary     Recommend reducing carbohydrates and sweets and routine exercise         Overweight with body mass index (BMI) of 27 to 27 9 in adult     I have counselled the pt to follow a healthy and balanced diet ,and recommend routine exercise  Healthcare maintenance     Will check varicella IgG antibody if he has had chickenpox he might be a suitable candidate for shingles vaccine         Relevant Orders    Varicella zoster antibody, IgG        RTO in 6 months call if any problems    Subjective:      Patient ID: Romelia Franco is a 60 y o  male  HPI 63-year old male coming in for a follow up visit regarding hyperlipidemia, prediabetes, GERD, overweight;  The patient reports me compliant taking medications without untoward side effects the  The patient is here to review his medical condition, update me on the medical condition and the patient reports me no hospitalizations and no ER visits  He reports me he is trying to follow healthy diet remains active  He is uncertain if he has ever had chickenpox  He is tolerating the Crestor very well and trying to follow a low-cholesterol diet  never started the ssri sc got better after 3 months    The following portions of the patient's history were reviewed and updated as appropriate: allergies, current medications, past family history, past medical history, past social history, past surgical history and problem list     Review of Systems   Constitutional: Negative for activity change, appetite change and unexpected weight change  HENT: Negative for congestion  Eyes: Negative for visual disturbance  Respiratory: Negative for cough and shortness of breath  Cardiovascular: Negative for chest pain  Gastrointestinal: Negative for abdominal pain, diarrhea, nausea and vomiting  Neurological: Negative for dizziness, light-headedness and headaches  Psychiatric/Behavioral: The patient is not nervous/anxious  Objective:    No follow-ups on file  No results found  No Known Allergies    Past Medical History:   Diagnosis Date    Benign neoplasm of colon     Colon polyp     GERD (gastroesophageal reflux disease)     mild diet controlled    Hyperlipidemia      Past Surgical History:   Procedure Laterality Date    INGUINAL HERNIA REPAIR Bilateral     MOHS SURGERY  2000    nose    UT COLONOSCOPY FLX DX W/COLLJ SPEC WHEN PFRMD N/A 12/18/2018    Procedure: COLONOSCOPY;  Surgeon: Jeanna Wayne MD;  [facility];   Service: Gastroenterology    TONSILLECTOMY       Current Outpatient Medications on File Prior to Visit   Medication Sig Dispense Refill    [DISCONTINUED] rosuvastatin (CRESTOR) 5 mg tablet Take 1 tablet (5 mg total) by mouth daily 90 tablet 1 No current facility-administered medications on file prior to visit        Family History   Problem Relation Age of Onset    Hypertension Family     Varicose Veins Mother     Parkinsonism Father     No Known Problems Sister     No Known Problems Brother     No Known Problems Sister     No Known Problems Sister     No Known Problems Sister     No Known Problems Sister     No Known Problems Brother      Social History     Socioeconomic History    Marital status: /Civil Union     Spouse name: Not on file    Number of children: Not on file    Years of education: Not on file    Highest education level: Not on file   Occupational History    Not on file   Social Needs    Financial resource strain: Not on file    Food insecurity:     Worry: Not on file     Inability: Not on file    Transportation needs:     Medical: Not on file     Non-medical: Not on file   Tobacco Use    Smoking status: Never Smoker    Smokeless tobacco: Never Used   Substance and Sexual Activity    Alcohol use: Yes     Frequency: 2-4 times a month     Comment: occasional     Drug use: No    Sexual activity: Not on file   Lifestyle    Physical activity:     Days per week: Not on file     Minutes per session: Not on file    Stress: Not on file   Relationships    Social connections:     Talks on phone: Not on file     Gets together: Not on file     Attends Orthodox service: Not on file     Active member of club or organization: Not on file     Attends meetings of clubs or organizations: Not on file     Relationship status: Not on file    Intimate partner violence:     Fear of current or ex partner: Not on file     Emotionally abused: Not on file     Physically abused: Not on file     Forced sexual activity: Not on file   Other Topics Concern    Not on file   Social History Narrative    Dog      Vitals:    07/20/20 1633   BP: 136/72   Pulse: 84   Temp: 98 6 °F (37 °C)   SpO2: 97%   Weight: 82 4 kg (181 lb 9 6 oz) Height: 5' 8" (1 727 m)     Results for orders placed or performed during the hospital encounter of 11/20/19   Tissue Exam   Result Value Ref Range    Case Report       Surgical Pathology Report                         Case: [id number]                                   Authorizing Provider:  Gary Page MD          Collected:           11/20/2019 2058              Ordering Location:     [facility]   Received:            11/20/2019 [address] [facility]                                                                       Pathologist:           Talha Ochoa MD                                                              Specimen:    Esophagus, bx distal esophagus/barrets                                                     Final Diagnosis       A  Esophagus, bx distal esophagus/barrets biopsy:    -Gastroesophageal junction mucosa with cardiac type glands present in-between squamous mucosa (esophageal columnar metaplasia)  -Moderate chronic inflammation with reactive epithelial changes   -Focal pancreatic heterotropia seen  -No evidence of Goblet cell metaplasia seen   -No evidence of glandular dysplasia or malignancy          Note       Interpretation performed at [facility], [address] [place] [address]      Additional Information       All controls performed with the immunohistochemical stains reported above reacted appropriately  These tests were developed and their performance characteristics determined by [facility] or [facility]  They may not be cleared or approved by the [place]  Food and Drug Administration  The FDA has determined that such clearance or approval is not necessary  These tests are used for clinical purposes  They should not be regarded as investigational or for research  This laboratory has been approved by CLIA 88, designated as a high-complexity laboratory and is qualified to perform these tests  Gross Description       A  The specimen is received in formalin, labeled with the patient's name and hospital number, and is designated "distal esophageal biopsy, Belcher's  The specimen consists of 5 tan-pink, translucent, focally friable soft tissue fragments ranging from 0 2-0 5 cm in greatest dimension  The specimen is entirely submitted in a screened cassette  Note: The estimated total formalin fixation time based upon information provided by the submitting clinician and the standard processing schedule is under 72 hours  AGerczedenilson       Weight (last 2 days)     Date/Time   Weight    07/20/20 1633   82 4 (181 6)            Body mass index is 27 61 kg/m²  BP      Temp      Pulse     Resp      SpO2        Vitals:    07/20/20 1633   Weight: 82 4 kg (181 lb 9 6 oz)     Vitals:    07/20/20 1633   Weight: 82 4 kg (181 lb 9 6 oz)       /72   Pulse 84   Temp 98 6 °F (37 °C)   Ht 5' 8" (1 727 m)   Wt 82 4 kg (181 lb 9 6 oz)   SpO2 97%   BMI 27 61 kg/m²          Physical Exam   Constitutional: He appears well-developed and well-nourished  No distress  HENT:   Head: Normocephalic and atraumatic  Right Ear: External ear normal    Left Ear: External ear normal    Eyes: Pupils are equal, round, and reactive to light  Conjunctivae are normal  Right eye exhibits no discharge  Left eye exhibits no discharge  No scleral icterus  Neck: Neck supple  Cardiovascular: Normal rate, regular rhythm and normal heart sounds  Exam reveals no gallop and no friction rub  No murmur heard  Pulmonary/Chest: No respiratory distress  He has no wheezes  He has no rales  Abdominal: Soft  Bowel sounds are normal  He exhibits no distension and no mass  There is no tenderness  There is no rebound and no guarding  Musculoskeletal: He exhibits no edema or deformity  Lymphadenopathy:     He has no cervical adenopathy  Neurological: He is alert  Skin: He is not diaphoretic     Psychiatric: He has a normal mood and affect  His mood appears not anxious  He does not exhibit a depressed mood  He expresses no suicidal ideation

## 2020-07-20 NOTE — ASSESSMENT & PLAN NOTE
Will check varicella IgG antibody if he has had chickenpox he might be a suitable candidate for shingles vaccine

## 2020-07-31 ENCOUNTER — TRANSCRIBE ORDERS (OUTPATIENT)
Dept: ADMINISTRATIVE | Facility: HOSPITAL | Age: 65
End: 2020-07-31

## 2020-07-31 ENCOUNTER — APPOINTMENT (OUTPATIENT)
Dept: LAB | Facility: HOSPITAL | Age: 65
End: 2020-07-31
Payer: COMMERCIAL

## 2020-07-31 DIAGNOSIS — E78.5 HYPERLIPIDEMIA, UNSPECIFIED HYPERLIPIDEMIA TYPE: ICD-10-CM

## 2020-07-31 DIAGNOSIS — Z00.00 HEALTHCARE MAINTENANCE: ICD-10-CM

## 2020-07-31 DIAGNOSIS — R73.03 PREDIABETES: ICD-10-CM

## 2020-07-31 DIAGNOSIS — R97.20 ELEVATED PSA: ICD-10-CM

## 2020-07-31 LAB
ALBUMIN SERPL BCP-MCNC: 3.8 G/DL (ref 3.5–5)
ALP SERPL-CCNC: 57 U/L (ref 46–116)
ALT SERPL W P-5'-P-CCNC: 38 U/L (ref 12–78)
ANION GAP SERPL CALCULATED.3IONS-SCNC: 8 MMOL/L (ref 4–13)
AST SERPL W P-5'-P-CCNC: 23 U/L (ref 5–45)
BILIRUB SERPL-MCNC: 0.5 MG/DL (ref 0.2–1)
BUN SERPL-MCNC: 19 MG/DL (ref 5–25)
CALCIUM SERPL-MCNC: 8.6 MG/DL (ref 8.3–10.1)
CHLORIDE SERPL-SCNC: 104 MMOL/L (ref 100–108)
CHOLEST SERPL-MCNC: 191 MG/DL (ref 50–200)
CO2 SERPL-SCNC: 25 MMOL/L (ref 21–32)
CREAT SERPL-MCNC: 1.22 MG/DL (ref 0.6–1.3)
EST. AVERAGE GLUCOSE BLD GHB EST-MCNC: 128 MG/DL
GFR SERPL CREATININE-BSD FRML MDRD: 62 ML/MIN/1.73SQ M
GLUCOSE P FAST SERPL-MCNC: 116 MG/DL (ref 65–99)
HBA1C MFR BLD: 6.1 %
HDLC SERPL-MCNC: 37 MG/DL
LDLC SERPL CALC-MCNC: 113 MG/DL (ref 0–100)
POTASSIUM SERPL-SCNC: 4 MMOL/L (ref 3.5–5.3)
PROT SERPL-MCNC: 7.3 G/DL (ref 6.4–8.2)
SODIUM SERPL-SCNC: 137 MMOL/L (ref 136–145)
TRIGL SERPL-MCNC: 205 MG/DL

## 2020-07-31 PROCEDURE — 80053 COMPREHEN METABOLIC PANEL: CPT

## 2020-07-31 PROCEDURE — 86787 VARICELLA-ZOSTER ANTIBODY: CPT

## 2020-07-31 PROCEDURE — 80061 LIPID PANEL: CPT

## 2020-07-31 PROCEDURE — 83036 HEMOGLOBIN GLYCOSYLATED A1C: CPT

## 2020-07-31 PROCEDURE — 36415 COLL VENOUS BLD VENIPUNCTURE: CPT

## 2020-08-04 LAB — VZV IGG SER IA-ACNC: NORMAL

## 2020-10-21 ENCOUNTER — OFFICE VISIT (OUTPATIENT)
Dept: INTERNAL MEDICINE CLINIC | Facility: CLINIC | Age: 65
End: 2020-10-21
Payer: COMMERCIAL

## 2020-10-21 DIAGNOSIS — H93.8X3 CONGESTION OF BOTH EARS: Primary | ICD-10-CM

## 2020-10-21 PROCEDURE — 99213 OFFICE O/P EST LOW 20 MIN: CPT | Performed by: NURSE PRACTITIONER

## 2020-10-21 RX ORDER — FLUTICASONE PROPIONATE 50 MCG
1 SPRAY, SUSPENSION (ML) NASAL DAILY
Qty: 1 BOTTLE | Refills: 0 | Status: SHIPPED | OUTPATIENT
Start: 2020-10-21 | End: 2022-03-29

## 2020-10-21 RX ORDER — PREDNISONE 20 MG/1
20 TABLET ORAL DAILY
Qty: 7 TABLET | Refills: 0 | Status: SHIPPED | OUTPATIENT
Start: 2020-10-21 | End: 2022-03-29

## 2020-10-23 VITALS — DIASTOLIC BLOOD PRESSURE: 80 MMHG | HEART RATE: 80 BPM | TEMPERATURE: 98 F | SYSTOLIC BLOOD PRESSURE: 120 MMHG

## 2020-10-23 PROBLEM — H93.8X3 CONGESTION OF BOTH EARS: Status: ACTIVE | Noted: 2020-10-23

## 2020-12-08 ENCOUNTER — HOSPITAL ENCOUNTER (OUTPATIENT)
Dept: RADIOLOGY | Facility: HOSPITAL | Age: 65
Discharge: HOME | End: 2020-12-08
Attending: OTOLARYNGOLOGY
Payer: COMMERCIAL

## 2020-12-08 DIAGNOSIS — H90.42 SENSORINEURAL HEARING LOSS (SNHL) OF LEFT EAR WITH UNRESTRICTED HEARING OF RIGHT EAR: ICD-10-CM

## 2020-12-08 DIAGNOSIS — H83.3X2 NOISE-INDUCED HEARING LOSS OF LEFT EAR WITH UNRESTRICTED HEARING OF RIGHT EAR: ICD-10-CM

## 2020-12-08 PROCEDURE — 70553 MRI BRAIN STEM W/O & W/DYE: CPT

## 2020-12-08 PROCEDURE — A9585 GADOBUTROL INJECTION: HCPCS | Performed by: OTOLARYNGOLOGY

## 2020-12-08 PROCEDURE — G1004 CDSM NDSC: HCPCS

## 2020-12-08 RX ADMIN — GADOBUTROL 8 ML: 604.72 INJECTION INTRAVENOUS at 13:54

## 2020-12-29 ENCOUNTER — IMMUNIZATIONS (OUTPATIENT)
Dept: FAMILY MEDICINE CLINIC | Facility: HOSPITAL | Age: 65
End: 2020-12-29
Payer: COMMERCIAL

## 2020-12-29 DIAGNOSIS — Z23 ENCOUNTER FOR IMMUNIZATION: ICD-10-CM

## 2020-12-29 PROCEDURE — 0011A SARS-COV-2 / COVID-19 MRNA VACCINE (MODERNA) 100 MCG: CPT

## 2020-12-29 PROCEDURE — 91301 SARS-COV-2 / COVID-19 MRNA VACCINE (MODERNA) 100 MCG: CPT

## 2021-01-11 ENCOUNTER — TELEPHONE (OUTPATIENT)
Dept: NEUROSURGERY | Facility: CLINIC | Age: 66
End: 2021-01-11

## 2021-01-11 NOTE — TELEPHONE ENCOUNTER
Received call with Stefanie Steve reporting he is not able to get in with Endo until 2/24 - assisted to reschedule with visit with Dr Charleen Villafana to 3/1 after this appt  He said he has planned appt with Dr Ale Thorne for the 24th as well

## 2021-01-25 ENCOUNTER — IMMUNIZATIONS (OUTPATIENT)
Dept: FAMILY MEDICINE CLINIC | Facility: HOSPITAL | Age: 66
End: 2021-01-25

## 2021-01-25 DIAGNOSIS — Z23 ENCOUNTER FOR IMMUNIZATION: Primary | ICD-10-CM

## 2021-01-25 PROCEDURE — 91301 SARS-COV-2 / COVID-19 MRNA VACCINE (MODERNA) 100 MCG: CPT

## 2021-01-25 PROCEDURE — 0012A SARS-COV-2 / COVID-19 MRNA VACCINE (MODERNA) 100 MCG: CPT

## 2021-02-10 ENCOUNTER — TRANSCRIBE ORDERS (OUTPATIENT)
Dept: ADMINISTRATIVE | Facility: HOSPITAL | Age: 66
End: 2021-02-10

## 2021-02-10 ENCOUNTER — TELEPHONE (OUTPATIENT)
Dept: UROLOGY | Facility: MEDICAL CENTER | Age: 66
End: 2021-02-10

## 2021-02-10 ENCOUNTER — LAB (OUTPATIENT)
Dept: LAB | Facility: HOSPITAL | Age: 66
End: 2021-02-10
Payer: COMMERCIAL

## 2021-02-10 DIAGNOSIS — R97.20 ELEVATED PROSTATE SPECIFIC ANTIGEN (PSA): ICD-10-CM

## 2021-02-10 DIAGNOSIS — R97.20 ELEVATED PROSTATE SPECIFIC ANTIGEN (PSA): Primary | ICD-10-CM

## 2021-02-10 PROCEDURE — 36415 COLL VENOUS BLD VENIPUNCTURE: CPT

## 2021-02-10 PROCEDURE — 84154 ASSAY OF PSA FREE: CPT

## 2021-02-10 PROCEDURE — 84153 ASSAY OF PSA TOTAL: CPT

## 2021-02-14 LAB
PSA FREE MFR SERPL: 21.1 %
PSA FREE SERPL-MCNC: 0.38 NG/ML
PSA SERPL-MCNC: 1.8 NG/ML (ref 0–4)

## 2021-02-15 ENCOUNTER — TELEPHONE (OUTPATIENT)
Dept: NEUROSURGERY | Facility: CLINIC | Age: 66
End: 2021-02-15

## 2021-02-15 NOTE — TELEPHONE ENCOUNTER
S/W Art Ly of [employer] [facility] to request FVF and OCT, she will email them to me  If for any reason we don't receive them in color she stated to call her back  Per Dr Roman Montes: The ophtho note that just came in, is just the office note, but not the actual formal visual fields  Can you make sure we get the actual fields?

## 2021-02-23 ENCOUNTER — OFFICE VISIT (OUTPATIENT)
Dept: UROLOGY | Facility: CLINIC | Age: 66
End: 2021-02-23
Payer: COMMERCIAL

## 2021-02-23 VITALS
HEIGHT: 68 IN | WEIGHT: 185 LBS | SYSTOLIC BLOOD PRESSURE: 130 MMHG | BODY MASS INDEX: 28.04 KG/M2 | DIASTOLIC BLOOD PRESSURE: 90 MMHG

## 2021-02-23 DIAGNOSIS — N40.0 BENIGN PROSTATIC HYPERPLASIA WITHOUT LOWER URINARY TRACT SYMPTOMS: Primary | ICD-10-CM

## 2021-02-23 PROCEDURE — 99213 OFFICE O/P EST LOW 20 MIN: CPT | Performed by: PHYSICIAN ASSISTANT

## 2021-02-23 NOTE — PROGRESS NOTES
UROLOGY PROGRESS NOTE   Patient Identifiers: Bryan Hernandez (MRN [id number])  Date of Service: 2/23/2021    Subjective:    81-year-old man with history of elevated PSA  He has no significant outlet complaints  His current PSA is 1 8  His high PSA was 3 9  He has not had a prostate biopsy  He is being evaluated for a pituitary macroadenoma by neuro surgery   Which originally manifested by ringing in his ears  Reason for visit:  Elevated PSA follow-up      Objective:     VITALS:    Vitals:    02/23/21 1418   BP: 130/90           LABS:  No results found for: HGB, HCT, WBC, PLT]    Lab Results   Component Value Date    K 4 0 07/31/2020     07/31/2020    CO2 25 07/31/2020    BUN 19 07/31/2020    CREATININE 1 22 07/31/2020    CALCIUM 8 6 07/31/2020   ]        INPATIENT MEDS:    Current Outpatient Medications:     fluticasone (FLONASE) 50 mcg/act nasal spray, 1 spray into each nostril daily, Disp: 1 Bottle, Rfl: 0    rosuvastatin (CRESTOR) 5 mg tablet, Take 1 tablet (5 mg total) by mouth daily, Disp: 90 tablet, Rfl: 1    predniSONE 10 mg tablet, Take 5 tablets (50 mg total) by mouth daily 5 tabls PO in morning  days 1-3, 4 tabls days 4-6, 3 tabls days 7-9, 2 tabls days 10-12, 1 tabl in morning  days 13-15 (Patient not taking: Reported on 2/23/2021), Disp: 45 tablet, Rfl: 0    predniSONE 20 mg tablet, Take 1 tablet (20 mg total) by mouth daily (Patient not taking: Reported on 2/23/2021), Disp: 7 tablet, Rfl: 0      Physical Exam:   /90 (BP Location: Left arm, Patient Position: Sitting, Cuff Size: Adult)   Ht 5' 8" (1 727 m)   Wt 83 9 kg (185 lb)   BMI 28 13 kg/m²   GEN: no acute distress    RESP: breathing comfortably with no accessory muscle use    ABD: soft, non-tender, non-distended   INCISION:    EXT: no significant peripheral edema   (Male): Penis uncircumcised, phallus normal, meatus patent  Testicles descended into scrotum bilaterally without masses nor tenderness    No inguinal hernias bilaterally  DEEJAY: Prostate is enlarged at 35 grams  Asymmetric right greater than left  The prostate is not boggy  The prostate is not tender  No nodules noted      RADIOLOGY:     None     Assessment:    1   History elevated PSA     Plan:   - follow-up in 1 year with PSA prior to visit  -  -  -

## 2021-02-24 ENCOUNTER — CONSULT (OUTPATIENT)
Dept: ENDOCRINOLOGY | Facility: CLINIC | Age: 66
End: 2021-02-24
Payer: COMMERCIAL

## 2021-02-24 VITALS
BODY MASS INDEX: 28.51 KG/M2 | SYSTOLIC BLOOD PRESSURE: 128 MMHG | WEIGHT: 188.13 LBS | HEIGHT: 68 IN | DIASTOLIC BLOOD PRESSURE: 80 MMHG | HEART RATE: 92 BPM

## 2021-02-24 DIAGNOSIS — D35.2 PITUITARY MACROADENOMA (HCC): ICD-10-CM

## 2021-02-24 PROCEDURE — 99244 OFF/OP CNSLTJ NEW/EST MOD 40: CPT | Performed by: INTERNAL MEDICINE

## 2021-02-24 NOTE — PATIENT INSTRUCTIONS
Please get your labs done fasting around 8am    Once we get the results of this, then I will have you do the 2nd test for cortisol with the steroid pill

## 2021-02-24 NOTE — PROGRESS NOTES
Chief Complaint   Patient presents with    Pituitary Adenoma      Referring Provider  Torres Ponce [facility]  [address] [facility] [id number] [address] 3     History of Present Illness:   Katlyn Mullen is a 60 y o  male with hypothyroidism seen in consultation at the request of Dr Ranjith Chan  He had a pituitary adenoma incidentrally noted on MRI for hearing issues which incidentally identified a 1 5cm pituitary adenoma  There was rise superiorly towards optic chiasm  He denies headache or loss of peripheral vision, but was referred to Dr Anderson Florentino for visual field testing and completed this (see media)  There is a possible left upper field discrepancy, but not a field cut  Consult with Dr Channing Sloan is scheduled for 3/1/2021  He had a course of steroids in the fall, but none since and this was for the vestibular issues  Overall, he is good health and works in facilities at [place]  He has a hx of controlled HTN and pre-diabetes, which have not worsened  He denies changes in his ring, shoe size, or face  He also denies n/v/d/c, easy bruising or skin teatring, new stretch marks on the skin, or proximal mm weakness  He denies galactorrhea, breast enlargement, or breast tenderness  He appears clinincally euthyroid, and also denies dizziness or lightheadedness      Patient Active Problem List   Diagnosis    Hyperlipidemia    Prediabetes    Overweight with body mass index (BMI) of 27 to 27 9 in adult    GERD (gastroesophageal reflux disease)    History of colon polyps    Screening for malignant neoplasm of colon    Asymptomatic varicose veins of both lower extremities    Chronic left shoulder pain    Plantar fasciitis of right foot    Increased prostate specific antigen (PSA) velocity    Need for influenza vaccination    Gastroesophageal reflux disease without esophagitis    Healthcare maintenance    Congestion of both ears      Past Medical History:   Diagnosis Date    Benign neoplasm of colon     Colon polyp     GERD (gastroesophageal reflux disease)     mild diet controlled    Hyperlipidemia       Past Surgical History:   Procedure Laterality Date    INGUINAL HERNIA REPAIR Bilateral     MOHS SURGERY  2000    nose    [place] COLONOSCOPY FLX DX W/COLLJ SPEC WHEN PFRMD N/A 12/18/2018    Procedure: COLONOSCOPY;  Surgeon: Shellie Mandujano MD;  [facility]; Service: Gastroenterology    TONSILLECTOMY        Family History   Problem Relation Age of Onset    Varicose Veins Mother     Hypertension Mother     Parkinsonism Father     Hypertension Father     No Known Problems Sister     No Known Problems Brother     No Known Problems Sister     No Known Problems Sister     No Known Problems Sister     No Known Problems Sister     No Known Problems Brother      Social History     Tobacco Use    Smoking status: Never Smoker    Smokeless tobacco: Never Used   Substance Use Topics    Alcohol use: Yes     Frequency: 2-4 times a month     Comment: occasional      No Known Allergies      Current Outpatient Medications:     rosuvastatin (CRESTOR) 5 mg tablet, Take 1 tablet (5 mg total) by mouth daily, Disp: 90 tablet, Rfl: 1    fluticasone (FLONASE) 50 mcg/act nasal spray, 1 spray into each nostril daily (Patient not taking: Reported on 2/24/2021), Disp: 1 Bottle, Rfl: 0    predniSONE 10 mg tablet, Take 5 tablets (50 mg total) by mouth daily 5 tabls PO in morning  days 1-3, 4 tabls days 4-6, 3 tabls days 7-9, 2 tabls days 10-12, 1 tabl in morning  days 13-15 (Patient not taking: Reported on 2/23/2021), Disp: 45 tablet, Rfl: 0    predniSONE 20 mg tablet, Take 1 tablet (20 mg total) by mouth daily (Patient not taking: Reported on 2/23/2021), Disp: 7 tablet, Rfl: 0  Review of Systems   Constitutional: Positive for unexpected weight change  Negative for fatigue  HENT: Negative for facial swelling, hearing loss, trouble swallowing and voice change      Eyes: Negative for visual disturbance  Respiratory: Negative for cough, choking and shortness of breath  Cardiovascular: Negative for chest pain and palpitations  Gastrointestinal: Negative for constipation, diarrhea, rectal pain and vomiting  Endocrine: Negative for polydipsia and polyuria  Skin: Negative for rash and wound  Neurological: Negative for dizziness and tremors  Hematological: Does not bruise/bleed easily  Psychiatric/Behavioral: The patient is not nervous/anxious  All other systems reviewed and are negative  see also HPI    Physical Exam:  Body mass index is 28 6 kg/m²    /80 (BP Location: Left arm, Patient Position: Sitting, Cuff Size: Standard)   Pulse 92   Ht 5' 8" (1 727 m)   Wt 85 3 kg (188 lb 2 oz)   BMI 28 60 kg/m²    Wt Readings from Last 3 Encounters:   02/24/21 85 3 kg (188 lb 2 oz)   02/23/21 83 9 kg (185 lb)   12/16/20 83 9 kg (185 lb)       GEN: NAD  E/n/m nl facies, hearing intact bilat, tongue midline, lips nl  Eyes: no stare or proptosis, EOMI  Neck: trachea midline, thyroid NT to palpation, nl in size, no nodules or neck masses noted, no cervical LAD  CV; heart reg rate s1s2 nl, no m/r/g appreciated  Resp: CTAB, good effort  Ab+BS  Neuro: no tremor  MS: no c/c in digits, moves all 4 ext, nl muscle bulk, gait nl  Skin: warm and dry, no palmar erythema; no striae on abdomen  Psych: nl mood and affect, no gross lapses in memory    DATA:  Labs:   Lab Results   Component Value Date    HGBA1C 6 1 (H) 07/31/2020     Lab Results   Component Value Date    SODIUM 137 07/31/2020    K 4 0 07/31/2020     07/31/2020    CO2 25 07/31/2020    AGAP 8 07/31/2020    BUN 19 07/31/2020    CREATININE 1 22 07/31/2020    GLUF 116 (H) 07/31/2020    CALCIUM 8 6 07/31/2020    AST 23 07/31/2020    ALT 38 07/31/2020    ALKPHOS 57 07/31/2020    TP 7 3 07/31/2020    TBILI 0 50 07/31/2020    EGFR 62 07/31/2020     Lab Results   Component Value Date    CHOLESTEROL 191 07/31/2020    CHOLESTEROL 201 (H) 09/24/2019    CHOLESTEROL 183 03/12/2019     Lab Results   Component Value Date    HDL 37 (L) 07/31/2020    HDL 38 (L) 09/24/2019    HDL 35 (L) 03/12/2019     Lab Results   Component Value Date    TRIG 205 (H) 07/31/2020    TRIG 178 (H) 09/24/2019    TRIG 222 (H) 03/12/2019     Lab Results   Component Value Date    [place] 211 04/27/2018       Radiology    MRI   INDICATION: H90 42: Sensorineural hearing loss, unilateral, left ear, with unrestricted hearing on the contralateral side  H83 3X2: Noise effects on left inner ear      COMPARISON:  None      TECHNIQUE:  Brain:   Sagittal T1, axial T2, axial [place], axial T1, axial FLAIR, axial diffusion imaging  Axial T1 postcontrast   Axial BRAVO post contrast   IAC'S:  Coronal FIESTA, coronal T1 postcontrast, axial T1 postcontrast with fat suppression  Targeted images of the IAC'S were performed requiring additional time at acquisition and interpretation of approximately 25%     IV Contrast:  8 mL of Gadobutrol injection (SINGLE-DOSE)      IMAGE QUALITY:   Diagnostic      FINDINGS:     BRAIN PARENCHYMA:  There is no discrete mass, mass effect or midline shift  A few small FLAIR hyperintensities are seen within the cerebral hemispheres suggestive of mild chronic microangiopathic change  Brainstem and cerebellum demonstrate normal   signal   No acute ischemia  Normal diffusion imaging  Postcontrast imaging of the brain parenchyma is unremarkable      IAC'S:  Dedicated imaging of the internal auditory canals demonstrates no CP angle mass or abnormal enhancement  Asymmetric jugular bulb, dominant on the right, and slightly high riding      VENTRICLES:  Mild asymmetry of the lateral ventricles, consistent with anatomic variation      SELLA AND PITUITARY GLAND:  The sella and the pituitary gland are enlarged  The gland demonstrates slightly heterogeneous signal on T2-weighted imaging with an area of hyperintensity noted in the right paramedian aspect of the gland    The gland itself   demonstrates heterogeneous enhancement and the focus of T2 hyperintensity in the right paramedian aspect of the gland demonstrates increased enhancement compared to the remainder of the gland  The gland measures 1 5 cm in craniocaudad dimension with   slight bulging of the diaphragma sella superiorly  There is no mass effect upon the optic chiasm  The pituitary stalk is displaced towards the right  Normal parasellar structures      ORBITS:  Normal      PARANASAL SINUSES:  Normal      VASCULATURE:  Evaluation of the major intracranial vasculature demonstrates appropriate flow voids      CALVARIUM AND SKULL BASE:  Normal      EXTRACRANIAL SOFT TISSUES:  Normal      IMPRESSION:     Enlarged sella and pituitary gland  The gland measures 1 5 cm in craniocaudad dimension with heterogeneous enhancement including an enhancing nodule within the right paramedian portion of the gland  Findings suggestive of an atypical pituitary   macroadenoma  Neurosurgical consultation recommended      Chronic microangiopathic change within the cerebral hemispheres with a few scattered T2 hyperintensities      Dedicated imaging of the IACs is unremarkable        Impression:  1  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[facility] [address] )           Plan:    Justus Pemberton was seen today for pituitary adenoma  Diagnoses and all orders for this visit:    Pituitary macroadenoma [facility])  -     Ambulatory referral to Endocrinology  -     [address] [place] [address]; Future  -     Cortisol- Lab Collect; Future  -     Insulin-like growth factor 1 (IGF-1) - Lab Collect; Future  -     TSH, 3rd generation Lab Collect; Future  -     T4, free- Lab Collect; Future  -     Follicle stimulating hormone Lab Collect; Future  -     Luteinizing hormone Lab Collect; Future  -     Testosterone, free, total Lab Collect; Future  -     Prolactin Lab Collect; Future      1  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[facility] [address] )    - Ambulatory referral to Endocrinology    1   Macroadenoma, pituitary: I reviewed the role of the pituitary and need to evaluate for pituitary hyper and hypofunction  I ordered labs to assess all axes, but will wait to get the ACTh/Cortisol back before doing the dex suppression test for  Hypercortisolemia  I have a low suspicion for this  I briefly reviewed possible treatments such as medical and surgical management, but more will be discussed after the labs return  He is scheduled to see Dr Melonie Levin and will f/u with Dr Selam Castillo    Discussed with the patient and all questioned fully answered  He will call me if any problems arise          Teresita Sparks MD

## 2021-02-25 ENCOUNTER — APPOINTMENT (OUTPATIENT)
Dept: LAB | Facility: HOSPITAL | Age: 66
End: 2021-02-25
Payer: COMMERCIAL

## 2021-02-25 ENCOUNTER — TRANSCRIBE ORDERS (OUTPATIENT)
Dept: ADMINISTRATIVE | Facility: HOSPITAL | Age: 66
End: 2021-02-25

## 2021-02-25 DIAGNOSIS — D35.2 PITUITARY MACROADENOMA (HCC): ICD-10-CM

## 2021-02-25 DIAGNOSIS — N40.0 BENIGN PROSTATIC HYPERPLASIA WITHOUT LOWER URINARY TRACT SYMPTOMS: ICD-10-CM

## 2021-02-25 LAB
CORTIS SERPL-MCNC: 11.2 UG/DL
FSH SERPL-ACNC: 3.8 MIU/ML (ref 0.7–10.8)
LH SERPL-ACNC: 2.2 MIU/ML (ref 1.2–10.6)
PROLACTIN SERPL-MCNC: 7.8 NG/ML (ref 2.5–17.4)
T4 FREE SERPL-MCNC: 0.75 NG/DL (ref 0.76–1.46)
TSH SERPL DL<=0.05 MIU/L-ACNC: 2.11 UIU/ML (ref 0.36–3.74)

## 2021-02-25 PROCEDURE — 82024 ASSAY OF ACTH: CPT

## 2021-02-25 PROCEDURE — 84305 ASSAY OF SOMATOMEDIN: CPT

## 2021-02-25 PROCEDURE — 84146 ASSAY OF PROLACTIN: CPT

## 2021-02-25 PROCEDURE — 84439 ASSAY OF FREE THYROXINE: CPT

## 2021-02-25 PROCEDURE — 84402 ASSAY OF FREE TESTOSTERONE: CPT

## 2021-02-25 PROCEDURE — 83001 ASSAY OF GONADOTROPIN (FSH): CPT

## 2021-02-25 PROCEDURE — 84443 ASSAY THYROID STIM HORMONE: CPT

## 2021-02-25 PROCEDURE — 82533 TOTAL CORTISOL: CPT

## 2021-02-25 PROCEDURE — 36415 COLL VENOUS BLD VENIPUNCTURE: CPT

## 2021-02-25 PROCEDURE — 84403 ASSAY OF TOTAL TESTOSTERONE: CPT

## 2021-02-25 PROCEDURE — 83002 ASSAY OF GONADOTROPIN (LH): CPT

## 2021-02-26 LAB
ACTH PLAS-MCNC: 27.3 PG/ML (ref 7.2–63.3)
TESTOST FREE SERPL-MCNC: 11.6 PG/ML (ref 6.6–18.1)
TESTOST SERPL-MCNC: 257 NG/DL (ref 264–916)

## 2021-02-27 LAB — IGF-I SERPL-MCNC: 159 NG/ML (ref 64–240)

## 2021-03-01 ENCOUNTER — CONSULT (OUTPATIENT)
Dept: NEUROSURGERY | Facility: CLINIC | Age: 66
End: 2021-03-01
Payer: COMMERCIAL

## 2021-03-01 VITALS
HEIGHT: 68 IN | DIASTOLIC BLOOD PRESSURE: 87 MMHG | WEIGHT: 180 LBS | SYSTOLIC BLOOD PRESSURE: 127 MMHG | BODY MASS INDEX: 27.28 KG/M2 | TEMPERATURE: 97.6 F | HEART RATE: 80 BPM | RESPIRATION RATE: 16 BRPM

## 2021-03-01 DIAGNOSIS — D35.2 PITUITARY MACROADENOMA (HCC): Primary | ICD-10-CM

## 2021-03-01 DIAGNOSIS — H90.42 SENSORINEURAL HEARING LOSS (SNHL) OF LEFT EAR WITH UNRESTRICTED HEARING OF RIGHT EAR: ICD-10-CM

## 2021-03-01 PROBLEM — E23.6 PITUITARY MASS (HCC): Status: ACTIVE | Noted: 2021-03-01

## 2021-03-01 PROCEDURE — 99244 OFF/OP CNSLTJ NEW/EST MOD 40: CPT | Performed by: NEUROLOGICAL SURGERY

## 2021-03-01 NOTE — PROGRESS NOTES
Neurosurgery Office Note  Katlyn Mullen 60 y o  male MRN: [id number]      Assessment/Plan      Diagnoses and all orders for this visit:    Pituitary macroadenoma [facility])  -     Ambulatory referral to Neurosurgery  -     MRI brain pituitary wo and w contrast; Future    Sensorineural hearing loss (SNHL) of left ear with unrestricted hearing of right ear  -     Ambulatory referral to Neurosurgery          Discussion:      76-year-old male who presented to Dr Chaitanya Hayes of ENT  For issues of vibration in his left ear, and some hearing loss in his left ear  He was referred for an MRI scan of the IAC   That study actually found a 1 5 cm enlargement of his pituitary  He was referred to Ophthalmology, saw Dr Anderson Florentino, had formal visual fields, as well as saw Endocrinology, and has had pituitary labs  The mass does not contact the optic chiasm, there is CSF seen between chiasm in the mass  His labs do not show hypersecretion  I reviewed with the patient the natural history of pituitary adenomas, and has suggested observation at present  I suggest repeat scan in 3-6 months, to assess any signs of growth etcetera  He is in agreement, I have ordered the study  I defer management of his endocrine function to Endocrinology, and etiology of any formal visual field findings to Ophthalmology, as he has no chiasma compression  03/01/21 Metrics: EQ5D5L 89846=5 000; VAS 95; ECOG 0; KPS 90; MOCA 27/30      CHIEF COMPLAINT    Chief Complaint   Patient presents with   [address] Brain Tumor     NEW PT  1 5CM PITUITARY MACROADENOMA  BRAIN MRI SL 12/8/20/PT HAS OPTHO APPT 2/3/21       HISTORY    History of Present Illness     60y o  year old male     HPI    See Discussion    REVIEW OF SYSTEMS    Review of Systems   Constitutional: Negative  HENT: Positive for hearing loss (slightly in his left ear) and tinnitus (like a vibration in his left ear; occasional)  Eyes: Negative  Respiratory: Negative  Cardiovascular: Negative  Gastrointestinal: Negative  Endocrine: Negative  Genitourinary: Negative  Musculoskeletal: Negative  Skin: Negative  Allergic/Immunologic: Negative  Neurological: Negative  Hematological: Negative  Psychiatric/Behavioral: Negative  All other systems reviewed and are negative  Meds/Allergies     Current Outpatient Medications   Medication Sig Dispense Refill    rosuvastatin (CRESTOR) 5 mg tablet Take 1 tablet (5 mg total) by mouth daily 90 tablet 1    fluticasone (FLONASE) 50 mcg/act nasal spray 1 spray into each nostril daily (Patient not taking: Reported on 2/24/2021) 1 Bottle 0    predniSONE 10 mg tablet Take 5 tablets (50 mg total) by mouth daily 5 tabls PO in morning  days 1-3, 4 tabls days 4-6, 3 tabls days 7-9, 2 tabls days 10-12, 1 tabl in morning  days 13-15 (Patient not taking: Reported on 2/23/2021) 45 tablet 0    predniSONE 20 mg tablet Take 1 tablet (20 mg total) by mouth daily (Patient not taking: Reported on 2/23/2021) 7 tablet 0     No current facility-administered medications for this visit  No Known Allergies    PAST HISTORY    Past Medical History:   Diagnosis Date    Benign neoplasm of colon     Colon polyp     GERD (gastroesophageal reflux disease)     mild diet controlled    Hyperlipidemia        Past Surgical History:   Procedure Laterality Date    INGUINAL HERNIA REPAIR Bilateral     MOHS SURGERY  2000    nose    [place] COLONOSCOPY FLX DX W/COLLJ SPEC WHEN PFRMD N/A 12/18/2018    Procedure: COLONOSCOPY;  Surgeon: Zoie Estrada MD;  [facility];   Service: Gastroenterology    TONSILLECTOMY         Social History     Tobacco Use    Smoking status: Never Smoker    Smokeless tobacco: Never Used   Substance Use Topics    Alcohol use: Yes     Frequency: 2-4 times a month     Comment: occasional     Drug use: No       Family History   Problem Relation Age of Onset    Varicose Veins Mother     Hypertension Mother     Parkinsonism Father     Hypertension Father     No Known Problems Sister     No Known Problems Brother     No Known Problems Sister     No Known Problems Sister     No Known Problems Sister     No Known Problems Sister     No Known Problems Brother          The following portions of the patient's history were reviewed in this encounter and updated as appropriate: Past medical, surgical, family, and social history, as well as medications, allergies, and review of systems  EXAM    Vitals:Blood pressure 127/87, pulse 80, temperature 97 6 °F (36 4 °C), temperature source Probe, resp  rate 16, height 5' 8" (1 727 m), weight 81 6 kg (180 lb)  ,Body mass index is 27 37 kg/m²  Physical Exam  Vitals signs reviewed  Constitutional:       Appearance: Normal appearance  He is well-developed  HENT:      Head: Normocephalic and atraumatic  Eyes:      General: No scleral icterus  Neck:      Musculoskeletal: Neck supple  Cardiovascular:      Rate and Rhythm: Normal rate  Pulmonary:      Effort: Pulmonary effort is normal    Skin:     General: Skin is warm and dry  Neurological:      Mental Status: He is alert and oriented to person, place, and time  Sensory: No sensory deficit  Psychiatric:         Speech: Speech normal          Behavior: Behavior normal          Neurologic Exam     Mental Status   Oriented to person, place, and time  Attention: normal    Speech: speech is normal   Level of consciousness: alert    Cranial Nerves     CN VII   Facial expression full, symmetric  Motor Exam   Muscle bulk: normal  Overall muscle tone: normal  Moves all extremities, grossly normal     Gait, Coordination, and Reflexes     Tremor   Resting tremor: absent  Intention tremor: absent  Action tremor: absent  No aids  MEDICAL DECISION MAKING    Imaging Studies:     No results found  I have personally reviewed pertinent reports     and I have personally reviewed pertinent films in PACS     Dr Cee Wise formal visual fields personally reviewed  Lab Results   Component Value Date    PROLACTIN 7 8 02/25/2021    CALCITONIN 159 02/25/2021    [id number] 2 111 02/25/2021    FREET4 0 75 (L) 02/25/2021    FSH 3 8 02/25/2021    LH 2 2 02/25/2021     Lab studies as above personally reviewed

## 2021-04-28 ENCOUNTER — OFFICE VISIT (OUTPATIENT)
Dept: ENDOCRINOLOGY | Facility: CLINIC | Age: 66
End: 2021-04-28
Payer: COMMERCIAL

## 2021-04-28 VITALS
DIASTOLIC BLOOD PRESSURE: 92 MMHG | SYSTOLIC BLOOD PRESSURE: 122 MMHG | HEIGHT: 68 IN | WEIGHT: 188.13 LBS | BODY MASS INDEX: 28.51 KG/M2 | HEART RATE: 80 BPM

## 2021-04-28 DIAGNOSIS — E23.6 PITUITARY MASS (HCC): Primary | ICD-10-CM

## 2021-04-28 PROCEDURE — 99214 OFFICE O/P EST MOD 30 MIN: CPT | Performed by: INTERNAL MEDICINE

## 2021-04-28 RX ORDER — DEXAMETHASONE 1 MG
1 TABLET ORAL ONCE
Qty: 1 TABLET | Refills: 0 | Status: SHIPPED | OUTPATIENT
Start: 2021-04-28 | End: 2021-04-28

## 2021-04-28 NOTE — PROGRESS NOTES
Bharath Lewis 73 y o  male MRN: [id number]    Encounter: [id number]      Assessment/Plan     Assessment: This is a 73 y o  male with pituitary adenoma  Plan:    Diagnoses and all orders for this visit:    Pituitary mass [facility]) -  Laboratory workup negative except concern for possible central hypothyroidism and hypogonadism  I will check thyroid labs and 1 mg dexamethasone suppression test to rule out Cushing disease   -     TSH, 3rd generation Lab Collect; Future  -     T4, free Lab Collect; Future  -     Cortisol Level, AM Specimen Lab Collect; Future  -     dexamethasone (DECADRON) 1 mg tablet; Take 1 tablet (1 mg total) by mouth once for 1 dose Take 1 pill between 10-11pm and have AM cortisol next morning  -     TSH, 3rd generation Lab Collect; Future  -     T4, free Lab Collect; Future  -     Testosterone, free, total Lab Collect; Future     I will see patient back in my office in 4 months  CC:  Pituitary adenoma    History of Present Illness     HPI:  This is a 73 y o  male with pituitary adenoma  He had a pituitary adenoma incidentrally noted on MRI identified as 1 5cm pituitary adenoma  There was rise superiorly towards optic chiasm  Patient had ophthalmology evaluation that was normal  He also saw Dr Misa Moreno who recomeded another MRI in 9/2021 to monitor tumor growth  Pituitary workup was negative except concern for hypogonadism and hypothyroidism  Patient has no clinical signs of Cushing syndrome  He denies any symptoms of low testosterone such as low libido, erectile dysfunction, fractures, low energy, mood disorders  He denies any symptoms of hypothyroidism such as constipation, weight gain, constipation, dry skin, falling of hair, fatigue  Review of Systems   Constitutional: Negative for fatigue and unexpected weight change  HENT: Negative for congestion, postnasal drip and sore throat  Eyes: Negative for pain and redness     Respiratory: Negative for cough, chest tightness, shortness of breath and wheezing  Cardiovascular: Negative for chest pain, palpitations and leg swelling  Gastrointestinal: Negative for abdominal pain, constipation, diarrhea, nausea and vomiting  Endocrine: Negative for polydipsia and polyuria  Genitourinary: Negative for dysuria  Musculoskeletal: Negative for arthralgias and back pain  Neurological: Negative for dizziness, light-headedness and headaches  Psychiatric/Behavioral: Negative for agitation  The patient is not nervous/anxious  All other systems reviewed and are negative  Historical Information   Past Medical History:   Diagnosis Date    Benign neoplasm of colon     Colon polyp     GERD (gastroesophageal reflux disease)     mild diet controlled    Hyperlipidemia      Past Surgical History:   Procedure Laterality Date    INGUINAL HERNIA REPAIR Bilateral     MOHS SURGERY  2000    nose    [place] COLONOSCOPY FLX DX W/COLLJ SPEC WHEN PFRMD N/A 12/18/2018    Procedure: COLONOSCOPY;  Surgeon: Danae Morrison MD;  [facility];   Service: Gastroenterology    TONSILLECTOMY       Social History   Social History     Substance and Sexual Activity   Alcohol Use Yes    Frequency: 2-4 times a month    Comment: occasional      Social History     Substance and Sexual Activity   Drug Use No     Social History     Tobacco Use   Smoking Status Never Smoker   Smokeless Tobacco Never Used     Family History:   Family History   Problem Relation Age of Onset    Varicose Veins Mother    Ana Rosa Mare Hypertension Mother     Parkinsonism Father     Hypertension Father     No Known Problems Sister     No Known Problems Brother     No Known Problems Sister     No Known Problems Sister     No Known Problems Sister     No Known Problems Sister     No Known Problems Brother        Meds/Allergies   Current Outpatient Medications   Medication Sig Dispense Refill    rosuvastatin (CRESTOR) 5 mg tablet Take 1 tablet (5 mg total) by mouth daily 90 tablet 1  fluticasone (FLONASE) 50 mcg/act nasal spray 1 spray into each nostril daily (Patient not taking: Reported on 2/24/2021) 1 Bottle 0    predniSONE 10 mg tablet Take 5 tablets (50 mg total) by mouth daily 5 tabls PO in morning  days 1-3, 4 tabls days 4-6, 3 tabls days 7-9, 2 tabls days 10-12, 1 tabl in morning  days 13-15 (Patient not taking: Reported on 2/23/2021) 45 tablet 0    predniSONE 20 mg tablet Take 1 tablet (20 mg total) by mouth daily (Patient not taking: Reported on 2/23/2021) 7 tablet 0     No current facility-administered medications for this visit  No Known Allergies    Objective   Vitals: Blood pressure 122/92, pulse 80, height 5' 8" (1 727 m), weight 85 3 kg (188 lb 2 oz)  Physical Exam  Constitutional:       Appearance: He is well-developed  HENT:      Head: Normocephalic and atraumatic  Mouth/Throat:      Pharynx: No oropharyngeal exudate  Eyes:      Conjunctiva/sclera: Conjunctivae normal       Pupils: Pupils are equal, round, and reactive to light  Neck:      Musculoskeletal: Neck supple  Cardiovascular:      Rate and Rhythm: Normal rate and regular rhythm  Heart sounds: No murmur  Pulmonary:      Effort: Pulmonary effort is normal  No respiratory distress  Breath sounds: Normal breath sounds  Abdominal:      General: There is no distension  Palpations: Abdomen is soft  Tenderness: There is no abdominal tenderness  Skin:     General: Skin is warm and dry  Findings: No erythema  Neurological:      Mental Status: He is alert  Psychiatric:         Mood and Affect: Mood normal          The history was obtained from the review of the chart, patient  Lab Results:   Lab Results   Component Value Date/Time    TSH 3RD GENERATON 2 111 02/25/2021 07:18 AM    Free T4 0 75 (L) 02/25/2021 07:18 AM       Imaging Studies:   Results for orders placed during the hospital encounter of 08/18/17   US thyroid    Impression    Normal study  Workstation performed: [id number]         I have personally reviewed pertinent reports  Portions of the record may have been created with voice recognition software  Occasional wrong word or "sound a like" substitutions may have occurred due to the inherent limitations of voice recognition software  Read the chart carefully and recognize, using context, where substitutions have occurred

## 2021-05-21 ENCOUNTER — TELEPHONE (OUTPATIENT)
Dept: ENDOCRINOLOGY | Facility: CLINIC | Age: 66
End: 2021-05-21

## 2021-05-21 ENCOUNTER — APPOINTMENT (OUTPATIENT)
Dept: LAB | Facility: HOSPITAL | Age: 66
End: 2021-05-21
Payer: COMMERCIAL

## 2021-05-21 DIAGNOSIS — E23.6 PITUITARY MASS (HCC): ICD-10-CM

## 2021-05-21 LAB
CORTIS AM PEAK SERPL-MCNC: 0.7 UG/DL (ref 4.2–22.4)
T4 FREE SERPL-MCNC: 0.75 NG/DL (ref 0.76–1.46)
TSH SERPL DL<=0.05 MIU/L-ACNC: 1.28 UIU/ML (ref 0.36–3.74)

## 2021-05-21 PROCEDURE — 82533 TOTAL CORTISOL: CPT

## 2021-05-21 PROCEDURE — 84439 ASSAY OF FREE THYROXINE: CPT

## 2021-05-21 PROCEDURE — 36415 COLL VENOUS BLD VENIPUNCTURE: CPT

## 2021-05-21 PROCEDURE — 84443 ASSAY THYROID STIM HORMONE: CPT

## 2021-05-27 ENCOUNTER — TELEPHONE (OUTPATIENT)
Dept: ENDOCRINOLOGY | Facility: CLINIC | Age: 66
End: 2021-05-27

## 2021-05-27 NOTE — TELEPHONE ENCOUNTER
I called pt multiple times, unable to talk with him  Labs suggest central hypothyroidism  I left message for him to call office when is a good time to reach him to discuss this further

## 2021-08-23 ENCOUNTER — TELEPHONE (OUTPATIENT)
Dept: INTERNAL MEDICINE CLINIC | Facility: CLINIC | Age: 66
End: 2021-08-23

## 2021-08-24 NOTE — TELEPHONE ENCOUNTER
Informed patient
Orders in chart  Please advise patient 
Pt has appt w/ you on 9/31 - he is asking if he needs to get blood work done - there are no current orders in his chart
Out of season

## 2021-08-26 ENCOUNTER — APPOINTMENT (OUTPATIENT)
Dept: LAB | Facility: HOSPITAL | Age: 66
End: 2021-08-26

## 2021-08-26 ENCOUNTER — APPOINTMENT (OUTPATIENT)
Dept: LAB | Facility: HOSPITAL | Age: 66
End: 2021-08-26
Payer: COMMERCIAL

## 2021-08-26 DIAGNOSIS — Z13.6 SCREENING FOR CARDIOVASCULAR CONDITION: ICD-10-CM

## 2021-08-26 DIAGNOSIS — E23.6 PITUITARY MASS (HCC): ICD-10-CM

## 2021-08-26 DIAGNOSIS — Z12.5 SCREENING FOR MALIGNANT NEOPLASM OF PROSTATE: ICD-10-CM

## 2021-08-26 LAB
ALBUMIN SERPL BCP-MCNC: 4 G/DL (ref 3.5–5)
ALP SERPL-CCNC: 62 U/L (ref 46–116)
ALT SERPL W P-5'-P-CCNC: 55 U/L (ref 12–78)
ANION GAP SERPL CALCULATED.3IONS-SCNC: 11 MMOL/L (ref 4–13)
AST SERPL W P-5'-P-CCNC: 24 U/L (ref 5–45)
BILIRUB SERPL-MCNC: 0.53 MG/DL (ref 0.2–1)
BUN SERPL-MCNC: 16 MG/DL (ref 5–25)
CALCIUM SERPL-MCNC: 8.8 MG/DL (ref 8.3–10.1)
CHLORIDE SERPL-SCNC: 106 MMOL/L (ref 100–108)
CHOLEST SERPL-MCNC: 192 MG/DL (ref 50–200)
CO2 SERPL-SCNC: 24 MMOL/L (ref 21–32)
CREAT SERPL-MCNC: 1.31 MG/DL (ref 0.6–1.3)
GFR SERPL CREATININE-BSD FRML MDRD: 57 ML/MIN/1.73SQ M
GLUCOSE P FAST SERPL-MCNC: 122 MG/DL (ref 65–99)
HDLC SERPL-MCNC: 38 MG/DL
LDLC SERPL CALC-MCNC: 122 MG/DL (ref 0–100)
POTASSIUM SERPL-SCNC: 4.1 MMOL/L (ref 3.5–5.3)
PROT SERPL-MCNC: 7.4 G/DL (ref 6.4–8.2)
SODIUM SERPL-SCNC: 141 MMOL/L (ref 136–145)
T4 FREE SERPL-MCNC: 0.77 NG/DL (ref 0.76–1.46)
TRIGL SERPL-MCNC: 162 MG/DL
TSH SERPL DL<=0.05 MIU/L-ACNC: 1.93 UIU/ML (ref 0.36–3.74)

## 2021-08-26 PROCEDURE — 84402 ASSAY OF FREE TESTOSTERONE: CPT

## 2021-08-26 PROCEDURE — 84154 ASSAY OF PSA FREE: CPT

## 2021-08-26 PROCEDURE — 84153 ASSAY OF PSA TOTAL: CPT

## 2021-08-26 PROCEDURE — 80061 LIPID PANEL: CPT

## 2021-08-26 PROCEDURE — 84443 ASSAY THYROID STIM HORMONE: CPT

## 2021-08-26 PROCEDURE — 84403 ASSAY OF TOTAL TESTOSTERONE: CPT

## 2021-08-26 PROCEDURE — 80053 COMPREHEN METABOLIC PANEL: CPT

## 2021-08-26 PROCEDURE — 84439 ASSAY OF FREE THYROXINE: CPT

## 2021-08-27 DIAGNOSIS — R79.89 ELEVATED SERUM CREATININE: Primary | ICD-10-CM

## 2021-08-27 LAB
TESTOST FREE SERPL-MCNC: 7.9 PG/ML (ref 6.6–18.1)
TESTOST SERPL-MCNC: 224 NG/DL (ref 264–916)

## 2021-08-28 LAB
PSA FREE MFR SERPL: 18.7 %
PSA FREE SERPL-MCNC: 0.58 NG/ML
PSA SERPL-MCNC: 3.1 NG/ML (ref 0–4)

## 2021-08-30 ENCOUNTER — TELEPHONE (OUTPATIENT)
Dept: ENDOCRINOLOGY | Facility: CLINIC | Age: 66
End: 2021-08-30

## 2021-08-30 NOTE — TELEPHONE ENCOUNTER
----- Message from Jenniffer Johnson MD sent at 8/28/2021  2:06 PM EDT -----  Pls call patient regarding results: pateint continues to have low testosterone and borderline low/normal thyroid functions labs, A1C 5 9%, will discuss further during appt

## 2021-08-31 ENCOUNTER — APPOINTMENT (OUTPATIENT)
Dept: LAB | Facility: HOSPITAL | Age: 66
End: 2021-08-31
Payer: COMMERCIAL

## 2021-08-31 ENCOUNTER — OFFICE VISIT (OUTPATIENT)
Dept: INTERNAL MEDICINE CLINIC | Facility: CLINIC | Age: 66
End: 2021-08-31
Payer: COMMERCIAL

## 2021-08-31 VITALS
HEART RATE: 68 BPM | DIASTOLIC BLOOD PRESSURE: 80 MMHG | RESPIRATION RATE: 16 BRPM | HEIGHT: 68 IN | BODY MASS INDEX: 27.8 KG/M2 | WEIGHT: 183.4 LBS | OXYGEN SATURATION: 98 % | SYSTOLIC BLOOD PRESSURE: 128 MMHG

## 2021-08-31 DIAGNOSIS — Z00.00 WELLNESS EXAMINATION: ICD-10-CM

## 2021-08-31 DIAGNOSIS — R73.03 PREDIABETES: ICD-10-CM

## 2021-08-31 DIAGNOSIS — Z11.4 SCREENING FOR HIV (HUMAN IMMUNODEFICIENCY VIRUS): ICD-10-CM

## 2021-08-31 DIAGNOSIS — E78.5 HYPERLIPIDEMIA, UNSPECIFIED HYPERLIPIDEMIA TYPE: Primary | ICD-10-CM

## 2021-08-31 DIAGNOSIS — R79.89 ELEVATED SERUM CREATININE: ICD-10-CM

## 2021-08-31 DIAGNOSIS — Z23 ENCOUNTER FOR IMMUNIZATION: ICD-10-CM

## 2021-08-31 LAB
ANION GAP SERPL CALCULATED.3IONS-SCNC: 10 MMOL/L (ref 4–13)
BUN SERPL-MCNC: 12 MG/DL (ref 5–25)
CALCIUM SERPL-MCNC: 8.5 MG/DL (ref 8.3–10.1)
CHLORIDE SERPL-SCNC: 108 MMOL/L (ref 100–108)
CO2 SERPL-SCNC: 24 MMOL/L (ref 21–32)
CREAT SERPL-MCNC: 1.25 MG/DL (ref 0.6–1.3)
GFR SERPL CREATININE-BSD FRML MDRD: 60 ML/MIN/1.73SQ M
GLUCOSE P FAST SERPL-MCNC: 118 MG/DL (ref 65–99)
POTASSIUM SERPL-SCNC: 4 MMOL/L (ref 3.5–5.3)
SODIUM SERPL-SCNC: 142 MMOL/L (ref 136–145)

## 2021-08-31 PROCEDURE — 90471 IMMUNIZATION ADMIN: CPT

## 2021-08-31 PROCEDURE — 99397 PER PM REEVAL EST PAT 65+ YR: CPT | Performed by: INTERNAL MEDICINE

## 2021-08-31 PROCEDURE — 36415 COLL VENOUS BLD VENIPUNCTURE: CPT

## 2021-08-31 PROCEDURE — 90732 PPSV23 VACC 2 YRS+ SUBQ/IM: CPT

## 2021-08-31 PROCEDURE — 80048 BASIC METABOLIC PNL TOTAL CA: CPT

## 2021-08-31 NOTE — PROGRESS NOTES
Assessment/Plan:    Wellness examination   Assessment and plan 1  Health maintenance annual wellness examination overall the patient is clinically stable and doing well, we encouraged the patient to follow a healthy and balanced diet  We recommend that the patient exercise routinely approximately 30 minutes 5 times per week   We have reviewed the patient's vaccines and have made recommendations for updates if necessary  Annual flu shot, today patient will receive the Pneumovax 23  Colonoscopy is up-to-date       We will be ordering screening laboratories which are age appropriate  Return to the office in    12month  call if any problems  Problem List Items Addressed This Visit        Other    Hyperlipidemia - Primary    Relevant Orders    Comprehensive metabolic panel    Lipid Panel with Direct LDL reflex    Prediabetes    Relevant Orders    Hemoglobin A1C    Wellness examination      Assessment and plan 1  Health maintenance annual wellness examination overall the patient is clinically stable and doing well, we encouraged the patient to follow a healthy and balanced diet  We recommend that the patient exercise routinely approximately 30 minutes 5 times per week   We have reviewed the patient's vaccines and have made recommendations for updates if necessary  Annual flu shot, today patient will receive the Pneumovax 23  Colonoscopy is up-to-date       We will be ordering screening laboratories which are age appropriate  Return to the office in    12month  call if any problems  Other Visit Diagnoses     Screening for HIV (human immunodeficiency virus)        Encounter for immunization        Relevant Orders    PNEUMOCOCCAL POLYSACCHARIDE VACCINE 23-VALENT =>3YO SQ IM           RTO in 1 year call if any problems  Subjective:      Patient ID: Marsha Guzman is a 73 y o  male      HPI 15-year-old male coming in for wellness exam follows a healthy / balance diet not exercising but active with his workplace  Drives a car safely and wears a seatbelt uses sunscreen  Would like to receive his pneumococcal vaccine and will receive his flu shot at work place colonoscopy is up-to-date  he does explained the over last year it 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 it does not affect his vision he has been working with both Neurosurgery and Endocrinology as a repeat MRI coming up tomorrow and follow up with the endocrinologist to discuss treatment options which could include bromocriptine  Drives safetly, sunscreen , diet improving not exercizing will schedule for the flu, had cov vaccine     The following portions of the patient's history were reviewed and updated as appropriate: allergies, current medications, past family history, past medical history, past social history, past surgical history and problem list     Review of Systems   Constitutional: Negative for activity change, appetite change and unexpected weight change  Eyes: Negative for visual disturbance  Respiratory: Negative for cough and shortness of breath  Cardiovascular: Negative for chest pain  Gastrointestinal: Negative for abdominal pain, diarrhea, nausea and vomiting  Neurological: Negative for dizziness, light-headedness and headaches  Objective:    No follow-ups on file  No results found  No Known Allergies    Past Medical History:   Diagnosis Date    Benign neoplasm of colon     Colon polyp     GERD (gastroesophageal reflux disease)     mild diet controlled    Hyperlipidemia      Past Surgical History:   Procedure Laterality Date    INGUINAL HERNIA REPAIR Bilateral     MOHS SURGERY  2000    nose    [place] COLONOSCOPY FLX DX W/COLLJ SPEC WHEN PFRMD N/A 12/18/2018    Procedure: COLONOSCOPY;  Surgeon: Oliver Freeman MD;  [facility];   Service: Gastroenterology    TONSILLECTOMY       Current Outpatient Medications on File Prior to Visit   Medication Sig Dispense Refill    rosuvastatin (CRESTOR) 5 mg tablet Take 1 tablet (5 mg total) by mouth daily 90 tablet 1    fluticasone (FLONASE) 50 mcg/act nasal spray 1 spray into each nostril daily (Patient not taking: Reported on 2/24/2021) 1 Bottle 0    predniSONE 10 mg tablet Take 5 tablets (50 mg total) by mouth daily 5 tabls PO in morning  days 1-3, 4 tabls days 4-6, 3 tabls days 7-9, 2 tabls days 10-12, 1 tabl in morning  days 13-15 (Patient not taking: Reported on 2/23/2021) 45 tablet 0    predniSONE 20 mg tablet Take 1 tablet (20 mg total) by mouth daily (Patient not taking: Reported on 2/23/2021) 7 tablet 0     No current facility-administered medications on file prior to visit  Family History   Problem Relation Age of Onset    Varicose Veins Mother    [employer] Hypertension Mother     Parkinsonism Father     Hypertension Father     No Known Problems Sister     No Known Problems Brother     No Known Problems Sister     No Known Problems Sister     No Known Problems Sister     No Known Problems Sister     No Known Problems Brother      Social History     Socioeconomic History    Marital status: /Civil Union     Spouse name: Not on file    Number of children: Not on file    Years of education: Not on file    Highest education level: Not on file   Occupational History    Not on file   Tobacco Use    Smoking status: Never Smoker    Smokeless tobacco: Never Used   Substance and Sexual Activity    Alcohol use: Yes     Comment: occasional     Drug use: No    Sexual activity: Not on file   Other Topics Concern    Not on file   Social History Narrative    Dog      Social Determinants of Health     Financial Resource Strain:     Difficulty of Paying Living Expenses:    Food Insecurity:     Worried About Running Out of Food in the Last Year:     [address] in the Last Year:    Transportation Needs:     Lack of Transportation (Medical):      Lack of Transportation (Non-Medical):    Physical Activity:     Days of Exercise per Week:     Minutes of Exercise per Session:    Stress:     Feeling of Stress :    Social Connections:     Frequency of Communication with Friends and Family:     Frequency of Social Gatherings with Friends and Family:     Attends Yazidism Services:     Active Member of Clubs or Organizations:     Attends Club or Organization Meetings:     Marital Status:    Intimate Partner Violence:     Fear of Current or Ex-Partner:     Emotionally Abused:     Physically Abused:     Sexually Abused:      Vitals:    08/31/21 1454   BP: 128/80   Pulse: 68   Resp: 16   SpO2: 98%   Weight: 83 2 kg (183 lb 6 4 oz)   Height: 5' 8" (1 727 m)     Results for orders placed or performed in visit on 39/83/94   Basic metabolic panel   Result Value Ref Range    Sodium 142 136 - 145 mmol/L    Potassium 4 0 3 5 - 5 3 mmol/L    Chloride 108 100 - 108 mmol/L    CO2 24 21 - 32 mmol/L    ANION GAP 10 4 - 13 mmol/L    BUN 12 5 - 25 mg/dL    Creatinine 1 25 0 60 - 1 30 mg/dL    Glucose, Fasting 118 (H) 65 - 99 mg/dL    Calcium 8 5 8 3 - 10 1 mg/dL    eGFR 60 ml/min/1 73sq m     Weight (last 2 days)     Date/Time   Weight    08/31/21 1454   83 2 (183 4)            Body mass index is 27 89 kg/m²  /80 (08/31/21 1454)    Temp      Pulse 68 (08/31/21 1454)   Resp 16 (08/31/21 1454)    SpO2 98 % (08/31/21 1454)      Vitals:    08/31/21 1454   Weight: 83 2 kg (183 lb 6 4 oz)     Vitals:    08/31/21 1454   Weight: 83 2 kg (183 lb 6 4 oz)       /80   Pulse 68   Resp 16   Ht 5' 8" (1 727 m)   Wt 83 2 kg (183 lb 6 4 oz)   SpO2 98%   BMI 27 89 kg/m²          Physical Exam  Constitutional:       General: He is not in acute distress  Appearance: He is well-developed  He is not diaphoretic  HENT:      Head: Normocephalic and atraumatic  Right Ear: External ear normal       Left Ear: External ear normal    Eyes:      General: No scleral icterus  Right eye: No discharge  Left eye: No discharge        Conjunctiva/sclera: Conjunctivae normal       Pupils: Pupils are equal, round, and reactive to light  Cardiovascular:      Rate and Rhythm: Normal rate and regular rhythm  Heart sounds: Normal heart sounds  No murmur heard  No friction rub  No gallop  Pulmonary:      Effort: No respiratory distress  Breath sounds: No wheezing or rales  Abdominal:      General: Bowel sounds are normal  There is no distension  Palpations: Abdomen is soft  There is no mass  Tenderness: There is no abdominal tenderness  There is no guarding or rebound  Musculoskeletal:         General: No deformity  Cervical back: Neck supple  Lymphadenopathy:      Cervical: No cervical adenopathy  Neurological:      Mental Status: He is alert

## 2021-08-31 NOTE — ASSESSMENT & PLAN NOTE
Assessment and plan 1  Health maintenance annual wellness examination overall the patient is clinically stable and doing well, we encouraged the patient to follow a healthy and balanced diet  We recommend that the patient exercise routinely approximately 30 minutes 5 times per week   We have reviewed the patient's vaccines and have made recommendations for updates if necessary  Annual flu shot, today patient will receive the Pneumovax 23  Colonoscopy is up-to-date       We will be ordering screening laboratories which are age appropriate  Return to the office in    12month  call if any problems

## 2021-09-01 ENCOUNTER — HOSPITAL ENCOUNTER (OUTPATIENT)
Dept: RADIOLOGY | Facility: HOSPITAL | Age: 66
Discharge: HOME | End: 2021-09-01
Attending: NEUROLOGICAL SURGERY
Payer: COMMERCIAL

## 2021-09-01 DIAGNOSIS — D35.2 PITUITARY MACROADENOMA (HCC): ICD-10-CM

## 2021-09-01 PROCEDURE — A9585 GADOBUTROL INJECTION: HCPCS | Performed by: NEUROLOGICAL SURGERY

## 2021-09-01 PROCEDURE — 70553 MRI BRAIN STEM W/O & W/DYE: CPT

## 2021-09-01 PROCEDURE — G1004 CDSM NDSC: HCPCS

## 2021-09-01 RX ADMIN — GADOBUTROL 8 ML: 604.72 INJECTION INTRAVENOUS at 09:37

## 2021-09-28 ENCOUNTER — OFFICE VISIT (OUTPATIENT)
Dept: ENDOCRINOLOGY | Facility: CLINIC | Age: 66
End: 2021-09-28
Payer: COMMERCIAL

## 2021-09-28 VITALS
DIASTOLIC BLOOD PRESSURE: 90 MMHG | HEIGHT: 68 IN | SYSTOLIC BLOOD PRESSURE: 130 MMHG | WEIGHT: 187.5 LBS | BODY MASS INDEX: 28.42 KG/M2 | HEART RATE: 66 BPM

## 2021-09-28 DIAGNOSIS — R73.03 PREDIABETES: ICD-10-CM

## 2021-09-28 DIAGNOSIS — E23.6 PITUITARY MASS (HCC): Primary | ICD-10-CM

## 2021-09-28 PROCEDURE — 99213 OFFICE O/P EST LOW 20 MIN: CPT | Performed by: INTERNAL MEDICINE

## 2021-09-28 NOTE — PROGRESS NOTES
Chief Complaint   Patient presents with    Pituitary Adenoma      Referring Provider  No referring provider defined for this encounter  History of Present Illness:   Sam Michel is a 73 y o  male with pituitary adenoma  He was last seen in April 2021      Some fatigue and some changes in his libido  He denies muscle weakness  He has fewer morning erections "a couple" in a month  He has no probelms sleeping  He denies weight changes  He had a pituitary adenoma incidentrally noted on MRI for hearing issues which incidentally identified a 1 5cm pituitary adenoma  There was rise superiorly towards optic chiasm  He Dr Karolina Harrell for visual field testing and completed this (see media)  There is a possible left upper field discrepancy, but not a field cut  Patient Active Problem List   Diagnosis    Hyperlipidemia    Prediabetes    Overweight with body mass index (BMI) of 27 to 27 9 in adult    GERD (gastroesophageal reflux disease)    History of colon polyps    Screening for malignant neoplasm of colon    Asymptomatic varicose veins of both lower extremities    Chronic left shoulder pain    Plantar fasciitis of right foot    Increased prostate specific antigen (PSA) velocity    Need for influenza vaccination    Gastroesophageal reflux disease without esophagitis    Healthcare maintenance    Congestion of both ears    Pituitary mass ([address] )    Wellness examination      Past Medical History:   Diagnosis Date    Benign neoplasm of colon     Colon polyp     GERD (gastroesophageal reflux disease)     mild diet controlled    Hyperlipidemia       Past Surgical History:   Procedure Laterality Date    INGUINAL HERNIA REPAIR Bilateral     MOHS SURGERY  2000    nose    [place] COLONOSCOPY FLX DX W/COLLJ SPEC WHEN PFRMD N/A 12/18/2018    Procedure: COLONOSCOPY;  Surgeon: Neo Callahan MD;  [facility];   Service: Gastroenterology    TONSILLECTOMY        Family History   Problem Relation Age of Onset    Varicose Veins Mother     Hypertension Mother     Parkinsonism Father     Hypertension Father     No Known Problems Sister     No Known Problems Brother     No Known Problems Sister     No Known Problems Sister     No Known Problems Sister     No Known Problems Sister     No Known Problems Brother      Social History     Tobacco Use    Smoking status: Never Smoker    Smokeless tobacco: Never Used   Substance Use Topics    Alcohol use: Yes     Comment: occasional      No Known Allergies      Current Outpatient Medications:     rosuvastatin (CRESTOR) 5 mg tablet, Take 1 tablet (5 mg total) by mouth daily, Disp: 90 tablet, Rfl: 1    fluticasone (FLONASE) 50 mcg/act nasal spray, 1 spray into each nostril daily (Patient not taking: Reported on 2/24/2021), Disp: 1 Bottle, Rfl: 0    predniSONE 10 mg tablet, Take 5 tablets (50 mg total) by mouth daily 5 tabls PO in morning  days 1-3, 4 tabls days 4-6, 3 tabls days 7-9, 2 tabls days 10-12, 1 tabl in morning  days 13-15 (Patient not taking: Reported on 2/23/2021), Disp: 45 tablet, Rfl: 0    predniSONE 20 mg tablet, Take 1 tablet (20 mg total) by mouth daily (Patient not taking: Reported on 2/23/2021), Disp: 7 tablet, Rfl: 0  Review of Systems   Constitutional: Positive for fatigue  HENT: Negative for hearing loss  Eyes: Negative for visual disturbance  Respiratory: Negative for shortness of breath  Cardiovascular: Negative for chest pain  Genitourinary:        Decreased libido   Musculoskeletal: Negative for myalgias  Neurological: Negative for tremors and weakness  Psychiatric/Behavioral: The patient is not nervous/anxious  Physical Exam:  Body mass index is 28 51 kg/m²    /90 (BP Location: Left arm, Patient Position: Sitting, Cuff Size: Standard)   Pulse 66   Ht 5' 8" (1 727 m)   Wt 85 kg (187 lb 8 oz)   BMI 28 51 kg/m²    Wt Readings from Last 3 Encounters:   09/28/21 85 kg (187 lb 8 oz)   08/31/21 83 2 kg (183 lb 6 4 oz)   06/16/21 85 3 kg (188 lb)       GEN: NAD  E/n/m: mask in place, hearing intact bilat  Eyes: no stare or proptosis, nl lids, EOMI  Neck: nl ROM  CV; heart reg rate s1s2 nl, no m/r/g appreciated  Resp: CTAB, good effort  Ab+BS  Neuro: no tremor  MS: no c/c in digits, moves all 4 ext, nl muscle bulk, gait nl  Skin: warm and dry, no palmar erythema  Psych: nl mood and affect, no gross lapses in memory    DATA:  Labs:   8/2021  TSH 1 93  Free t4 0 77  Totoal testo 224, Free Testo 7 9    2/25/2021  TSH 2 11  Free T4 0 75  ACTH/Corti 27/11  PRL 7 8  FSH/LH/testo 3 8/2 2/257 (11 6)        Radiology  9/2021  SELLA AND PITUITARY GLAND:  1 7 x 1 2 x 1 5 cm (AP x TRV x CC dimensions) heterogeneously hypoenhancing sellar mass involving the entire anterior pituitary gland (13:40,10:6), unchanged when remeasured by this user  This mass abuts bilateral medial ICA   cavernous segments  Impression:  1  Pituitary mass ([facility] [address] )    2  Prediabetes           Plan:    Spencer Tucker was seen today for pituitary adenoma  Diagnoses and all orders for this visit:    [facility]  -     Testosterone, free, total Lab Collect; Future  -     Follicle stimulating hormone Lab Collect; Future  -     TSH, 3rd generation Lab Collect; Future  -     T4, free- Lab Collect; Future  -     Cortisol- Lab Collect; Future  -     ACTH- Lab Collect; Future  -     CBC and Platelet- Lab Collect; Future    Prediabetes          1  Pituitary adenoma: MRI being monitored by Dr Herb Alvarado  Plan to monitor TSH, free T4, and cortisol in Nov 2021     2  Low  Testo: Labs are similar to past studies  He has some symptoms of hypogonadism  Will plan to check labs in November 2021, and may starte testo after there  He denies hx of cardiac disease  Discussed using gel and need for monitoring CBC, lfts, and PSa  Will get updated CBC in Nov 2021      Discussed with the patient and all questioned fully answered  He will call me if any problems arise          Qi Chavira Dean Baker MD

## 2021-10-04 ENCOUNTER — OFFICE VISIT (OUTPATIENT)
Dept: NEUROSURGERY | Facility: CLINIC | Age: 66
End: 2021-10-04
Payer: COMMERCIAL

## 2021-10-04 VITALS
WEIGHT: 187 LBS | TEMPERATURE: 97.9 F | SYSTOLIC BLOOD PRESSURE: 150 MMHG | DIASTOLIC BLOOD PRESSURE: 90 MMHG | HEART RATE: 84 BPM | RESPIRATION RATE: 18 BRPM | HEIGHT: 68 IN | BODY MASS INDEX: 28.34 KG/M2

## 2021-10-04 DIAGNOSIS — E23.6 PITUITARY MASS (HCC): Primary | ICD-10-CM

## 2021-10-04 PROCEDURE — 99214 OFFICE O/P EST MOD 30 MIN: CPT | Performed by: PHYSICIAN ASSISTANT

## 2021-10-07 ENCOUNTER — TELEPHONE (OUTPATIENT)
Dept: GASTROENTEROLOGY | Facility: AMBULARY SURGERY CENTER | Age: 66
End: 2021-10-07

## 2021-10-26 ENCOUNTER — IMMUNIZATIONS (OUTPATIENT)
Dept: FAMILY MEDICINE CLINIC | Facility: HOSPITAL | Age: 66
End: 2021-10-26

## 2021-10-26 DIAGNOSIS — Z23 ENCOUNTER FOR IMMUNIZATION: Primary | ICD-10-CM

## 2022-01-05 NOTE — DISCHARGE INSTR - AVS FIRST PAGE
- Stable  - Monitor     COLONOSCOPY    PROCEDURE: Colonoscopy/ Polypectomy (Cold Snare)    INDICATIONS: Screening for Colon Cancer, History of Colon Polyps    POST-OP DIAGNOSIS: See the impression below    SEDATION: Monitored anesthesia care, check anesthesia records    PHYSICAL EXAM:    /95   Pulse 75   Temp 97 7 °F (36 5 °C) (Temporal)   Resp 18   Ht 5' 8" (1 727 m)   Wt 77 1 kg (170 lb)   SpO2 96%   BMI 25 85 kg/m²    Body mass index is 25 85 kg/m²  General: NAD  Heart: S1 & S2 normal, RRR  Lungs: CTA, No rales or rhonchi  Abdomen: Soft, nontender, nondistended, good bowel sounds    CONSENT:  Informed consent was obtained for the procedure, including sedation after explaining the risks and benefits of the procedure  Risks including but not limited to bleeding, perforation, infection, aspiration were discussed in detail  Also explained about less than 100%$ sensitivity with the exam and other alternatives  PREPARATION:   EKG tracing, pulse oximetry, blood pressure were monitored throughout the procedure  Patient was identified by myself both verbally and by visual inspection of ID band  DESCRIPTION:   Patient was placed in the left lateral decubitus position and was sedated with the above medication  Digital rectal examination was performed  The colonoscope was introduced in to the anal canal and advanced up to cecum, which was identified by the appendiceal orifice and IC valve  A careful inspection was made as the colonoscope was withdrawn, including a retroflexed view of the rectum; findings and interventions are described below  Appropriate photodocumentation was obtained  The quality of the colonic preparation was adequate  FINDINGS:    1  Cecum and ileocecal valve-normal mucosa    2  Ascending colon-4 mm polyp was removed with cold snare  Also noted ink marker in the mid sigmoid area      3  The rest of the colon mucosa is normal         IMPRESSIONS:      As above    RECOMMENDATIONS:    Check pathology    COMPLICATIONS:  None; patient tolerated the procedure well      DISPOSITION: PACU           CONDITION: Stable

## 2022-03-25 ENCOUNTER — APPOINTMENT (OUTPATIENT)
Dept: LAB | Facility: HOSPITAL | Age: 67
End: 2022-03-25
Attending: INTERNAL MEDICINE
Payer: COMMERCIAL

## 2022-03-25 DIAGNOSIS — E23.6 PITUITARY MASS (HCC): ICD-10-CM

## 2022-03-25 LAB
CORTIS SERPL-MCNC: 10.8 UG/DL
ERYTHROCYTE [DISTWIDTH] IN BLOOD BY AUTOMATED COUNT: 12.7 % (ref 11.6–15.1)
FSH SERPL-ACNC: 3.9 MIU/ML (ref 0.7–10.8)
HCT VFR BLD AUTO: 47.7 % (ref 36.5–49.3)
HGB BLD-MCNC: 16.5 G/DL (ref 12–17)
MCH RBC QN AUTO: 31.4 PG (ref 26.8–34.3)
MCHC RBC AUTO-ENTMCNC: 34.6 G/DL (ref 31.4–37.4)
MCV RBC AUTO: 91 FL (ref 82–98)
PLATELET # BLD AUTO: 188 THOUSANDS/UL (ref 149–390)
PMV BLD AUTO: 9.2 FL (ref 8.9–12.7)
PSA SERPL-MCNC: 4 NG/ML (ref 0–4)
RBC # BLD AUTO: 5.26 MILLION/UL (ref 3.88–5.62)
T4 FREE SERPL-MCNC: 0.76 NG/DL (ref 0.76–1.46)
TSH SERPL DL<=0.05 MIU/L-ACNC: 2.11 UIU/ML (ref 0.36–3.74)
WBC # BLD AUTO: 5.95 THOUSAND/UL (ref 4.31–10.16)

## 2022-03-25 PROCEDURE — 84153 ASSAY OF PSA TOTAL: CPT

## 2022-03-25 PROCEDURE — 84402 ASSAY OF FREE TESTOSTERONE: CPT

## 2022-03-25 PROCEDURE — 84439 ASSAY OF FREE THYROXINE: CPT

## 2022-03-25 PROCEDURE — 84403 ASSAY OF TOTAL TESTOSTERONE: CPT

## 2022-03-25 PROCEDURE — 83001 ASSAY OF GONADOTROPIN (FSH): CPT

## 2022-03-25 PROCEDURE — 36415 COLL VENOUS BLD VENIPUNCTURE: CPT

## 2022-03-25 PROCEDURE — 82533 TOTAL CORTISOL: CPT

## 2022-03-25 PROCEDURE — 85027 COMPLETE CBC AUTOMATED: CPT

## 2022-03-25 PROCEDURE — 82024 ASSAY OF ACTH: CPT

## 2022-03-25 PROCEDURE — 84443 ASSAY THYROID STIM HORMONE: CPT

## 2022-03-26 LAB
ACTH PLAS-MCNC: 26.6 PG/ML (ref 7.2–63.3)
TESTOST FREE SERPL-MCNC: 7.5 PG/ML (ref 6.6–18.1)
TESTOST SERPL-MCNC: 257 NG/DL (ref 264–916)

## 2022-03-29 ENCOUNTER — OFFICE VISIT (OUTPATIENT)
Dept: ENDOCRINOLOGY | Facility: CLINIC | Age: 67
End: 2022-03-29
Payer: COMMERCIAL

## 2022-03-29 VITALS
HEIGHT: 68 IN | WEIGHT: 188.8 LBS | SYSTOLIC BLOOD PRESSURE: 124 MMHG | HEART RATE: 66 BPM | BODY MASS INDEX: 28.61 KG/M2 | DIASTOLIC BLOOD PRESSURE: 80 MMHG

## 2022-03-29 DIAGNOSIS — E34.9 TESTOSTERONE DEFICIENCY: ICD-10-CM

## 2022-03-29 DIAGNOSIS — D35.2 PITUITARY MACROADENOMA (HCC): Primary | ICD-10-CM

## 2022-03-29 DIAGNOSIS — R73.03 PREDIABETES: ICD-10-CM

## 2022-03-29 PROCEDURE — 99214 OFFICE O/P EST MOD 30 MIN: CPT | Performed by: INTERNAL MEDICINE

## 2022-03-29 NOTE — PROGRESS NOTES
Established Patient Progress Note    CC: pituitary adenoma    History of Present Illness:   72 yo M presenting for followup of pituitary adenoma  He was last seen in office  9/2021  Mr Windy Butts has been following for evaluation of now 1 7cm pituitary adenoma which was initially discovered on MRI in 12/2020 for hearing loss  He has been evaluated by neurosurgery and by ophthalmology  He has no complaints today  He reports energy levels improve in the summer  He denies fatigue, muscle weakness  When asked about symptoms of low testosterone like erectile dysfunction and morning erections, he reports his symptoms are unchanged from prior  At last visit, testosterone gel was discussed as treatment for his symptoms but today he reports he is not interested in starting it at this time  He denies weight loss, orthostatic symptoms, heat or cold intolerance, diarrhea, constipation, palpitations      Patient Active Problem List   Diagnosis    Hyperlipidemia    Prediabetes    Overweight with body mass index (BMI) of 27 to 27 9 in adult    GERD (gastroesophageal reflux disease)    History of colon polyps    Screening for malignant neoplasm of colon    Asymptomatic varicose veins of both lower extremities    Chronic left shoulder pain    Plantar fasciitis of right foot    Increased prostate specific antigen (PSA) velocity    Need for influenza vaccination    Gastroesophageal reflux disease without esophagitis    Healthcare maintenance    Congestion of both ears    Pituitary mass ([address] )    Wellness examination     Past Medical History:   Diagnosis Date    Benign neoplasm of colon     Colon polyp     GERD (gastroesophageal reflux disease)     mild diet controlled    Hyperlipidemia       Past Surgical History:   Procedure Laterality Date    INGUINAL HERNIA REPAIR Bilateral     MOHS SURGERY  2000    nose    [place] COLONOSCOPY FLX DX W/COLLJ SPEC WHEN PFRMD N/A 12/18/2018    Procedure: COLONOSCOPY;  Surgeon: Alexandro Reyes Dorota Barahona MD;  [facility]; Service: Gastroenterology    TONSILLECTOMY        Family History   Problem Relation Age of Onset    Varicose Veins Mother     Hypertension Mother     Parkinsonism Father     Hypertension Father     No Known Problems Sister     No Known Problems Brother     No Known Problems Sister     No Known Problems Sister     No Known Problems Sister     No Known Problems Sister     No Known Problems Brother      Social History     Tobacco Use    Smoking status: Never Smoker    Smokeless tobacco: Never Used   Substance Use Topics    Alcohol use: Yes     Comment: occasional      No Known Allergies    Review of Systems   Constitutional: Negative for fatigue and unexpected weight change  Respiratory: Negative for shortness of breath  Cardiovascular: Negative for palpitations  Gastrointestinal: Negative for constipation and diarrhea  Endocrine: Negative for cold intolerance and heat intolerance  Neurological: Negative for dizziness and light-headedness  Psychiatric/Behavioral: Negative for sleep disturbance  Current Outpatient Medications:     rosuvastatin (CRESTOR) 5 mg tablet, Take 1 tablet (5 mg total) by mouth daily, Disp: 90 tablet, Rfl: 1    Physical Exam:  Body mass index is 28 71 kg/m²  /80   Pulse 66   Ht 5' 8" (1 727 m)   Wt 85 6 kg (188 lb 12 8 oz)   BMI 28 71 kg/m²        Physical Exam  Vitals reviewed  Constitutional:       Appearance: He is well-developed  HENT:      Head: Normocephalic and atraumatic  Eyes:      General:         Right eye: No discharge  Left eye: No discharge  Cardiovascular:      Rate and Rhythm: Normal rate and regular rhythm  Heart sounds: Normal heart sounds  No murmur heard  No friction rub  No gallop  Pulmonary:      Effort: Pulmonary effort is normal  No respiratory distress  Breath sounds: Normal breath sounds  No wheezing or rales  Chest:      Chest wall: No tenderness  Abdominal:      General: Bowel sounds are normal  There is no distension  Palpations: Abdomen is soft  There is no mass  Tenderness: There is no abdominal tenderness  Musculoskeletal:         General: Normal range of motion  Cervical back: Normal range of motion and neck supple  Skin:     General: Skin is warm and dry  Neurological:      Mental Status: He is alert and oriented to person, place, and time  Psychiatric:         Behavior: Behavior normal          Labs:     Lab Results   Component Value Date    LGP2IAQZRECX 2 110 03/25/2022       Lab Results   Component Value Date    CREATININE 1 25 08/31/2021    CREATININE 1 31 (H) 08/26/2021    CREATININE 1 22 07/31/2020    BUN 12 08/31/2021    K 4 0 08/31/2021     08/31/2021    CO2 24 08/31/2021     eGFR   Date Value Ref Range Status   08/31/2021 60 ml/min/1 73sq m Final       Lab Results   Component Value Date    ALT 55 08/26/2021    AST 24 08/26/2021    ALKPHOS 62 08/26/2021       Lab Results   Component Value Date    CHOLESTEROL 192 08/26/2021    CHOLESTEROL 191 07/31/2020    CHOLESTEROL 201 (H) 09/24/2019     Lab Results   Component Value Date    HDL 38 (L) 08/26/2021    HDL 37 (L) 07/31/2020    HDL 38 (L) 09/24/2019     Lab Results   Component Value Date    TRIG 162 (H) 08/26/2021    TRIG 205 (H) 07/31/2020    TRIG 178 (H) 09/24/2019     Lab Results   Component Value Date    [place] 211 04/27/2018       Impression:  1  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[facility] [address] )    2  Prediabetes    3  Testosterone deficiency       Plan:    Diagnoses and all orders for this visit:    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[address] )  -     Cortisol Level, AM Specimen Lab Collect; Future  -     T4, free Lab Collect; Future  -     TSH, 3rd generation Lab Collect; Future  -     Testosterone, free, total Lab Collect; Future  -     Follicle stimulating hormone Lab Collect; Future  -     ACTH- Lab Collect; Future  Continue biochemical monitoring   Pt reports he is due for eye exam with normal eye doctor shortly  He reports following with neurosurgery with next MRI in October 2021  Recheck labwork in 6 months prior to next visit    Prediabetes  -     Basic metabolic panel Lab Collect; Future  -     Hemoglobin A1C; Future  Last a1c 5 9 in 8/2021, recheck with next set of labs    Testosterone deficiency  -     Testosterone, free, total Lab Collect; Future  -     Follicle stimulating hormone Lab Collect; Future  Continue to monitor  Though he does seem to be experiencing some symptoms, pt is not interested in treatment at this time  I have spent 35 minutes with patient today in which greater than 50% of this time was spent in counseling/coordination of care  All questioned fully answered  He will call me if any problems arise

## 2022-04-01 ENCOUNTER — OFFICE VISIT (OUTPATIENT)
Dept: UROLOGY | Facility: CLINIC | Age: 67
End: 2022-04-01
Payer: COMMERCIAL

## 2022-04-01 VITALS
OXYGEN SATURATION: 99 % | SYSTOLIC BLOOD PRESSURE: 138 MMHG | BODY MASS INDEX: 28.28 KG/M2 | WEIGHT: 186 LBS | DIASTOLIC BLOOD PRESSURE: 78 MMHG | HEART RATE: 70 BPM

## 2022-04-01 DIAGNOSIS — R97.20 ELEVATED PSA: Primary | ICD-10-CM

## 2022-04-01 PROCEDURE — 99213 OFFICE O/P EST LOW 20 MIN: CPT | Performed by: PHYSICIAN ASSISTANT

## 2022-04-01 NOTE — PROGRESS NOTES
UROLOGY PROGRESS NOTE   Patient Identifiers: Libra Simon (MRN [id number])  Date of Service: 4/1/2022    Subjective:     79-year-old man history of elevated PSA  He had no significant outlet symptoms  He had a high PSA of 3 9 and I baseline around 1 8  His current PSA is 4 0  He thinks he may have had sexual activity near the time of the test       Reason for visit:  Elevated PSA follow-up     Objective:     VITALS:    Vitals:    04/01/22 1105   BP: 138/78   Pulse: 70   SpO2: 99%           LABS:  Lab Results   Component Value Date    HGB 16 5 03/25/2022    HCT 47 7 03/25/2022    WBC 5 95 03/25/2022     03/25/2022   ]    Lab Results   Component Value Date    K 4 0 08/31/2021     08/31/2021    CO2 24 08/31/2021    BUN 12 08/31/2021    CREATININE 1 25 08/31/2021    CALCIUM 8 5 08/31/2021   ]        INPATIENT MEDS:    Current Outpatient Medications:     rosuvastatin (CRESTOR) 5 mg tablet, Take 1 tablet (5 mg total) by mouth daily, Disp: 90 tablet, Rfl: 1      Physical Exam:   /78   Pulse 70   Wt 84 4 kg (186 lb)   SpO2 99%   BMI 28 28 kg/m²   GEN: no acute distress    RESP: breathing comfortably with no accessory muscle use    ABD: soft, non-tender, non-distended   INCISION:    EXT: no significant peripheral edema   (Male): Penis uncircumcised, phallus normal, meatus patent  Testicles descended into scrotum bilaterally without masses nor tenderness  No inguinal hernias bilaterally  DEEJAY: Prostate is not enlarged at 30 grams  The prostate is not boggy  The prostate is not tender  No nodules noted      RADIOLOGY:   none     Assessment:    1  BPH   2   Elevated PSA     Plan:   - follow-up in 6 months with PSA prior to visit  - no sexual activity for 3-4 days prior to test  -  -

## 2022-04-06 DIAGNOSIS — E78.5 HYPERLIPIDEMIA, UNSPECIFIED HYPERLIPIDEMIA TYPE: ICD-10-CM

## 2022-04-06 RX ORDER — ROSUVASTATIN CALCIUM 5 MG/1
TABLET, COATED ORAL
Qty: 90 TABLET | Refills: 0 | Status: SHIPPED | OUTPATIENT
Start: 2022-04-06

## 2022-04-28 ENCOUNTER — TELEPHONE (OUTPATIENT)
Dept: GASTROENTEROLOGY | Facility: AMBULARY SURGERY CENTER | Age: 67
End: 2022-04-28

## 2022-04-28 NOTE — TELEPHONE ENCOUNTER
LVM for pt re: scheduling repeat COLONOSCOPY w/ dr mars/provided pt w/ direct phone # in scheduling on pt's phone message

## 2022-08-25 ENCOUNTER — APPOINTMENT (OUTPATIENT)
Dept: LAB | Facility: HOSPITAL | Age: 67
End: 2022-08-25
Payer: COMMERCIAL

## 2022-08-25 DIAGNOSIS — R73.03 PREDIABETES: ICD-10-CM

## 2022-08-25 DIAGNOSIS — Z00.8 OTHER SPECIFIED GENERAL MEDICAL EXAMINATION: ICD-10-CM

## 2022-08-25 DIAGNOSIS — E78.5 HYPERLIPIDEMIA, UNSPECIFIED HYPERLIPIDEMIA TYPE: ICD-10-CM

## 2022-08-25 DIAGNOSIS — R97.20 ELEVATED PSA: ICD-10-CM

## 2022-08-25 DIAGNOSIS — Z00.8 HEALTH EXAMINATION IN POPULATION SURVEYS: ICD-10-CM

## 2022-08-25 LAB
ALBUMIN SERPL BCP-MCNC: 4 G/DL (ref 3.5–5)
ALP SERPL-CCNC: 60 U/L (ref 46–116)
ALT SERPL W P-5'-P-CCNC: 48 U/L (ref 12–78)
ANION GAP SERPL CALCULATED.3IONS-SCNC: 13 MMOL/L (ref 4–13)
AST SERPL W P-5'-P-CCNC: 27 U/L (ref 5–45)
BILIRUB SERPL-MCNC: 0.43 MG/DL (ref 0.2–1)
BUN SERPL-MCNC: 19 MG/DL (ref 5–25)
CALCIUM SERPL-MCNC: 8.7 MG/DL (ref 8.3–10.1)
CHLORIDE SERPL-SCNC: 106 MMOL/L (ref 96–108)
CHOLEST SERPL-MCNC: 208 MG/DL
CO2 SERPL-SCNC: 23 MMOL/L (ref 21–32)
CREAT SERPL-MCNC: 1.26 MG/DL (ref 0.6–1.3)
EST. AVERAGE GLUCOSE BLD GHB EST-MCNC: 137 MG/DL
GFR SERPL CREATININE-BSD FRML MDRD: 59 ML/MIN/1.73SQ M
GLUCOSE P FAST SERPL-MCNC: 116 MG/DL (ref 65–99)
HBA1C MFR BLD: 6.4 %
HDLC SERPL-MCNC: 39 MG/DL
LDLC SERPL CALC-MCNC: 142 MG/DL (ref 0–100)
POTASSIUM SERPL-SCNC: 4.1 MMOL/L (ref 3.5–5.3)
PROT SERPL-MCNC: 7.6 G/DL (ref 6.4–8.4)
SODIUM SERPL-SCNC: 142 MMOL/L (ref 135–147)
TRIGL SERPL-MCNC: 135 MG/DL

## 2022-08-25 PROCEDURE — 36415 COLL VENOUS BLD VENIPUNCTURE: CPT

## 2022-08-25 PROCEDURE — 83036 HEMOGLOBIN GLYCOSYLATED A1C: CPT

## 2022-08-25 PROCEDURE — 80061 LIPID PANEL: CPT

## 2022-08-25 PROCEDURE — 80053 COMPREHEN METABOLIC PANEL: CPT

## 2022-09-06 ENCOUNTER — OFFICE VISIT (OUTPATIENT)
Dept: INTERNAL MEDICINE CLINIC | Facility: CLINIC | Age: 67
End: 2022-09-06
Payer: COMMERCIAL

## 2022-09-06 VITALS
SYSTOLIC BLOOD PRESSURE: 122 MMHG | OXYGEN SATURATION: 99 % | HEART RATE: 66 BPM | HEIGHT: 68 IN | WEIGHT: 184.2 LBS | DIASTOLIC BLOOD PRESSURE: 82 MMHG | BODY MASS INDEX: 27.92 KG/M2

## 2022-09-06 DIAGNOSIS — E78.5 HYPERLIPIDEMIA, UNSPECIFIED HYPERLIPIDEMIA TYPE: ICD-10-CM

## 2022-09-06 DIAGNOSIS — R73.03 PREDIABETES: ICD-10-CM

## 2022-09-06 DIAGNOSIS — Z00.00 ANNUAL PHYSICAL EXAM: ICD-10-CM

## 2022-09-06 DIAGNOSIS — Z00.00 WELLNESS EXAMINATION: Primary | ICD-10-CM

## 2022-09-06 DIAGNOSIS — Z86.010 HISTORY OF COLON POLYPS: ICD-10-CM

## 2022-09-06 DIAGNOSIS — I87.2 VENOUS INSUFFICIENCY: ICD-10-CM

## 2022-09-06 PROCEDURE — 99397 PER PM REEVAL EST PAT 65+ YR: CPT | Performed by: INTERNAL MEDICINE

## 2022-09-06 NOTE — ASSESSMENT & PLAN NOTE
Patient reports me he has not been taking the Crestor on a daily basis reports me he can not tolerate and will restart Crestor 5 mg once daily I did explain to patient optimal LDL under 100 recommend a low-cholesterol diet will check a comprehensive metabolic panel and lipid panel at 1 year

## 2022-09-06 NOTE — PATIENT INSTRUCTIONS

## 2022-09-06 NOTE — ASSESSMENT & PLAN NOTE
Dopplers in the past negative for DVT, does show reflux disease patient did see vascular in the past prescribed compression stocking he never use them at this point a new prescription for compression stockings provided he is currently asymptomatic I did explain to him if his symptoms worsen may be a candidate for surgical intervention will discuss further at next visit

## 2022-09-06 NOTE — PROGRESS NOTES
Luis Alberto    NAME: Yvette Contreras  AGE: 78 y o  SEX: male  : 1955     DATE: 2022     Assessment and Plan:     Problem List Items Addressed This Visit        Cardiovascular and Mediastinum    Venous insufficiency     Dopplers in the past negative for DVT, does show reflux disease patient did see vascular in the past prescribed compression stocking he never use them at this point a new prescription for compression stockings provided he is currently asymptomatic I did explain to him if his symptoms worsen may be a candidate for surgical intervention will discuss further at next visit         Relevant Orders    Compression Stocking       Other    Hyperlipidemia     Patient reports me he has not been taking the Crestor on a daily basis reports me he can not tolerate and will restart Crestor 5 mg once daily I did explain to patient optimal LDL under 100 recommend a low-cholesterol diet will check a comprehensive metabolic panel and lipid panel at 1 year         Relevant Orders    Comprehensive metabolic panel    Lipid Panel with Direct LDL reflex    Prediabetes     Pre diabetes -I have counseled the patient to follow a healthy balanced diet, I have counseled patient reduce carbohydrates and sweets in the diet, I would like the patient exercise routinely  I will be checking hemoglobin A1c and comprehensive metabolic panel  Have counseled patient about the prevention of diabetes, and the risk of progression to type 2 diabetes  Relevant Orders    Hemoglobin A1C    History of colon polyps     Patient see GI for screening colonoscopy         Relevant Orders    Ambulatory Referral to Gastroenterology    Wellness examination - Primary     Assessment and plan 1    Health maintenance annual wellness examination overall the patient is clinically stable and doing well, we encouraged the patient to follow a healthy and balanced diet   We recommend that the patient exercise routinely approximately 30 minutes 5 times per week   We have reviewed the patient's vaccines and have made recommendations for updates if necessary  annual flu shot      We will be ordering screening laboratories which are age appropriate  Return to the office in  1 year    call if any problems  Other Visit Diagnoses     Annual physical exam            Left leg varicose vein ?prominent for many years he has seen a vascular specialist and had Dopplers in the past that does show reflux disease he did not get the compression stockings which were recommended by the vascular specialist and he is on his feet for long periods  Immunizations and preventive care screenings were discussed with patient today  Appropriate education was printed on patient's after visit summary  Counseling:  Exercise: the importance of regular exercise/physical activity was discussed  Recommend exercise 3-5 times per week for at least 30 minutes  BMI Counseling: Body mass index is 28 01 kg/m²  The BMI is above normal  Nutrition recommendations include decreasing portion sizes and moderation in carbohydrate intake  Exercise recommendations include exercising 3-5 times per week  Rationale for BMI follow-up plan is due to patient being overweight or obese  Depression Screening and Follow-up Plan: Patient was screened for depression during today's encounter  They screened negative with a PHQ-2 score of 0  Return in about 1 year (around 9/6/2023)  Chief Complaint:     Chief Complaint   Patient presents with    Physical Exam      History of Present Illness:     Adult Annual Physical   Patient here for a comprehensive physical exam  The patient reports no problems  Diet and Physical Activity  Diet/Nutrition:  Healthy/balance  Exercise: walking        Depression Screening  PHQ-2/9 Depression Screening    Little interest or pleasure in doing things: 0 - not at all  Feeling down, depressed, or hopeless: 0 - not at all  PHQ-2 Score: 0  PHQ-2 Interpretation: Negative depression screen       General Health  Sleep: sleeps well  Hearing: normal - bilateral   Vision: no vision problems and goes for regular eye exams  Dental: regular dental visits   Health  Symptoms include: none     Review of Systems:     Review of Systems   Constitutional: Negative for activity change, appetite change and unexpected weight change  HENT: Negative for congestion and postnasal drip  Eyes: Negative for visual disturbance  Respiratory: Negative for cough and shortness of breath  Cardiovascular: Negative for chest pain  Gastrointestinal: Negative for abdominal pain, diarrhea, nausea and vomiting  Neurological: Negative for dizziness, light-headedness and headaches  Past Medical History:     Past Medical History:   Diagnosis Date    Benign neoplasm of colon     Colon polyp     GERD (gastroesophageal reflux disease)     mild diet controlled    Hyperlipidemia       Past Surgical History:     Past Surgical History:   Procedure Laterality Date    INGUINAL HERNIA REPAIR Bilateral     MOHS SURGERY  2000    nose    [place] COLONOSCOPY FLX DX W/COLLJ SPEC WHEN PFRMD N/A 12/18/2018    Procedure: COLONOSCOPY;  Surgeon: Arthur Brandt MD;  [facility];   Service: Gastroenterology    TONSILLECTOMY        Family History:     Family History   Problem Relation Age of Onset    Varicose Veins Mother     Hypertension Mother     Parkinsonism Father     Hypertension Father     No Known Problems Sister     No Known Problems Brother     No Known Problems Sister     No Known Problems Sister     No Known Problems Sister     No Known Problems Sister     No Known Problems Brother       Social History:     Social History     Socioeconomic History    Marital status: /Civil Union     Spouse name: None    Number of children: None    Years of education: None    Highest education level: None   Occupational History    None   Tobacco Use    Smoking status: Never Smoker    Smokeless tobacco: Never Used   Vaping Use    Vaping Use: Never used   Substance and Sexual Activity    Alcohol use: Yes     Alcohol/week: 14 0 standard drinks     Types: 14 Cans of beer per week    Drug use: No    Sexual activity: Yes   Other Topics Concern    None   Social History Narrative    Dog      Social Determinants of Health     Financial Resource Strain: Not on file   Food Insecurity: Not on file   Transportation Needs: Not on file   Physical Activity: Not on file   Stress: Not on file   Social Connections: Not on file   Intimate Partner Violence: Not on file   Housing Stability: Not on file      Current Medications:     Current Outpatient Medications   Medication Sig Dispense Refill    rosuvastatin (CRESTOR) 5 mg tablet TAKE ONE TABLET BY MOUTH EVERY DAY 90 tablet 0     No current facility-administered medications for this visit  Allergies:     No Known Allergies   Physical Exam:     /82   Pulse 66   Ht 5' 8" (1 727 m)   Wt 83 6 kg (184 lb 3 2 oz)   SpO2 99%   BMI 28 01 kg/m²     Physical Exam  Vitals reviewed  Constitutional:       General: He is not in acute distress  Appearance: Normal appearance  He is well-developed  He is not ill-appearing, toxic-appearing or diaphoretic  HENT:      Head: Normocephalic and atraumatic  Right Ear: External ear normal       Left Ear: External ear normal       Nose: Nose normal    Eyes:      Pupils: Pupils are equal, round, and reactive to light  Cardiovascular:      Rate and Rhythm: Normal rate and regular rhythm  Heart sounds: Normal heart sounds  No murmur heard  Pulmonary:      Effort: Pulmonary effort is normal       Breath sounds: Normal breath sounds  Abdominal:      General: There is no distension  Palpations: Abdomen is soft  Tenderness: There is no abdominal tenderness  There is no guarding  Neurological:      Mental Status: He is alert            Carson Contreras DO  [facility]

## 2022-09-12 ENCOUNTER — PREP FOR PROCEDURE (OUTPATIENT)
Dept: GASTROENTEROLOGY | Facility: AMBULARY SURGERY CENTER | Age: 67
End: 2022-09-12

## 2022-09-12 ENCOUNTER — TELEPHONE (OUTPATIENT)
Dept: GASTROENTEROLOGY | Facility: AMBULARY SURGERY CENTER | Age: 67
End: 2022-09-12

## 2022-09-12 DIAGNOSIS — Z86.010 HISTORY OF COLON POLYPS: Primary | ICD-10-CM

## 2022-09-12 DIAGNOSIS — Z12.11 SCREENING FOR MALIGNANT NEOPLASM OF COLON: ICD-10-CM

## 2022-09-12 NOTE — TELEPHONE ENCOUNTER
Patient is scheduled for colonoscopy on December 12 , 2022 at [facility]  with Maicol Evans MD  Patient is aware of pre-procedure prep of Miralax/Dulcolax and they will be called the day prior between 2 and 6 pm for time to report for procedure  Pre-procedure prep has been given to the patient  via [address] mail and e-mail on September 12 , 2022

## 2022-09-20 ENCOUNTER — APPOINTMENT (OUTPATIENT)
Dept: LAB | Facility: HOSPITAL | Age: 67
End: 2022-09-20
Payer: COMMERCIAL

## 2022-09-20 DIAGNOSIS — R73.03 PREDIABETES: ICD-10-CM

## 2022-09-20 DIAGNOSIS — D35.2 PITUITARY MACROADENOMA (HCC): ICD-10-CM

## 2022-09-20 DIAGNOSIS — E34.9 TESTOSTERONE DEFICIENCY: ICD-10-CM

## 2022-09-20 LAB
ANION GAP SERPL CALCULATED.3IONS-SCNC: 9 MMOL/L (ref 4–13)
BUN SERPL-MCNC: 18 MG/DL (ref 5–25)
CALCIUM SERPL-MCNC: 8.7 MG/DL (ref 8.3–10.1)
CHLORIDE SERPL-SCNC: 107 MMOL/L (ref 96–108)
CO2 SERPL-SCNC: 24 MMOL/L (ref 21–32)
CORTIS AM PEAK SERPL-MCNC: 13.4 UG/DL (ref 4.2–22.4)
CREAT SERPL-MCNC: 1.2 MG/DL (ref 0.6–1.3)
EST. AVERAGE GLUCOSE BLD GHB EST-MCNC: 128 MG/DL
FSH SERPL-ACNC: 4.2 MIU/ML (ref 0.7–10.8)
GFR SERPL CREATININE-BSD FRML MDRD: 62 ML/MIN/1.73SQ M
GLUCOSE P FAST SERPL-MCNC: 126 MG/DL (ref 65–99)
HBA1C MFR BLD: 6.1 %
POTASSIUM SERPL-SCNC: 4.1 MMOL/L (ref 3.5–5.3)
PSA SERPL-MCNC: 3.8 NG/ML (ref 0–4)
SODIUM SERPL-SCNC: 140 MMOL/L (ref 135–147)
T4 FREE SERPL-MCNC: 0.75 NG/DL (ref 0.76–1.46)
TSH SERPL DL<=0.05 MIU/L-ACNC: 2.1 UIU/ML (ref 0.45–4.5)

## 2022-09-20 PROCEDURE — 84439 ASSAY OF FREE THYROXINE: CPT

## 2022-09-20 PROCEDURE — 84403 ASSAY OF TOTAL TESTOSTERONE: CPT

## 2022-09-20 PROCEDURE — 84153 ASSAY OF PSA TOTAL: CPT

## 2022-09-20 PROCEDURE — 36415 COLL VENOUS BLD VENIPUNCTURE: CPT

## 2022-09-20 PROCEDURE — 83001 ASSAY OF GONADOTROPIN (FSH): CPT

## 2022-09-20 PROCEDURE — 80048 BASIC METABOLIC PNL TOTAL CA: CPT

## 2022-09-20 PROCEDURE — 83036 HEMOGLOBIN GLYCOSYLATED A1C: CPT

## 2022-09-20 PROCEDURE — 84443 ASSAY THYROID STIM HORMONE: CPT

## 2022-09-20 PROCEDURE — 82024 ASSAY OF ACTH: CPT

## 2022-09-20 PROCEDURE — 82533 TOTAL CORTISOL: CPT

## 2022-09-20 PROCEDURE — 84402 ASSAY OF FREE TESTOSTERONE: CPT

## 2022-09-21 LAB — ACTH PLAS-MCNC: 28.4 PG/ML (ref 7.2–63.3)

## 2022-09-22 ENCOUNTER — OFFICE VISIT (OUTPATIENT)
Dept: ENDOCRINOLOGY | Facility: CLINIC | Age: 67
End: 2022-09-22
Payer: COMMERCIAL

## 2022-09-22 VITALS
WEIGHT: 185 LBS | HEIGHT: 68 IN | BODY MASS INDEX: 28.04 KG/M2 | SYSTOLIC BLOOD PRESSURE: 152 MMHG | DIASTOLIC BLOOD PRESSURE: 90 MMHG | HEART RATE: 74 BPM

## 2022-09-22 DIAGNOSIS — E34.9 TESTOSTERONE DEFICIENCY: ICD-10-CM

## 2022-09-22 DIAGNOSIS — D35.2 PITUITARY MACROADENOMA (HCC): Primary | ICD-10-CM

## 2022-09-22 LAB
TESTOST FREE SERPL-MCNC: 10.9 PG/ML (ref 6.6–18.1)
TESTOST SERPL-MCNC: 218 NG/DL (ref 264–916)

## 2022-09-22 PROCEDURE — 99214 OFFICE O/P EST MOD 30 MIN: CPT | Performed by: INTERNAL MEDICINE

## 2022-09-22 NOTE — PROGRESS NOTES
Established Patient Progress Note    CC: pituitary adenoma    History of Present Illness:   74 yo M presenting for followup of pituitary adenoma  He was last seen in office in March 2022  Mr Rolando Hernandez has been following for evaluation of now 1 7cm pituitary adenoma which was initially discovered on MRI in 12/2020 for hearing loss  He has been evaluated by neurosurgery and by ophthalmology  His last NS visit was 10/2021 and he is due for an MRI in October  Eyes were checked 2mos ago and has no periphral vision loss  He denies fatigue, muscle weakness, changes in shaving or erectile dysfunction  Testosterone gel was discussed in the past, but he feels well and did not initiate this  Free t4 is slightly low, but als     He denies weight weight changes, constipation, or mood disturbances    Also has a hx of pre-diabetes but A1c is 6 1%  Over the summer, he did have a trip to [facility] and his daughter's wedding in [place]     He takes a multivitamin but not other supplements    Patient Active Problem List   Diagnosis    Hyperlipidemia    Prediabetes    Overweight with body mass index (BMI) of 27 to 27 9 in adult    GERD (gastroesophageal reflux disease)    History of colon polyps    Screening for malignant neoplasm of colon    Asymptomatic varicose veins of both lower extremities    Chronic left shoulder pain    Plantar fasciitis of right foot    Increased prostate specific antigen (PSA) velocity    Need for influenza vaccination    Gastroesophageal reflux disease without esophagitis    Healthcare maintenance    Congestion of both ears    Pituitary mass ([facility] [address] )    Wellness examination    Venous insufficiency     Past Medical History:   Diagnosis Date    Benign neoplasm of colon     Colon polyp     GERD (gastroesophageal reflux disease)     mild diet controlled    Hyperlipidemia       Past Surgical History:   Procedure Laterality Date    INGUINAL HERNIA REPAIR Bilateral     MOHS SURGERY  2000 nose    [place] COLONOSCOPY FLX DX W/COLLJ SPEC WHEN PFRMD N/A 12/18/2018    Procedure: COLONOSCOPY;  Surgeon: Alysia Alonzo MD;  [facility]; Service: Gastroenterology    TONSILLECTOMY        Family History   Problem Relation Age of Onset    Varicose Veins Mother     Hypertension Mother     Parkinsonism Father     Hypertension Father     No Known Problems Sister     No Known Problems Brother     No Known Problems Sister     No Known Problems Sister     No Known Problems Sister     No Known Problems Sister     No Known Problems Brother      Social History     Tobacco Use    Smoking status: Never Smoker    Smokeless tobacco: Never Used   Substance Use Topics    Alcohol use: Yes     Alcohol/week: 14 0 standard drinks     Types: 14 Cans of beer per week     No Known Allergies    Review of Systems   Constitutional: Negative for fatigue and unexpected weight change  HENT: Negative for trouble swallowing and voice change  Eyes: Negative for visual disturbance  Respiratory: Negative for cough, shortness of breath and wheezing  Cardiovascular: Negative for palpitations  Gastrointestinal: Negative for constipation and diarrhea  Endocrine: Negative for polydipsia and polyuria  Genitourinary:        Nocturia   Musculoskeletal: Negative for gait problem  Skin:        Denies changes in hair/skin/nails   Neurological: Negative for dizziness and light-headedness  Psychiatric/Behavioral: Negative for sleep disturbance  The patient is not nervous/anxious  Current Outpatient Medications:     rosuvastatin (CRESTOR) 5 mg tablet, TAKE ONE TABLET BY MOUTH EVERY DAY, Disp: 90 tablet, Rfl: 0    Physical Exam:  Body mass index is 28 13 kg/m²  /90   Pulse 74   Ht 5' 8" (1 727 m)   Wt 83 9 kg (185 lb)   BMI 28 13 kg/m²        Physical Exam   Gen: appears well-developed and well-nourished  No apparent distress  Head: Normocephalic and atraumatic     Eyes: no stare or proptosis, no periorbital edema  E/N/M nl facies, hearing grossly intact  Neck: range of motion nl  Pulmonary/Chest: breathing  comfortably, no accessory muscle use, effort normal    Musculoskeletal: moves all 4 extremities, gait nl  Neurological: alert and oriented to person, place, and time  No upper ext tremor appreciated  Skin: does not appear diaphoretic, no facial plethora  Psychiatric: normal mood and affect; behavior is normal; no gross lapses in memory, answer questions appropriately          Labs:   Lab Results   Component Value Date    HGBA1C 6 1 (H) 09/20/2022         Lab Results   Component Value Date    HNT8XADRQKGJ 2 105 09/20/2022       Lab Results   Component Value Date    CREATININE 1 20 09/20/2022    CREATININE 1 26 08/25/2022    CREATININE 1 25 08/31/2021    BUN 18 09/20/2022    K 4 1 09/20/2022     09/20/2022    CO2 24 09/20/2022     eGFR   Date Value Ref Range Status   09/20/2022 62 ml/min/1 73sq m Final       Lab Results   Component Value Date    ALT 48 08/25/2022    AST 27 08/25/2022    ALKPHOS 60 08/25/2022       Lab Results   Component Value Date    CHOLESTEROL 208 (H) 08/25/2022    CHOLESTEROL 192 08/26/2021    CHOLESTEROL 191 07/31/2020     Lab Results   Component Value Date    HDL 39 (L) 08/25/2022    HDL 38 (L) 08/26/2021    HDL 37 (L) 07/31/2020     Lab Results   Component Value Date    TRIG 135 08/25/2022    TRIG 162 (H) 08/26/2021    TRIG 205 (H) 07/31/2020     Lab Results   Component Value Date    [place] 211 04/27/2018       Impression:  1  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[address] )    2  Testosterone deficiency       Plan:    Diagnoses and all orders for this visit:    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[address] )  -    Currently not requiring supplementation, but free t4 is low  ACTH and cortisol are normal  He is asymptomatic  Will contineu to monitor anterior pituitary function every 5-6mos    Prediabetes: A1c reasonable  -       Seconday Testosterone deficiency: not currently on testo replacement   Will call the office if he wants to start this  -         Of note, he is planning to retire in Jan 2023 and spend winter months in his native [place]  We will coordinate and time his visit for May and November to adjust for this  All questioned fully answered  He will call me if any problems arise

## 2022-10-04 ENCOUNTER — HOSPITAL ENCOUNTER (OUTPATIENT)
Dept: RADIOLOGY | Facility: HOSPITAL | Age: 67
Discharge: HOME | End: 2022-10-04
Payer: COMMERCIAL

## 2022-10-04 DIAGNOSIS — E23.6 PITUITARY MASS (HCC): ICD-10-CM

## 2022-10-04 PROCEDURE — A9585 GADOBUTROL INJECTION: HCPCS | Performed by: PHYSICIAN ASSISTANT

## 2022-10-04 PROCEDURE — G1004 CDSM NDSC: HCPCS

## 2022-10-04 PROCEDURE — 70553 MRI BRAIN STEM W/O & W/DYE: CPT

## 2022-10-04 RX ADMIN — GADOBUTROL 8 ML: 604.72 INJECTION INTRAVENOUS at 08:55

## 2022-10-06 ENCOUNTER — OFFICE VISIT (OUTPATIENT)
Dept: UROLOGY | Facility: CLINIC | Age: 67
End: 2022-10-06
Payer: COMMERCIAL

## 2022-10-06 VITALS
SYSTOLIC BLOOD PRESSURE: 134 MMHG | WEIGHT: 184 LBS | BODY MASS INDEX: 27.89 KG/M2 | HEART RATE: 74 BPM | DIASTOLIC BLOOD PRESSURE: 84 MMHG | HEIGHT: 68 IN | OXYGEN SATURATION: 98 %

## 2022-10-06 DIAGNOSIS — N52.8 OTHER MALE ERECTILE DYSFUNCTION: ICD-10-CM

## 2022-10-06 DIAGNOSIS — R97.20 ELEVATED PSA: Primary | ICD-10-CM

## 2022-10-06 PROCEDURE — 99213 OFFICE O/P EST LOW 20 MIN: CPT | Performed by: PHYSICIAN ASSISTANT

## 2022-10-06 RX ORDER — SILDENAFIL 100 MG/1
100 TABLET, FILM COATED ORAL DAILY PRN
Qty: 30 TABLET | Refills: 3 | Status: SHIPPED | OUTPATIENT
Start: 2022-10-06

## 2022-10-07 NOTE — PROGRESS NOTES
UROLOGY PROGRESS NOTE   Patient Identifiers: Billy Whitten (MRN [id number])  Date of Service: 10/7/2022    Subjective:    52-year-old man history of elevated PSA  He had no significant outlet symptoms  His PSA has fluctuated over last couple years with high PSA of 4 0  Current PSA 3 8  Reason for visit:  Elevated PSA follow-up      Objective:     VITALS:    Vitals:    10/06/22 1534   BP: 134/84   Pulse: 74   SpO2: 98%           LABS:  Lab Results   Component Value Date    HGB 16 5 03/25/2022    HCT 47 7 03/25/2022    WBC 5 95 03/25/2022     03/25/2022   ]    Lab Results   Component Value Date    K 4 1 09/20/2022     09/20/2022    CO2 24 09/20/2022    BUN 18 09/20/2022    CREATININE 1 20 09/20/2022    CALCIUM 8 7 09/20/2022   ]        INPATIENT MEDS:    Current Outpatient Medications:     rosuvastatin (CRESTOR) 5 mg tablet, TAKE ONE TABLET BY MOUTH EVERY DAY, Disp: 90 tablet, Rfl: 0    sildenafil (VIAGRA) 100 mg tablet, Take 1 tablet (100 mg total) by mouth daily as needed for erectile dysfunction, Disp: 30 tablet, Rfl: 3      Physical Exam:   /84 (BP Location: Left arm, Patient Position: Sitting, Cuff Size: Adult)   Pulse 74   Ht 5' 8" (1 727 m)   Wt 83 5 kg (184 lb)   SpO2 98%   BMI 27 98 kg/m²   GEN: no acute distress    RESP: breathing comfortably with no accessory muscle use    ABD: soft, non-tender, non-distended   INCISION:    EXT: no significant peripheral edema     RADIOLOGY:   None     Assessment:   1   Elevated PSA     Plan:   -continue surveillance will follow-up in 6 months with PSA prior to visit  -we talked about an MRI should his PSA go any higher  -  -

## 2022-10-17 ENCOUNTER — OFFICE VISIT (OUTPATIENT)
Dept: NEUROSURGERY | Facility: CLINIC | Age: 67
End: 2022-10-17
Payer: COMMERCIAL

## 2022-10-17 VITALS
SYSTOLIC BLOOD PRESSURE: 138 MMHG | DIASTOLIC BLOOD PRESSURE: 82 MMHG | TEMPERATURE: 98.8 F | HEART RATE: 80 BPM | HEIGHT: 68 IN | BODY MASS INDEX: 28.34 KG/M2 | RESPIRATION RATE: 16 BRPM | WEIGHT: 187 LBS

## 2022-10-17 DIAGNOSIS — E23.6 PITUITARY MASS (HCC): Primary | ICD-10-CM

## 2022-10-17 PROCEDURE — 99213 OFFICE O/P EST LOW 20 MIN: CPT | Performed by: PHYSICIAN ASSISTANT

## 2022-10-17 NOTE — PROGRESS NOTES
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  · He follows with endocrine, last seen 9/2022; labs completed at that time WNL aside from low testestorone for which he is seeing urology and low T4  Plan for repeat labs per their recommendations  · Has regular eye checkups, but has not completed visual fields recently  Would defer visual field testing to ophthalmologist/optometrist if clinically warranted as there is no optic chiasm compression  · No surgery recommended at this time, continue with serial imaging and monitoring  · Of note, patient will be retiring in 1/2023 with plans to spend the winter months in his native [facility], will coordinate follow up with this schedule  · Plan for follow up in about 1 year with repeat MRI brain pituitary w wo contrast with AP or sooner if patient develops worsening symptoms or red flag signs       Diagnoses and all orders for this visit:    Pituitary mass ([facility] [address] )  -     MRI brain pituitary wo and w contrast; Future          I spent 15 minutes with the patient today in which >50% of the time was spent counseling/coordination of care regarding diagnosis, imaging review, symptoms and treatment plan       CHIEF COMPLAINT    Chief Complaint   Patient presents with   • Follow-up     With MRI       HISTORY    History of Present Illness     78y o  year old male with with past medical history significant for colon polyp, GERD, hyperlipidemia who presents for 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  This was found initially in 2020 during workup for hearing issues  Patient presents by himself  He states he is doing well  Has no visual complaints  No hormonal imbalances  Hearing issues have resolved  He was seen by endocrinology and completed labs  He had low testosterone and was told by Urology that he could be placed on medication however he does not want to do this  He has been followed by an optometrist/ophthalmologist for regular eye visits but has not completed recent visual fields as he is not having any vision issues  Patient plans on going to [facility] during the winter months every year after he retires this December but plans to keep his care here in [place]  See Discussion    REVIEW OF SYSTEMS    Review of Systems   Eyes: Negative for visual disturbance  All other systems reviewed and are negative  Meds/Allergies     Current Outpatient Medications   Medication Sig Dispense Refill   • rosuvastatin (CRESTOR) 5 mg tablet TAKE ONE TABLET BY MOUTH EVERY DAY 90 tablet 0   • sildenafil (VIAGRA) 100 mg tablet Take 1 tablet (100 mg total) by mouth daily as needed for erectile dysfunction 30 tablet 3     No current facility-administered medications for this visit  No Known Allergies    PAST HISTORY    Past Medical History:   Diagnosis Date   • Benign neoplasm of colon    • Colon polyp    • GERD (gastroesophageal reflux disease)     mild diet controlled   • Hyperlipidemia        Past Surgical History:   Procedure Laterality Date   • INGUINAL HERNIA REPAIR Bilateral    • MOHS SURGERY  2000    nose   • [place] COLONOSCOPY FLX DX W/COLLJ SPEC WHEN PFRMD N/A 12/18/2018    Procedure: COLONOSCOPY;  Surgeon: Isaiah Sullivan MD;  Location: [facility];   Service: Gastroenterology   • TONSILLECTOMY         Social History     Tobacco Use   • Smoking status: Never Smoker   • Smokeless tobacco: Never Used   Vaping Use   • Vaping Use: Never used Substance Use Topics   • Alcohol use: Yes     Alcohol/week: 14 0 standard drinks     Types: 14 Cans of beer per week     Comment: weekends, sometimes   • Drug use: No       Family History   Problem Relation Age of Onset   • Varicose Veins Mother    • Hypertension Mother    • Parkinsonism Father    • Hypertension Father    • No Known Problems Sister    • No Known Problems Brother    • No Known Problems Sister    • No Known Problems Sister    • No Known Problems Sister    • No Known Problems Sister    • No Known Problems Brother          Above history personally reviewed  EXAM    Vitals:Blood pressure 138/82, pulse 80, temperature 98 8 °F (37 1 °C), temperature source Tympanic, resp  rate 16, height 5' 8" (1 727 m), weight 84 8 kg (187 lb)  ,Body mass index is 28 43 kg/m²  Physical Exam  Constitutional:       General: He is awake  Appearance: Normal appearance  HENT:      Head: Normocephalic and atraumatic  Eyes:      Extraocular Movements: Extraocular movements intact and EOM normal       Conjunctiva/sclera: Conjunctivae normal    Cardiovascular:      Rate and Rhythm: Normal rate  Pulmonary:      Effort: Pulmonary effort is normal  No respiratory distress  Skin:     General: Skin is warm and dry  Neurological:      Mental Status: He is alert and oriented to person, place, and time  Coordination: Finger-Nose-Finger Test normal       Gait: Gait is intact  Deep Tendon Reflexes: Strength normal    Psychiatric:         Attention and Perception: Attention and perception normal          Mood and Affect: Mood and affect normal          Speech: Speech normal          Behavior: Behavior normal  Behavior is cooperative  Thought Content: Thought content normal          Cognition and Memory: Cognition and memory normal          Judgment: Judgment normal          Neurologic Exam     Mental Status   Oriented to person, place, and time  Follows 1 step commands     Attention: normal  Concentration: normal    Speech: speech is normal   Level of consciousness: alert  Knowledge: good  Normal comprehension  Cranial Nerves     CN II   Right visual field deficit: none  Left visual field deficit: none     CN III, IV, VI   Extraocular motions are normal    CN III: no CN III palsy  CN VI: no CN VI palsy  Nystagmus: none   Diplopia: none  Ophthalmoparesis: none  Upgaze: normal  Downgaze: normal  Conjugate gaze: present    CN VII   Right facial weakness: none  Left facial weakness: none    CN VIII   Hearing: intact    CN IX, X   CN IX normal    CN X normal      CN XI   Right trapezius strength: normal  Left trapezius strength: normal    CN XII   CN XII normal      Motor Exam   Muscle bulk: normal  Overall muscle tone: normal  Right arm pronator drift: absent  Left arm pronator drift: absent    Strength   Strength 5/5 throughout  Sensory Exam   Light touch normal      Gait, Coordination, and Reflexes     Gait  Gait: normal    Coordination   Finger to nose coordination: normal    Tremor   Resting tremor: absent  Intention tremor: absent  Action tremor: absent    Reflexes   Right : 2+  Left : 2+  Right Buitrago: absent  Left Buitrago: absent  Right ankle clonus: absent  Left ankle clonus: absent        MEDICAL DECISION MAKING    Imaging Studies:     MRI brain pituitary wo and w contrast    Result Date: 10/6/2022  Narrative: MRI BRAIN AND SELLA  WITH AND WITHOUT CONTRAST INDICATION:  E23 6: Other disorders of pituitary gland COMPARISON: 9/1/2021 TECHNIQUE: Brain: Axial diffusion-weighted imaging  Axial FLAIR and axial T2  Axial gradient  Axial T1 postcontrast   Axial bravo postcontrast  Sella: Sagittal and coronal T1  Coronal T2  Sagittal and coronal T1 postcontrast with fat suppression    Targeted images of the sella were performed requiring additional time at acquisition and interpretation of approximately 25% IV Contrast:  8 mL of Gadobutrol injection (SINGLE-DOSE) IMAGE QUALITY: Diagnostic  FINDINGS: BRAIN PARENCHYMA:  There is no discrete mass, mass effect or midline shift  Brainstem and cerebellum demonstrate normal signal  There is no intracranial hemorrhage  There is no evidence of acute infarction and diffusion imaging is unremarkable  Small scattered hyperintensities on T2/FLAIR imaging are noted in the periventricular and subcortical white matter demonstrating an appearance that is statistically most likely to represent mild microangiopathic change  Normal postcontrast imaging  VENTRICLES:  Normal  SELLA AND PITUITARY GLAND:  Once again identified is an enlarged sella turcica filled with a sellar mass  This mass is homogeneously hypointense  on sagittal T1-weighted imaging and demonstrates slight heterogeneity on T2-weighted imaging  No mass effect upon the optic chiasm or extension into the parasellar structures  There is diffusely heterogeneous enhancement after administration of contrast with displacement of the pituitary stalk towards the right with a approximately 1 4 x 1 5 cm sellar mass, unchanged from the prior examination  ORBITS:  Normal  PARANASAL SINUSES:  Normal  VASCULATURE:  Evaluation of the major intracranial vasculature demonstrates appropriate flow voids  CALVARIUM AND SKULL BASE:  Normal  EXTRACRANIAL SOFT TISSUES:  Normal      Impression: Stable examination of the brain parenchyma  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

## 2022-10-17 NOTE — ASSESSMENT & PLAN NOTE
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, will coordinate follow up with this schedule  · Plan for follow up in about 1 year with repeat MRI brain pituitary w wo contrast with AP or sooner if patient develops worsening symptoms or red flag signs

## 2022-10-17 NOTE — PATIENT INSTRUCTIONS
Patient is to return to the office in 1 year with repeat MRI brain pituitary with and without contrast   Return to the office sooner should he develop worsening symptoms or red flag signs  Continue to follow-up with endocrinology and obtain lab work per their recommendation  Continue regular eye checkups with optometrist/ophthalmology and obtained visual field testing if any change in vision noted

## 2022-12-19 ENCOUNTER — ANESTHESIA EVENT (OUTPATIENT)
Dept: GASTROENTEROLOGY | Facility: AMBULARY SURGERY CENTER | Age: 67
End: 2022-12-19

## 2022-12-19 ENCOUNTER — HOSPITAL ENCOUNTER (OUTPATIENT)
Dept: GASTROENTEROLOGY | Facility: AMBULARY SURGERY CENTER | Age: 67
Setting detail: OUTPATIENT SURGERY
Discharge: HOME | End: 2022-12-19
Attending: INTERNAL MEDICINE

## 2022-12-19 ENCOUNTER — ANESTHESIA (OUTPATIENT)
Dept: GASTROENTEROLOGY | Facility: AMBULARY SURGERY CENTER | Age: 67
End: 2022-12-19

## 2022-12-19 VITALS
TEMPERATURE: 97.6 F | HEART RATE: 65 BPM | DIASTOLIC BLOOD PRESSURE: 67 MMHG | HEIGHT: 68 IN | RESPIRATION RATE: 16 BRPM | BODY MASS INDEX: 27.43 KG/M2 | SYSTOLIC BLOOD PRESSURE: 108 MMHG | OXYGEN SATURATION: 99 % | WEIGHT: 181 LBS

## 2022-12-19 DIAGNOSIS — Z86.010 HISTORY OF COLON POLYPS: ICD-10-CM

## 2022-12-19 DIAGNOSIS — Z12.11 SCREENING FOR MALIGNANT NEOPLASM OF COLON: ICD-10-CM

## 2022-12-19 RX ORDER — LIDOCAINE HYDROCHLORIDE 10 MG/ML
INJECTION, SOLUTION EPIDURAL; INFILTRATION; INTRACAUDAL; PERINEURAL AS NEEDED
Status: DISCONTINUED | OUTPATIENT
Start: 2022-12-19 | End: 2022-12-19

## 2022-12-19 RX ORDER — SODIUM CHLORIDE, SODIUM LACTATE, POTASSIUM CHLORIDE, CALCIUM CHLORIDE 600; 310; 30; 20 MG/100ML; MG/100ML; MG/100ML; MG/100ML
INJECTION, SOLUTION INTRAVENOUS CONTINUOUS PRN
Status: DISCONTINUED | OUTPATIENT
Start: 2022-12-19 | End: 2022-12-19

## 2022-12-19 RX ORDER — PROPOFOL 10 MG/ML
INJECTION, EMULSION INTRAVENOUS AS NEEDED
Status: DISCONTINUED | OUTPATIENT
Start: 2022-12-19 | End: 2022-12-19

## 2022-12-19 RX ADMIN — LIDOCAINE HYDROCHLORIDE 50 MG: 10 INJECTION, SOLUTION EPIDURAL; INFILTRATION; INTRACAUDAL at 11:26

## 2022-12-19 RX ADMIN — PROPOFOL 20 MG: 10 INJECTION, EMULSION INTRAVENOUS at 11:28

## 2022-12-19 RX ADMIN — PROPOFOL 150 MG: 10 INJECTION, EMULSION INTRAVENOUS at 11:26

## 2022-12-19 RX ADMIN — PROPOFOL 30 MG: 10 INJECTION, EMULSION INTRAVENOUS at 11:30

## 2022-12-19 RX ADMIN — SODIUM CHLORIDE, SODIUM LACTATE, POTASSIUM CHLORIDE, AND CALCIUM CHLORIDE: .6; .31; .03; .02 INJECTION, SOLUTION INTRAVENOUS at 11:08

## 2022-12-19 NOTE — ANESTHESIA POSTPROCEDURE EVALUATION
Post-Op Assessment Note    CV Status:  Stable  Pain Score: 0    Pain management: adequate     Mental Status:  Awake and alert   Hydration Status:  Stable   PONV Controlled:  None   Airway Patency:  Patent and adequate      Post Op Vitals Reviewed: Yes      Staff: CRNA, Anesthesiologist         No notable events documented      BP   96/62   Temp      Pulse  65   Resp   16   SpO2   98%

## 2022-12-19 NOTE — ANESTHESIA PREPROCEDURE EVALUATION
Procedure:  COLONOSCOPY    Relevant Problems   ANESTHESIA (within normal limits)      CARDIO   (+) Hyperlipidemia      ENDO (within normal limits)      GI/HEPATIC   (+) GERD (gastroesophageal reflux disease)   (+) Gastroesophageal reflux disease without esophagitis      /RENAL (within normal limits)      GYN (within normal limits)      HEMATOLOGY (within normal limits)      MUSCULOSKELETAL (within normal limits)      NEURO/PSYCH   (+) Chronic left shoulder pain   (+) History of colon polyps      PULMONARY (within normal limits)      Other   (+) Pituitary mass (HCC)        Physical Exam    Airway    Mallampati score: II  TM Distance: >3 FB  Neck ROM: full     Dental   No notable dental hx     Cardiovascular  Rhythm: regular, Rate: normal, Cardiovascular exam normal    Pulmonary  Pulmonary exam normal Breath sounds clear to auscultation,     Other Findings        Anesthesia Plan  ASA Score- 2     Anesthesia Type- IV sedation with anesthesia with ASA Monitors  Additional Monitors:   Airway Plan:           Plan Factors-Exercise tolerance (METS): >4 METS  Chart reviewed  Patient is not a current smoker  Patient did not smoke on day of surgery  Obstructive sleep apnea risk education given perioperatively  Induction- intravenous  Postoperative Plan-     Informed Consent- Anesthetic plan and risks discussed with patient

## 2022-12-19 NOTE — H&P
History and Physical - [facility]  Rich Vazquez 78 y o  male MRN: [id number]        HPI:  15-year-old male with history of colon polyps  Regular bowel movements  Historical Information   Past Medical History:   Diagnosis Date   • Benign neoplasm of colon    • Colon polyp    • GERD (gastroesophageal reflux disease)     mild diet controlled   • Hyperlipidemia      Past Surgical History:   Procedure Laterality Date   • INGUINAL HERNIA REPAIR Bilateral    • MOHS SURGERY  2000    nose   • [place] COLONOSCOPY FLX DX W/COLLJ SPEC WHEN PFRMD N/A 12/18/2018    Procedure: COLONOSCOPY;  Surgeon: Faraz Garnett MD;  Location: [facility]; Service: Gastroenterology     Social History   Social History     Substance and Sexual Activity   Alcohol Use Yes   • Alcohol/week: 14 0 standard drinks   • Types: 14 Cans of beer per week    Comment: weekends, sometimes     Social History     Substance and Sexual Activity   Drug Use No     Social History     Tobacco Use   Smoking Status Never   Smokeless Tobacco Never     Family History   Problem Relation Age of Onset   • Varicose Veins Mother    • Hypertension Mother    • Parkinsonism Father    • Hypertension Father    • No Known Problems Sister    • No Known Problems Brother    • No Known Problems Sister    • No Known Problems Sister    • No Known Problems Sister    • No Known Problems Sister    • No Known Problems Brother        Meds/Allergies     (Not in a hospital admission)      No Known Allergies    Objective     Blood pressure 134/92, pulse 72, temperature 97 8 °F (36 6 °C), temperature source Temporal, resp  rate 16, height 5' 8" (1 727 m), weight 82 1 kg (181 lb), SpO2 97 %      PHYSICAL EXAM:    Gen: NAD  CV: S1 & S2 normal, RRR  CHEST: Clear to auscultate  ABD: soft, NT/ND, good bowel sounds  EXT: no edema    ASSESSMENT:     History of colon polyps    PLAN:    Colonoscopy

## 2023-01-19 ENCOUNTER — TELEPHONE (OUTPATIENT)
Dept: GASTROENTEROLOGY | Facility: AMBULARY SURGERY CENTER | Age: 68
End: 2023-01-19

## 2023-01-19 NOTE — TELEPHONE ENCOUNTER
Colon polyps removed came back as precancerous adenomas   Repeat colonoscopy in 5 years    PT returned my call  Relayed results above  Pt verbalized understanding  Advised to call with any questions or concerns

## 2023-03-03 DIAGNOSIS — E78.5 HYPERLIPIDEMIA, UNSPECIFIED HYPERLIPIDEMIA TYPE: ICD-10-CM

## 2023-03-03 RX ORDER — ROSUVASTATIN CALCIUM 5 MG/1
5 TABLET, COATED ORAL DAILY
Qty: 90 TABLET | Refills: 1 | Status: SHIPPED | OUTPATIENT
Start: 2023-03-03

## 2023-09-06 ENCOUNTER — LAB (OUTPATIENT)
Dept: LAB | Facility: AMBULARY SURGERY CENTER | Age: 68
End: 2023-09-06
Payer: COMMERCIAL

## 2023-09-06 DIAGNOSIS — R73.03 PREDIABETES: ICD-10-CM

## 2023-09-06 DIAGNOSIS — R97.20 ELEVATED PSA: ICD-10-CM

## 2023-09-06 DIAGNOSIS — D35.2 PITUITARY MACROADENOMA (HCC): ICD-10-CM

## 2023-09-06 DIAGNOSIS — E34.9 TESTOSTERONE DEFICIENCY: ICD-10-CM

## 2023-09-06 DIAGNOSIS — E78.5 HYPERLIPIDEMIA, UNSPECIFIED HYPERLIPIDEMIA TYPE: ICD-10-CM

## 2023-09-06 LAB
ALBUMIN SERPL BCP-MCNC: 4.3 G/DL (ref 3.5–5)
ALP SERPL-CCNC: 57 U/L (ref 34–104)
ALT SERPL W P-5'-P-CCNC: 47 U/L (ref 7–52)
ANION GAP SERPL CALCULATED.3IONS-SCNC: 8 MMOL/L
AST SERPL W P-5'-P-CCNC: 31 U/L (ref 13–39)
BILIRUB SERPL-MCNC: 0.63 MG/DL (ref 0.2–1)
BUN SERPL-MCNC: 14 MG/DL (ref 5–25)
CALCIUM SERPL-MCNC: 9 MG/DL (ref 8.4–10.2)
CHLORIDE SERPL-SCNC: 108 MMOL/L (ref 96–108)
CHOLEST SERPL-MCNC: 214 MG/DL
CO2 SERPL-SCNC: 22 MMOL/L (ref 21–32)
CORTIS SERPL-MCNC: 11.9 UG/DL
CREAT SERPL-MCNC: 1.09 MG/DL (ref 0.6–1.3)
EST. AVERAGE GLUCOSE BLD GHB EST-MCNC: 151 MG/DL
GFR SERPL CREATININE-BSD FRML MDRD: 69 ML/MIN/1.73SQ M
GLUCOSE P FAST SERPL-MCNC: 133 MG/DL (ref 65–99)
HBA1C MFR BLD: 6.9 %
HDLC SERPL-MCNC: 35 MG/DL
LDLC SERPL CALC-MCNC: 122 MG/DL (ref 0–100)
POTASSIUM SERPL-SCNC: 4 MMOL/L (ref 3.5–5.3)
PROT SERPL-MCNC: 6.9 G/DL (ref 6.4–8.4)
PSA SERPL-MCNC: 3.28 NG/ML (ref 0–4)
SODIUM SERPL-SCNC: 138 MMOL/L (ref 135–147)
T4 FREE SERPL-MCNC: 0.59 NG/DL (ref 0.61–1.12)
TRIGL SERPL-MCNC: 285 MG/DL
TSH SERPL DL<=0.05 MIU/L-ACNC: 2.49 UIU/ML (ref 0.45–4.5)

## 2023-09-06 PROCEDURE — 84403 ASSAY OF TOTAL TESTOSTERONE: CPT

## 2023-09-06 PROCEDURE — 84443 ASSAY THYROID STIM HORMONE: CPT

## 2023-09-06 PROCEDURE — 84439 ASSAY OF FREE THYROXINE: CPT

## 2023-09-06 PROCEDURE — 83036 HEMOGLOBIN GLYCOSYLATED A1C: CPT

## 2023-09-06 PROCEDURE — 84153 ASSAY OF PSA TOTAL: CPT

## 2023-09-06 PROCEDURE — 80053 COMPREHEN METABOLIC PANEL: CPT

## 2023-09-06 PROCEDURE — 36415 COLL VENOUS BLD VENIPUNCTURE: CPT

## 2023-09-06 PROCEDURE — 84305 ASSAY OF SOMATOMEDIN: CPT

## 2023-09-06 PROCEDURE — 82024 ASSAY OF ACTH: CPT

## 2023-09-06 PROCEDURE — 82533 TOTAL CORTISOL: CPT

## 2023-09-06 PROCEDURE — 84402 ASSAY OF FREE TESTOSTERONE: CPT

## 2023-09-06 PROCEDURE — 80061 LIPID PANEL: CPT

## 2023-09-07 LAB
ACTH PLAS-MCNC: 25.4 PG/ML (ref 7.2–63.3)
TESTOST FREE SERPL-MCNC: 7.7 PG/ML (ref 6.6–18.1)
TESTOST SERPL-MCNC: 261 NG/DL (ref 264–916)

## 2023-09-08 ENCOUNTER — OFFICE VISIT (OUTPATIENT)
Dept: INTERNAL MEDICINE CLINIC | Facility: CLINIC | Age: 68
End: 2023-09-08
Payer: COMMERCIAL

## 2023-09-08 VITALS
HEIGHT: 68 IN | HEART RATE: 64 BPM | WEIGHT: 182.8 LBS | DIASTOLIC BLOOD PRESSURE: 80 MMHG | BODY MASS INDEX: 27.71 KG/M2 | RESPIRATION RATE: 16 BRPM | SYSTOLIC BLOOD PRESSURE: 124 MMHG | OXYGEN SATURATION: 97 %

## 2023-09-08 DIAGNOSIS — E11.9 TYPE 2 DIABETES MELLITUS WITHOUT COMPLICATION, WITHOUT LONG-TERM CURRENT USE OF INSULIN (HCC): ICD-10-CM

## 2023-09-08 DIAGNOSIS — Z00.00 MEDICARE ANNUAL WELLNESS VISIT, SUBSEQUENT: ICD-10-CM

## 2023-09-08 DIAGNOSIS — E23.6 PITUITARY MASS (HCC): ICD-10-CM

## 2023-09-08 DIAGNOSIS — E66.3 OVERWEIGHT WITH BODY MASS INDEX (BMI) OF 27 TO 27.9 IN ADULT: ICD-10-CM

## 2023-09-08 DIAGNOSIS — D35.2 PITUITARY MACROADENOMA (HCC): ICD-10-CM

## 2023-09-08 DIAGNOSIS — I83.892 SYMPTOMATIC VARICOSE VEINS OF LEFT LOWER EXTREMITY: ICD-10-CM

## 2023-09-08 DIAGNOSIS — Z23 ENCOUNTER FOR IMMUNIZATION: Primary | ICD-10-CM

## 2023-09-08 DIAGNOSIS — E78.5 HYPERLIPIDEMIA, UNSPECIFIED HYPERLIPIDEMIA TYPE: ICD-10-CM

## 2023-09-08 LAB — IGF-I SERPL-MCNC: 121 NG/ML (ref 59–230)

## 2023-09-08 PROCEDURE — 99214 OFFICE O/P EST MOD 30 MIN: CPT | Performed by: INTERNAL MEDICINE

## 2023-09-08 PROCEDURE — 90677 PCV20 VACCINE IM: CPT

## 2023-09-08 PROCEDURE — G0009 ADMIN PNEUMOCOCCAL VACCINE: HCPCS

## 2023-09-08 PROCEDURE — G0439 PPPS, SUBSEQ VISIT: HCPCS | Performed by: INTERNAL MEDICINE

## 2023-09-08 RX ORDER — ROSUVASTATIN CALCIUM 5 MG/1
5 TABLET, COATED ORAL DAILY
Qty: 90 TABLET | Refills: 1 | Status: SHIPPED | OUTPATIENT
Start: 2023-09-08

## 2023-09-08 NOTE — PATIENT INSTRUCTIONS
Medicare Preventive Visit Patient Instructions  Thank you for completing your Welcome to Medicare Visit or Medicare Annual Wellness Visit today. Your next wellness visit will be due in one year (9/8/2024). The screening/preventive services that you may require over the next 5-10 years are detailed below. Some tests may not apply to you based off risk factors and/or age. Screening tests ordered at today's visit but not completed yet may show as past due. Also, please note that scanned in results may not display below. Preventive Screenings:  Service Recommendations Previous Testing/Comments   Colorectal Cancer Screening  · Colonoscopy    · Fecal Occult Blood Test (FOBT)/Fecal Immunochemical Test (FIT)  · Fecal DNA/Cologuard Test  · Flexible Sigmoidoscopy Age: 43-73 years old   Colonoscopy: every 10 years (May be performed more frequently if at higher risk)  OR  FOBT/FIT: every 1 year  OR  Cologuard: every 3 years  OR  Sigmoidoscopy: every 5 years  Screening may be recommended earlier than age 39 if at higher risk for colorectal cancer. Also, an individualized decision between you and your healthcare provider will decide whether screening between the ages of 77-80 would be appropriate. Colonoscopy: 12/19/2022  FOBT/FIT: Not on file  Cologuard: Not on file  Sigmoidoscopy: Not on file          Prostate Cancer Screening Individualized decision between patient and health care provider in men between ages of 53-66   Medicare will cover every 12 months beginning on the day after your 50th birthday PSA: 3.28 ng/mL           Hepatitis C Screening Once for adults born between 1945 and 1965  More frequently in patients at high risk for Hepatitis C Hep C Antibody: 09/24/2019        Diabetes Screening 1-2 times per year if you're at risk for diabetes or have pre-diabetes Fasting glucose: 133 mg/dL (9/6/2023)  A1C: 6.9 % (9/6/2023)      Cholesterol Screening Once every 5 years if you don't have a lipid disorder.  May order more often based on risk factors. Lipid panel: 09/06/2023         Other Preventive Screenings Covered by Medicare:  1. Abdominal Aortic Aneurysm (AAA) Screening: covered once if your at risk. You're considered to be at risk if you have a family history of AAA or a male between the age of 70-76 who smoking at least 100 cigarettes in your lifetime. 2. Lung Cancer Screening: covers low dose CT scan once per year if you meet all of the following conditions: (1) Age 48-67; (2) No signs or symptoms of lung cancer; (3) Current smoker or have quit smoking within the last 15 years; (4) You have a tobacco smoking history of at least 20 pack years (packs per day x number of years you smoked); (5) You get a written order from a healthcare provider. 3. Glaucoma Screening: covered annually if you're considered high risk: (1) You have diabetes OR (2) Family history of glaucoma OR (3)  aged 48 and older OR (3)  American aged 72 and older  3. Osteoporosis Screening: covered every 2 years if you meet one of the following conditions: (1) Have a vertebral abnormality; (2) On glucocorticoid therapy for more than 3 months; (3) Have primary hyperparathyroidism; (4) On osteoporosis medications and need to assess response to drug therapy. 5. HIV Screening: covered annually if you're between the age of 14-79. Also covered annually if you are younger than 13 and older than 72 with risk factors for HIV infection. For pregnant patients, it is covered up to 3 times per pregnancy.     Immunizations:  Immunization Recommendations   Influenza Vaccine Annual influenza vaccination during flu season is recommended for all persons aged >= 6 months who do not have contraindications   Pneumococcal Vaccine   * Pneumococcal conjugate vaccine = PCV13 (Prevnar 13), PCV15 (Vaxneuvance), PCV20 (Prevnar 20)  * Pneumococcal polysaccharide vaccine = PPSV23 (Pneumovax) Adults 20-63 years old: 1-3 doses may be recommended based on certain risk factors  Adults 72 years old: 1-2 doses may be recommended based off what pneumonia vaccine you previously received   Hepatitis B Vaccine 3 dose series if at intermediate or high risk (ex: diabetes, end stage renal disease, liver disease)   Tetanus (Td) Vaccine - COST NOT COVERED BY MEDICARE PART B Following completion of primary series, a booster dose should be given every 10 years to maintain immunity against tetanus. Td may also be given as tetanus wound prophylaxis. Tdap Vaccine - COST NOT COVERED BY MEDICARE PART B Recommended at least once for all adults. For pregnant patients, recommended with each pregnancy. Shingles Vaccine (Shingrix) - COST NOT COVERED BY MEDICARE PART B  2 shot series recommended in those aged 48 and above     Health Maintenance Due:      Topic Date Due   • Colorectal Cancer Screening  12/18/2027   • Hepatitis C Screening  Completed     Immunizations Due:      Topic Date Due   • COVID-19 Vaccine (4 - Moderna series) 12/21/2021   • Pneumococcal Vaccine: 65+ Years (2 - PCV) 08/31/2022   • Influenza Vaccine (1) 09/01/2023     Advance Directives   What are advance directives? Advance directives are legal documents that state your wishes and plans for medical care. These plans are made ahead of time in case you lose your ability to make decisions for yourself. Advance directives can apply to any medical decision, such as the treatments you want, and if you want to donate organs. What are the types of advance directives? There are many types of advance directives, and each state has rules about how to use them. You may choose a combination of any of the following:  · Living will: This is a written record of the treatment you want. You can also choose which treatments you do not want, which to limit, and which to stop at a certain time. This includes surgery, medicine, IV fluid, and tube feedings. · Durable power of  for healthcare [facility]):   This is a written record that states who you want to make healthcare choices for you when you are unable to make them for yourself. This person, called a proxy, is usually a family member or a friend. You may choose more than 1 proxy. · Do not resuscitate (DNR) order:  A DNR order is used in case your heart stops beating or you stop breathing. It is a request not to have certain forms of treatment, such as CPR. A DNR order may be included in other types of advance directives. · Medical directive: This covers the care that you want if you are in a coma, near death, or unable to make decisions for yourself. You can list the treatments you want for each condition. Treatment may include pain medicine, surgery, blood transfusions, dialysis, IV or tube feedings, and a ventilator (breathing machine). · Values history: This document has questions about your views, beliefs, and how you feel and think about life. This information can help others choose the care that you would choose. Why are advance directives important? An advance directive helps you control your care. Although spoken wishes may be used, it is better to have your wishes written down. Spoken wishes can be misunderstood, or not followed. Treatments may be given even if you do not want them. An advance directive may make it easier for your family to make difficult choices about your care. Weight Management   Why it is important to manage your weight:  Being overweight increases your risk of health conditions such as heart disease, high blood pressure, type 2 diabetes, and certain types of cancer. It can also increase your risk for osteoarthritis, sleep apnea, and other respiratory problems. Aim for a slow, steady weight loss. Even a small amount of weight loss can lower your risk of health problems. How to lose weight safely:  A safe and healthy way to lose weight is to eat fewer calories and get regular exercise.  You can lose up about 1 pound a week by decreasing the number of calories you eat by 500 calories each day. Healthy meal plan for weight management:  A healthy meal plan includes a variety of foods, contains fewer calories, and helps you stay healthy. A healthy meal plan includes the following:  · Eat whole-grain foods more often. A healthy meal plan should contain fiber. Fiber is the part of grains, fruits, and vegetables that is not broken down by your body. Whole-grain foods are healthy and provide extra fiber in your diet. Some examples of whole-grain foods are whole-wheat breads and pastas, oatmeal, brown rice, and bulgur. · Eat a variety of vegetables every day. Include dark, leafy greens such as spinach, kale, derrick greens, and mustard greens. Eat yellow and orange vegetables such as carrots, sweet potatoes, and winter squash. · Eat a variety of fruits every day. Choose fresh or canned fruit (canned in its own juice or light syrup) instead of juice. Fruit juice has very little or no fiber. · Eat low-fat dairy foods. Drink fat-free (skim) milk or 1% milk. Eat fat-free yogurt and low-fat cottage cheese. Try low-fat cheeses such as mozzarella and other reduced-fat cheeses. · Choose meat and other protein foods that are low in fat. Choose beans or other legumes such as split peas or lentils. Choose fish, skinless poultry (chicken or turkey), or lean cuts of red meat (beef or pork). Before you cook meat or poultry, cut off any visible fat. · Use less fat and oil. Try baking foods instead of frying them. Add less fat, such as margarine, sour cream, regular salad dressing and mayonnaise to foods. Eat fewer high-fat foods. Some examples of high-fat foods include french fries, doughnuts, ice cream, and cakes. · Eat fewer sweets. Limit foods and drinks that are high in sugar. This includes candy, cookies, regular soda, and sweetened drinks. Exercise:  Exercise at least 30 minutes per day on most days of the week.  Some examples of exercise include walking, biking, dancing, and swimming. You can also fit in more physical activity by taking the stairs instead of the elevator or parking farther away from stores. Ask your healthcare provider about the best exercise plan for you. © Copyright [employer] 2018 Information is for End User's use only and may not be sold, redistributed or otherwise used for commercial purposes. All illustrations and images included in CareNotes® are the copyrighted property of [employer] or [address] [place] [address]  Type 2 Diabetes Management for Adults   AMBULATORY CARE:   Type 2 diabetes  is a disease that affects how your body uses glucose (sugar). Either your body cannot make enough insulin, or it cannot use the insulin correctly. It is important to keep diabetes controlled to prevent damage to your heart, blood vessels, and other organs. Management will help you feel well and enjoy your daily activities. Your diabetes care team providers can help you make a plan to fit diabetes care into your schedule. Your plan can change over time to fit your needs and your family's needs. Have someone call your local emergency number (911 in the [address]) if:   • You cannot be woken. • You have signs of diabetic ketoacidosis:     ? confusion, fatigue    ? vomiting    ? rapid heartbeat    ? fruity smelling breath    ? extreme thirst    ? dry mouth and skin    • You have any of the following signs of a heart attack:      ? Squeezing, pressure, or pain in your chest    ? You may  also have any of the following:     - Discomfort or pain in your back, neck, jaw, stomach, or arm    - Shortness of breath    - Nausea or vomiting    - Lightheadedness or a sudden cold sweat    • You have any of the following signs of a stroke:      ? Numbness or drooping on one side of your face     ? Weakness in an arm or leg    ? Confusion or difficulty speaking    ?  Dizziness, a severe headache, or vision loss    Call your doctor or diabetes care team provider if:   • You have a sore or wound that will not heal.    • You have a change in the amount you urinate. • Your blood sugar levels are higher than your target goals. • You often have lower blood sugar levels than your target goals. • Your skin is red, dry, warm, or swollen. • You have trouble coping with diabetes, or you feel anxious or depressed. • You have questions or concerns about your condition or care. What you need to know about high blood sugar levels:  High blood sugar levels may not cause any symptoms. You may feel more thirsty or urinate more often than usual. Over time, high blood sugar levels can damage your nerves, blood vessels, tissues, and organs. The following can increase your blood sugar levels:  • Large meals or large amounts of carbohydrates at one time    • Less physical activity    • Stress    • Illness    • A lower dose of diabetes medicine or insulin, or a late dose    What you need to know about low blood sugar levels:  Symptoms include feeling shaky, dizzy, irritable, or confused. You can prevent symptoms by keeping your blood sugar levels from going too low. • Treat a low blood sugar level right away:      ? Drink 4 ounces of juice or have 1 tube of glucose gel. ? Check your blood sugar level again 10 to 15 minutes later. ? When the level goes back to normal, eat a meal or snack to prevent another decrease. • Keep glucose gel, raisins, or hard candy with you at all times to treat a low blood sugar level. • Your blood sugar level can get too low if you take diabetes medicine or insulin and do not eat enough food. • If you use insulin, check your blood sugar level before you exercise. ? If your blood sugar level is below 100 mg/dL, eat 4 crackers or 2 ounces of raisins, or drink 4 ounces of juice. ? Check your level every 30 minutes if you exercise longer than 1 hour. ? You may need a snack during or after exercise.     What you can do to manage your blood sugar levels:   • Check your blood sugar levels as directed and as needed. Several items are available to use to check your levels. You may need to check by testing a drop of blood in a glucose monitor. You may instead be given a continuous glucose monitoring (CGM) device. The device is worn at all times. The CGM checks your blood sugar level every 5 minutes. It sends results to an electronic device such as a smart phone. A CGM can be used with or without an insulin pump. You and your diabetes care team providers will decide on the best method for you. The goal for blood sugar levels before meals  is between 80 and 130 mg/dL and 2 hours after eating  is lower than 180 mg/dL. • Make healthy food choices. Work with a dietitian to develop a meal plan that works for you and your schedule. A dietitian can help you learn how to eat the right amount of carbohydrates during your meals and snacks. Carbohydrates can raise your blood sugar level if you eat too many at one time. Examples of foods that contain carbohydrates are breads, cereals, rice, pasta, and sweets. • Eat high-fiber foods as directed. Fiber helps improve blood sugar levels. Fiber also lowers your risk for heart disease and other problems diabetes can cause. Examples of high-fiber foods include vegetables, whole-grain bread, and beans such as orozco beans. Your dietitian can tell you how much fiber to have each day. • Get regular physical activity. Physical activity can help you get to your target blood sugar level goal and manage your weight. Get at least 150 minutes of moderate to vigorous aerobic physical activity each week. Do not miss more than 2 days in a row. Do not sit longer than 30 minutes at a time. Your healthcare provider can help you create an activity plan. The plan can include the best activities for you and can help you build your strength and endurance. • Maintain a healthy weight.   Ask your team what a healthy weight is for you. A healthy weight can help you control diabetes and prevent heart disease. Ask your team to help you create a weight loss plan, if needed. Weight loss can help make a difference in managing diabetes. Your team will help you set a weight-loss goal, such as 10 to 15 pounds, or 5% of your extra weight. Together you and your team can set manageable weight loss goals. • Take your diabetes medicine or insulin as directed. You may need diabetes medicine, insulin, or both to help control your blood sugar levels. Your healthcare provider will teach you how and when to take your diabetes medicine or insulin. You will also be taught about side effects oral diabetes medicine can cause. Insulin may be injected or given through a pump or pen. You and your providers will decide on the best method for you:    ? An insulin pump  is an implanted device that gives your insulin 24 hours a day. An insulin pump prevents the need for multiple insulin injections in a day. ? An insulin pen  is a device prefilled with the right amount of insulin. ? You and your family members will be taught how to draw up and give insulin  if this is the best method for you. Your providers will also teach you how to dispose of needles and syringes. ? You will learn how much insulin you need  and when to give it. You will be taught when not to give insulin. You will also be taught what to do if your blood sugar level drops too low. This may happen if you take insulin and do not eat the right amount of carbohydrates. More ways to manage type 2 diabetes:   • Wear medical alert identification. Wear medical alert jewelry or carry a card that says you have diabetes. Ask your provider where to get these items. • Do not smoke. Nicotine and other chemicals in cigarettes and cigars can cause lung and blood vessel damage. It also makes it more difficult to manage your diabetes.  Ask your provider for information if you currently smoke and need help to quit. Do not use e-cigarettes or smokeless tobacco in place of cigarettes or to help you quit. They still contain nicotine. • Check your feet each day for cuts, scratches, calluses, or other wounds. Look for redness and swelling, and feel for warmth. Wear shoes that fit well. Check your shoes for rocks or other objects that can hurt your feet. Do not walk barefoot or wear shoes without socks. Wear cotton socks to help keep your feet dry. • Ask about vaccines you may need. You have a higher risk for serious illness if you get the flu, pneumonia, COVID-19, or hepatitis. Ask your provider if you should get vaccines to prevent these or other diseases, and when to get the vaccines. • Talk to your provider if you become stressed about diabetes care. Sometimes being able to fit diabetes care into your life can cause increased stress. The stress can cause you not to take care of yourself properly. Your care team providers can help by offering tips about self-care. Your providers may suggest you talk to a mental health provider who can listen and offer help with self-care issues. • Have your A1c checked as directed. Your provider may check your A1c every 3 months, or 2 times each year if your diabetes is controlled. An A1c test shows the average amount of sugar in your blood over the past 2 to 3 months. Your provider will tell you what your A1c level should be. • Have screening tests as directed. Your provider may recommend screening for complications of diabetes and other conditions that may develop. Some screenings may begin right away and some may happen within the first 5 years of diagnosis:    ? Examples of diabetes complications  include kidney problems, high cholesterol, high blood pressure, blood vessel problems, eye problems, and sleep apnea. ?  You may be screened for a low vitamin B level  if you take oral diabetes medicine for a long time.    ? Women of childbearing years may be screened  for polycystic ovarian syndrome (PCOS). Follow up with your doctor or diabetes care team providers as directed: You may need to have blood tests done before your follow-up visit. The test results will show if changes need to be made in your treatment or self-care. Talk to your provider if you cannot afford your medicine. Write down your questions so you remember to ask them during your visits. © Copyright [employer] 2022 Information is for End User's use only and may not be sold, redistributed or otherwise used for commercial purposes. The above information is an  only. It is not intended as medical advice for individual conditions or treatments. Talk to your doctor, nurse or pharmacist before following any medical regimen to see if it is safe and effective for you. Type 2 Diabetes Management for Adults   AMBULATORY CARE:   Type 2 diabetes  is a disease that affects how your body uses glucose (sugar). Either your body cannot make enough insulin, or it cannot use the insulin correctly. It is important to keep diabetes controlled to prevent damage to your heart, blood vessels, and other organs. Management will help you feel well and enjoy your daily activities. Your diabetes care team providers can help you make a plan to fit diabetes care into your schedule. Your plan can change over time to fit your needs and your family's needs. Have someone call your local emergency number (911 in the [address]) if:   • You cannot be woken. • You have signs of diabetic ketoacidosis:     ? confusion, fatigue    ? vomiting    ? rapid heartbeat    ? fruity smelling breath    ? extreme thirst    ? dry mouth and skin    • You have any of the following signs of a heart attack:      ?  Squeezing, pressure, or pain in your chest    ? You may  also have any of the following:     - Discomfort or pain in your back, neck, jaw, stomach, or arm    - Shortness of breath    - Nausea or vomiting    - Lightheadedness or a sudden cold sweat    • You have any of the following signs of a stroke:      ? Numbness or drooping on one side of your face     ? Weakness in an arm or leg    ? Confusion or difficulty speaking    ? Dizziness, a severe headache, or vision loss    Call your doctor or diabetes care team provider if:   • You have a sore or wound that will not heal.    • You have a change in the amount you urinate. • Your blood sugar levels are higher than your target goals. • You often have lower blood sugar levels than your target goals. • Your skin is red, dry, warm, or swollen. • You have trouble coping with diabetes, or you feel anxious or depressed. • You have questions or concerns about your condition or care. What you need to know about high blood sugar levels:  High blood sugar levels may not cause any symptoms. You may feel more thirsty or urinate more often than usual. Over time, high blood sugar levels can damage your nerves, blood vessels, tissues, and organs. The following can increase your blood sugar levels:  • Large meals or large amounts of carbohydrates at one time    • Less physical activity    • Stress    • Illness    • A lower dose of diabetes medicine or insulin, or a late dose    What you need to know about low blood sugar levels:  Symptoms include feeling shaky, dizzy, irritable, or confused. You can prevent symptoms by keeping your blood sugar levels from going too low. • Treat a low blood sugar level right away:      ? Drink 4 ounces of juice or have 1 tube of glucose gel. ? Check your blood sugar level again 10 to 15 minutes later. ? When the level goes back to normal, eat a meal or snack to prevent another decrease. • Keep glucose gel, raisins, or hard candy with you at all times to treat a low blood sugar level.      • Your blood sugar level can get too low if you take diabetes medicine or insulin and do not eat enough food.     • If you use insulin, check your blood sugar level before you exercise. ? If your blood sugar level is below 100 mg/dL, eat 4 crackers or 2 ounces of raisins, or drink 4 ounces of juice. ? Check your level every 30 minutes if you exercise longer than 1 hour. ? You may need a snack during or after exercise. What you can do to manage your blood sugar levels:   • Check your blood sugar levels as directed and as needed. Several items are available to use to check your levels. You may need to check by testing a drop of blood in a glucose monitor. You may instead be given a continuous glucose monitoring (CGM) device. The device is worn at all times. The CGM checks your blood sugar level every 5 minutes. It sends results to an electronic device such as a smart phone. A CGM can be used with or without an insulin pump. You and your diabetes care team providers will decide on the best method for you. The goal for blood sugar levels before meals  is between 80 and 130 mg/dL and 2 hours after eating  is lower than 180 mg/dL. • Make healthy food choices. Work with a dietitian to develop a meal plan that works for you and your schedule. A dietitian can help you learn how to eat the right amount of carbohydrates during your meals and snacks. Carbohydrates can raise your blood sugar level if you eat too many at one time. Examples of foods that contain carbohydrates are breads, cereals, rice, pasta, and sweets. • Eat high-fiber foods as directed. Fiber helps improve blood sugar levels. Fiber also lowers your risk for heart disease and other problems diabetes can cause. Examples of high-fiber foods include vegetables, whole-grain bread, and beans such as orozco beans. Your dietitian can tell you how much fiber to have each day. • Get regular physical activity. Physical activity can help you get to your target blood sugar level goal and manage your weight.  Get at least 150 minutes of moderate to vigorous aerobic physical activity each week. Do not miss more than 2 days in a row. Do not sit longer than 30 minutes at a time. Your healthcare provider can help you create an activity plan. The plan can include the best activities for you and can help you build your strength and endurance. • Maintain a healthy weight. Ask your team what a healthy weight is for you. A healthy weight can help you control diabetes and prevent heart disease. Ask your team to help you create a weight loss plan, if needed. Weight loss can help make a difference in managing diabetes. Your team will help you set a weight-loss goal, such as 10 to 15 pounds, or 5% of your extra weight. Together you and your team can set manageable weight loss goals. • Take your diabetes medicine or insulin as directed. You may need diabetes medicine, insulin, or both to help control your blood sugar levels. Your healthcare provider will teach you how and when to take your diabetes medicine or insulin. You will also be taught about side effects oral diabetes medicine can cause. Insulin may be injected or given through a pump or pen. You and your providers will decide on the best method for you:    ? An insulin pump  is an implanted device that gives your insulin 24 hours a day. An insulin pump prevents the need for multiple insulin injections in a day. ? An insulin pen  is a device prefilled with the right amount of insulin. ? You and your family members will be taught how to draw up and give insulin  if this is the best method for you. Your providers will also teach you how to dispose of needles and syringes. ? You will learn how much insulin you need  and when to give it. You will be taught when not to give insulin. You will also be taught what to do if your blood sugar level drops too low. This may happen if you take insulin and do not eat the right amount of carbohydrates.     More ways to manage type 2 diabetes:   • Wear medical alert identification. Wear medical alert jewelry or carry a card that says you have diabetes. Ask your provider where to get these items. • Do not smoke. Nicotine and other chemicals in cigarettes and cigars can cause lung and blood vessel damage. It also makes it more difficult to manage your diabetes. Ask your provider for information if you currently smoke and need help to quit. Do not use e-cigarettes or smokeless tobacco in place of cigarettes or to help you quit. They still contain nicotine. • Check your feet each day for cuts, scratches, calluses, or other wounds. Look for redness and swelling, and feel for warmth. Wear shoes that fit well. Check your shoes for rocks or other objects that can hurt your feet. Do not walk barefoot or wear shoes without socks. Wear cotton socks to help keep your feet dry. • Ask about vaccines you may need. You have a higher risk for serious illness if you get the flu, pneumonia, COVID-19, or hepatitis. Ask your provider if you should get vaccines to prevent these or other diseases, and when to get the vaccines. • Talk to your provider if you become stressed about diabetes care. Sometimes being able to fit diabetes care into your life can cause increased stress. The stress can cause you not to take care of yourself properly. Your care team providers can help by offering tips about self-care. Your providers may suggest you talk to a mental health provider who can listen and offer help with self-care issues. • Have your A1c checked as directed. Your provider may check your A1c every 3 months, or 2 times each year if your diabetes is controlled. An A1c test shows the average amount of sugar in your blood over the past 2 to 3 months. Your provider will tell you what your A1c level should be. • Have screening tests as directed.   Your provider may recommend screening for complications of diabetes and other conditions that may develop. Some screenings may begin right away and some may happen within the first 5 years of diagnosis:    ? Examples of diabetes complications  include kidney problems, high cholesterol, high blood pressure, blood vessel problems, eye problems, and sleep apnea. ? You may be screened for a low vitamin B level  if you take oral diabetes medicine for a long time. ? Women of childbearing years may be screened  for polycystic ovarian syndrome (PCOS). Follow up with your doctor or diabetes care team providers as directed: You may need to have blood tests done before your follow-up visit. The test results will show if changes need to be made in your treatment or self-care. Talk to your provider if you cannot afford your medicine. Write down your questions so you remember to ask them during your visits. © Copyright [employer] 2022 Information is for End User's use only and may not be sold, redistributed or otherwise used for commercial purposes. The above information is an  only. It is not intended as medical advice for individual conditions or treatments. Talk to your doctor, nurse or pharmacist before following any medical regimen to see if it is safe and effective for you.

## 2023-09-08 NOTE — PROGRESS NOTES
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[address])    Type 2 diabetes mellitus without complication, without long-term current use of insulin (Formerly [facility])       Lab Results   Component Value Date    HGBA1C 6.9 (H) 09/06/2023   New onset type 2 diabetes stated counseled the patient and educated the patient type 2 diabetes with complications of type 2 diabetes the current screening guidelines and target parameters with A1c for now he will try to work on his diet I have signed him up for the ambulatory referral for diabetes education he will work on modification of his diet illumination of beer from his diet reduction of carbohydrates and sweets I will be ordering diabetic laboratories including comprehensive metabolic panel, hemoglobin A1c, urine microalbumin, lipid panel. We will consider possibly adding metformin at the next visit         Relevant Orders    Ambulatory referral to Diabetic Education - use to refer for diabetes group classes, individual diabetes education, medical nutrition therapy, device training    Hemoglobin A1C    Comprehensive metabolic panel    Lipid Panel with Direct LDL reflex    Microalbumin, Random Urine (W/Creatinine) ([facility] ONLY)       Cardiovascular and Mediastinum    Symptomatic varicose veins of left lower extremity     We will have patient see vascular Doppler in the past negative         Relevant Orders    Ambulatory Referral to Vascular Surgery       Other    Hyperlipidemia     Hyperlipidemia controlled continue with current medical regiment recommend a low-cholesterol diet and recommend routine exercise we will continue to monitor the progress.   He has not been taking the Crestor regularly I have counseled patient compliance and he has agreed to start taking it daily         Relevant Medications    rosuvastatin (CRESTOR) 5 mg tablet    Overweight with body mass index (BMI) of 27 to 27.9 in adult     I have counselled the pt to follow a healthy and balanced diet ,and recommend routine exercise. Pituitary mass ([address])     Clinically the patient is stable to be monitored for endocrinologist Dr. Mita Leblanc         Medicare annual wellness visit, subsequent     Assessment and plan 1. Medicare subsequent annual wellness examination overall the patient is clinically stable and doing well, we encouraged the patient to follow a healthy and balanced diet. We recommend that the patient exercise routinely approximately 30 minutes 5 times per week . We have reviewed the patient's vaccines and have made recommendations for updates if necessary   annual flu shot   . We will be ordering screening laboratories which are age appropriate. Return to the office in 6 months    call if any problems. Other Visit Diagnoses     Encounter for immunization    -  Primary    Relevant Orders    Pneumococcal Conjugate Vaccine 20-valent (PCV20) (Completed)        BMI Counseling: Body mass index is 27.79 kg/m². The BMI is above normal. Nutrition recommendations include decreasing portion sizes and moderation in carbohydrate intake. Exercise recommendations include exercising 3-5 times per week. Rationale for BMI follow-up plan is due to patient being overweight or obese. Depression Screening and Follow-up Plan: Patient was screened for depression during today's encounter. They screened negative with a PHQ-2 score of 0. Preventive health issues were discussed with patient, and age appropriate screening tests were ordered as noted in patient's After Visit Summary. Personalized health advice and appropriate referrals for health education or preventive services given if needed, as noted in patient's After Visit Summary.      History of Present Illness:     Patient presents for a Medicare Wellness Visit    HPI 71-year old male coming in for a follow up visit regarding hyperlipidemia, new onset type 2 diabetes, symptomatic varicose veins left lower extremity, pituitary macroadenoma/mass and overweight; the patient reports me compliant taking medications without untoward side effects the. The patient is here to review his medical condition, update me on the medical condition and the patient reports me no hospitalizations and no ER visits. No injuries no illnesses here to review laboratories in detail reports me his diet has not been as good as usual he also reports me he has been drinking socially beer on a regular basis but is willing to quit at this point in time. He is also noticed some aching of his left lower extremity varicose veins he has had a Doppler in the past that was negative for DVT he is interested and treatment options for the varicose veins including surgical interventions. Patient Care Team:  Lui sAngel Saxena DO as PCP - Deandre Romo MD as Ana Vergara MD (Endocrinology)     Review of Systems:     Review of Systems   Constitutional: Negative for activity change, appetite change and unexpected weight change. HENT: Negative for congestion and postnasal drip. Eyes: Negative for visual disturbance. Respiratory: Negative for cough and shortness of breath. Cardiovascular: Negative for chest pain. Gastrointestinal: Negative for abdominal pain, diarrhea, nausea and vomiting. Neurological: Negative for dizziness, light-headedness and headaches. Hematological: Negative for adenopathy.         Problem List:     Patient Active Problem List   Diagnosis   • Hyperlipidemia   • Prediabetes   • Overweight with body mass index (BMI) of 27 to 27.9 in adult   • GERD (gastroesophageal reflux disease)   • History of colon polyps   • Screening for malignant neoplasm of colon   • Asymptomatic varicose veins of both lower extremities   • Chronic left shoulder pain   • Plantar fasciitis of right foot   • Increased prostate specific antigen (PSA) velocity   • Need for influenza vaccination   • Gastroesophageal reflux disease without esophagitis   • Healthcare maintenance   • Congestion of both ears   • Pituitary mass (HCC)   • Wellness examination   • Venous insufficiency   • Medicare annual wellness visit, subsequent   • 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[address])   • Type 2 diabetes mellitus without complication, without long-term current use of insulin (HCC)   • Symptomatic varicose veins of left lower extremity      Past Medical and Surgical History:     Past Medical History:   Diagnosis Date   • Benign neoplasm of colon    • Colon polyp    • GERD (gastroesophageal reflux disease)     mild diet controlled   • Hyperlipidemia      Past Surgical History:   Procedure Laterality Date   • INGUINAL HERNIA REPAIR Bilateral    • MOHS SURGERY  2000    nose   • UT COLONOSCOPY FLX DX W/COLLJ SPEC WHEN PFRMD N/A 12/18/2018    Procedure: COLONOSCOPY;  Surgeon: Mari Culp MD;  Location[facility]; Service: Gastroenterology      Family History:     Family History   Problem Relation Age of Onset   • Varicose Veins Mother    • Hypertension Mother    • Parkinsonism Father    • Hypertension Father    • No Known Problems Sister    • No Known Problems Brother    • No Known Problems Sister    • No Known Problems Sister    • No Known Problems Sister    • No Known Problems Sister    • No Known Problems Brother       Social History:     Social History     Socioeconomic History   • Marital status: /Civil Union     Spouse name: None   • Number of children: None   • Years of education: None   • Highest education level: None   Occupational History   • None   Tobacco Use   • Smoking status: Never   • Smokeless tobacco: Never   Vaping Use   • Vaping Use: Never used   Substance and Sexual Activity   • Alcohol use:  Yes     Alcohol/week: 14.0 standard drinks of alcohol     Types: 14 Cans of beer per week     Comment: weekends, sometimes   • Drug use: No   • Sexual activity: Yes   Other Topics Concern   • None   Social History Narrative    Dog      Social Determinants of Health     Financial Resource Strain: Low Risk  (9/8/2023)    Overall Financial Resource Strain (CARDIA)    • Difficulty of Paying Living Expenses: Not hard at all   Food Insecurity: Not on file   Transportation Needs: No Transportation Needs (9/8/2023)    PRAPARE - Transportation    • Lack of Transportation (Medical): No    • Lack of Transportation (Non-Medical): No   Physical Activity: Not on file   Stress: Not on file   Social Connections: Not on file   Intimate Partner Violence: Not on file   Housing Stability: Not on file      Medications and Allergies:     Current Outpatient Medications   Medication Sig Dispense Refill   • rosuvastatin (CRESTOR) 5 mg tablet Take 1 tablet (5 mg total) by mouth daily 90 tablet 1     No current facility-administered medications for this visit. No Known Allergies   Immunizations:     Immunization History   Administered Date(s) Administered   • COVID-19 MODERNA VACC 0.25 ML IM BOOSTER 10/26/2021   • COVID-19 MODERNA VACC 0.5 ML IM 12/29/2020, 01/25/2021   • Influenza Quadrivalent 3 years and older 11/29/2018   • Influenza, recombinant, quadrivalent,injectable, preservative free 09/26/2019   • Pneumococcal Conjugate Vaccine 20-valent (Pcv20), Polysace 09/08/2023   • Pneumococcal Polysaccharide PPV23 08/31/2021   • Tdap 09/11/2018      Health Maintenance:         Topic Date Due   • Colorectal Cancer Screening  12/18/2027   • Hepatitis C Screening  Completed         Topic Date Due   • COVID-19 Vaccine (4 - Moderna series) 12/21/2021   • Influenza Vaccine (1) 09/01/2023      Medicare Screening Tests and Risk Assessments:     Lupis Doyle is here for his Subsequent Wellness visit. Health Risk Assessment:   Patient rates overall health as good. Patient feels that their physical health rating is same. Patient is satisfied with their life. Eyesight was rated as same. Hearing was rated as same. Patient feels that their emotional and mental health rating is same.  Patients states they are never, rarely angry. Patient states they are never, rarely unusually tired/fatigued. Pain experienced in the last 7 days has been none. Patient states that he has experienced no weight loss or gain in last 6 months. Depression Screening:   PHQ-2 Score: 0      Fall Risk Screening: In the past year, patient has experienced: no history of falling in past year      Home Safety:  Patient does not have trouble with stairs inside or outside of their home. Patient has working smoke alarms and has working carbon monoxide detector. Home safety hazards include: none. Nutrition:   Current diet is Regular. Medications:   Patient is currently taking over-the-counter supplements. OTC medications include: see medication list. Patient is able to manage medications. Activities of Daily Living (ADLs)/Instrumental Activities of Daily Living (IADLs):   Walk and transfer into and out of bed and chair?: Yes  Dress and groom yourself?: Yes    Bathe or shower yourself?: Yes    Feed yourself?  Yes  Do your laundry/housekeeping?: Yes  Manage your money, pay your bills and track your expenses?: Yes  Make your own meals?: Yes    Do your own shopping?: Yes    Previous Hospitalizations:   Any hospitalizations or ED visits within the last 12 months?: No      Advance Care Planning:   Living will: No    Durable POA for healthcare: No    Advanced directive: No    Advanced directive counseling given: Yes    Five wishes given: Yes      Cognitive Screening:   Provider or family/friend/caregiver concerned regarding cognition?: No    PREVENTIVE SCREENINGS      Cardiovascular Screening:    General: Screening Not Indicated and History Lipid Disorder      Diabetes Screening:     General: Screening Current      Colorectal Cancer Screening:     General: Screening Current      Prostate Cancer Screening:    General: Screening Current      Abdominal Aortic Aneurysm (AAA) Screening:    Risk factors include: age between 70-75 yo Lung Cancer Screening:     General: Screening Not Indicated      Hepatitis C Screening:    General: Screening Current    Screening, Brief Intervention, and Referral to Treatment (SBIRT)    Screening  Typical number of drinks in a day: 2  Typical number of drinks in a week: 14  Interpretation: Low risk drinking behavior. Single Item Drug Screening:  How often have you used an illegal drug (including marijuana) or a prescription medication for non-medical reasons in the past year? never    Single Item Drug Screen Score: 0  Interpretation: Negative screen for possible drug use disorder    No results found. Physical Exam:     /80   Pulse 64   Resp 16   Ht 5' 8" (1.727 m)   Wt 82.9 kg (182 lb 12.8 oz)   SpO2 97%   BMI 27.79 kg/m²     Physical Exam  Vitals and nursing note reviewed. Constitutional:       General: He is not in acute distress. Appearance: Normal appearance. He is well-developed. He is not ill-appearing, toxic-appearing or diaphoretic. HENT:      Head: Normocephalic and atraumatic. Right Ear: External ear normal.      Left Ear: External ear normal.      Nose: Nose normal.   Eyes:      Pupils: Pupils are equal, round, and reactive to light. Cardiovascular:      Rate and Rhythm: Normal rate and regular rhythm. Pulses: no weak pulses          Dorsalis pedis pulses are 2+ on the right side and 2+ on the left side. Posterior tibial pulses are 2+ on the right side and 2+ on the left side. Heart sounds: Normal heart sounds. No murmur heard. Pulmonary:      Effort: Pulmonary effort is normal.      Breath sounds: Normal breath sounds. Abdominal:      General: There is no distension. Palpations: Abdomen is soft. Tenderness: There is no abdominal tenderness. There is no guarding. Feet:      Right foot:      Skin integrity: No ulcer, skin breakdown, erythema, warmth, callus or dry skin.       Left foot:      Skin integrity: No ulcer, skin breakdown, erythema, warmth, callus or dry skin. Neurological:      Mental Status: He is alert. Diabetic Foot Exam    Patient's shoes and socks removed. Right Foot/Ankle   Right Foot Inspection  Skin Exam: skin normal and skin intact. No dry skin, no warmth, no callus, no erythema, no maceration, no abnormal color, no pre-ulcer, no ulcer and no callus. Toe Exam: ROM and strength within normal limits. Sensory   Monofilament testing: intact    Vascular  The right DP pulse is 2+. The right PT pulse is 2+. Left Foot/Ankle  Left Foot Inspection  Skin Exam: skin normal and skin intact. No dry skin, no warmth, no erythema, no maceration, normal color, no pre-ulcer, no ulcer and no callus. Toe Exam: ROM and strength within normal limits. Sensory   Monofilament testing: intact    Vascular  The left DP pulse is 2+. The left PT pulse is 2+.      Assign Risk Category  No deformity present  No loss of protective sensation  No weak pulses  Risk: 0  Left lower extremity varicose veins noted  Vasile Elks, DO

## 2023-09-09 PROBLEM — I83.892 SYMPTOMATIC VARICOSE VEINS OF LEFT LOWER EXTREMITY: Status: ACTIVE | Noted: 2023-09-09

## 2023-09-09 PROBLEM — E11.9 TYPE 2 DIABETES MELLITUS WITHOUT COMPLICATION, WITHOUT LONG-TERM CURRENT USE OF INSULIN (HCC): Status: ACTIVE | Noted: 2023-09-09

## 2023-09-09 NOTE — ASSESSMENT & PLAN NOTE
Lab Results   Component Value Date    HGBA1C 6.9 (H) 09/06/2023   New onset type 2 diabetes stated counseled the patient and educated the patient type 2 diabetes with complications of type 2 diabetes the current screening guidelines and target parameters with A1c for now he will try to work on his diet I have signed him up for the ambulatory referral for diabetes education he will work on modification of his diet illumination of beer from his diet reduction of carbohydrates and sweets I will be ordering diabetic laboratories including comprehensive metabolic panel, hemoglobin A1c, urine microalbumin, lipid panel.   We will consider possibly adding metformin at the next visit

## 2023-09-09 NOTE — ASSESSMENT & PLAN NOTE
Assessment and plan 1. Medicare subsequent annual wellness examination overall the patient is clinically stable and doing well, we encouraged the patient to follow a healthy and balanced diet. We recommend that the patient exercise routinely approximately 30 minutes 5 times per week . We have reviewed the patient's vaccines and have made recommendations for updates if necessary   annual flu shot   . We will be ordering screening laboratories which are age appropriate. Return to the office in 6 months    call if any problems.

## 2023-09-09 NOTE — ASSESSMENT & PLAN NOTE
Hyperlipidemia controlled continue with current medical regiment recommend a low-cholesterol diet and recommend routine exercise we will continue to monitor the progress.   He has not been taking the Crestor regularly I have counseled patient compliance and he has agreed to start taking it daily

## 2023-09-09 NOTE — ASSESSMENT & PLAN NOTE
Assessment and plan 1. Health maintenance annual wellness examination overall the patient is clinically stable and doing well, we encouraged the patient to follow a healthy and balanced diet. We recommend that the patient exercise routinely approximately 30 minutes 5 times per week . We have reviewed the patient's vaccines and have made recommendations for updates if necessary      . We will be ordering screening laboratories which are age appropriate. Return to the office in      call if any problems.

## 2023-09-13 ENCOUNTER — TELEPHONE (OUTPATIENT)
Dept: NEUROSURGERY | Facility: CLINIC | Age: 68
End: 2023-09-13

## 2023-09-14 ENCOUNTER — OFFICE VISIT (OUTPATIENT)
Dept: DIABETES SERVICES | Facility: CLINIC | Age: 68
End: 2023-09-14
Payer: COMMERCIAL

## 2023-09-14 VITALS — BODY MASS INDEX: 27.67 KG/M2 | WEIGHT: 182 LBS

## 2023-09-14 DIAGNOSIS — E11.9 TYPE 2 DIABETES MELLITUS WITHOUT COMPLICATION, WITHOUT LONG-TERM CURRENT USE OF INSULIN (HCC): Primary | ICD-10-CM

## 2023-09-14 PROCEDURE — 97802 MEDICAL NUTRITION INDIV IN: CPT

## 2023-09-14 NOTE — PROGRESS NOTES
Medical Nutrition Therapy    Assessment    Visit Type: Initial visit  Chief complaint/Medical Diagnosis/reason for visit: E11.9 (ICD-10-CM) - Type 2 diabetes mellitus without complication, without long-term current use of insulin ([address])    YAYA Izquierdo was seen today unaccompanied regarding recent diagnosis type 2 diabetes. States he was diagnosed ~1 week ago. Patient is not currently taking any diabetes medications. Last HbA1c was 6.9% in September 2023. He is not currently checking his blood sugars at home. Elias Lewis retired in April of this year. States he has been less active since he retired. Conducted dietary recall. See below for details. He has been cutting out most carbohydrate sources over the past week and has started walking outside. Problems identified in food recall include inadequate carbohydrate intake. Explained basic pathophysiology of diabetes and impact of diet on blood glucose levels. Together we discussed what foods contain carbohydrates, reading a food label, timing of meals and snacks, serving sizes, the role of fiber, the importance of consistent carbohydrate intake in the appropriate amounts. Used the portion booklet to teach Elias Lewis more about food groups and basic carbohydrate counting. Encouraged 3 regular meals ~4-5 hours apart with 1-2 snacks per day as needed. Discussed keeping carbohydrate intake consistent. Goal is 45-60 grams of carbohydrate per meal and 0-15 grams of carbohydrate per snack. Elias Lewis demonstrated good understanding and will call with any questions or if he would like to schedule a follow-up visit. Ht Readings from Last 1 Encounters:   09/08/23 5' 8" (1.727 m)     Wt Readings from Last 3 Encounters:   09/14/23 82.6 kg (182 lb)   09/08/23 82.9 kg (182 lb 12.8 oz)   12/19/22 82.1 kg (181 lb)     Weight Change: No - weight relatively stable over the past year per EMR review.     Barriers to Learning: no barriers    Do you follow any special diet presently?: Yes, has been cutting out almost all carbohydrate sources  Who shops: spouse  Who cooks: spouse    Food Log: Completed via the method of usual daily food recall    Breakfast: cut out bread, hard boiled eggs, 2 cups of black coffee  Morning Snack: none  Lunch: rice and beans, tomato and a hard boiled egg  Afternoon Snack: none  Dinner: meat - variety of meats, salads, potatoes, rice and beans, flour tortillas made from scratch  Evening Snack: has cut out all snacks  Beverages: water, black coffee (stopped putting sugar in his coffee this last week), 1-4 beers daily  Exercise: Has started walking outside for about 50 minutes 4 days a week. Estimated calorie needs: 1,650 kcals/day   Carbohydrate: 45-60 g/meal, 0-15 g/snack       Fat: 5 servings/day      Protein: 7 ounces/day    Nutrition Diagnosis:  Inadequate carbohydrate intake related to food and nutrition related knowledge deficit concerning appropriate amount and timing of carbohydrate intake as evidenced by estimated carbohydrate intake that is different from recommended types or ingested on an irregular basis. Intervention: label reading, behavior modification strategies, carbohydrate counting, meal timing, monitoring portion control and exercise guidelines     Treatment Goals: Patient understands education and recommendations, Patient will consume 3 meals a day, Patient will monitor portion control, Patient will consume snacks, Patient will count carbohydrates and Patient will exercise    Monitoring and evaluation:    Term code indicator  FH 4.4 Mealtime Behavior Criteria: Eat 3 regular meals ~4-5 hours apart with 1-2 snacks per day as needed. Term code indicator  FH 1.6.3 Carbohydrate Intake Criteria: Keep carbohydrate intake consistent. Aim for 45-60 grams of carbohydrate per meal and 0-15 grams of carbohydrate per snack.     Materials Provided: Portion booklet    Patient’s Response to Instruction:  Comprehension: good  Motivation: good  Expected Compliance: good    Start- Stop: 3:22-4:07  Total Minutes: 45 Minutes  Group or Individual Instruction: MNT-I  Other: Luis Angel Saxena, DO    Thank you for coming to the [address] [place] [address] for education today. Please feel free to call with any questions or concerns.     Adán Leung, MS, RD, LDN  [address] [facility] [id number]

## 2023-09-14 NOTE — PATIENT INSTRUCTIONS
Eat 3 regular meals ~4-5 hours apart with 1-2 snacks per day as needed. Keep carbohydrate intake consistent. Aim for 45-60 grams of carbohydrate per meal and 0-15 grams of carbohydrate per snack.

## 2023-10-07 ENCOUNTER — IMMUNIZATIONS (OUTPATIENT)
Dept: INTERNAL MEDICINE CLINIC | Facility: CLINIC | Age: 68
End: 2023-10-07
Payer: COMMERCIAL

## 2023-10-07 DIAGNOSIS — Z23 ENCOUNTER FOR IMMUNIZATION: Primary | ICD-10-CM

## 2023-10-07 PROCEDURE — 90662 IIV NO PRSV INCREASED AG IM: CPT

## 2023-10-07 PROCEDURE — G0008 ADMIN INFLUENZA VIRUS VAC: HCPCS

## 2023-10-17 ENCOUNTER — HOSPITAL ENCOUNTER (OUTPATIENT)
Dept: RADIOLOGY | Facility: HOSPITAL | Age: 68
Discharge: HOME | End: 2023-10-17
Payer: COMMERCIAL

## 2023-10-17 DIAGNOSIS — E23.6 PITUITARY MASS (HCC): ICD-10-CM

## 2023-10-17 PROCEDURE — A9585 GADOBUTROL INJECTION: HCPCS | Performed by: PHYSICIAN ASSISTANT

## 2023-10-17 PROCEDURE — G1004 CDSM NDSC: HCPCS

## 2023-10-17 PROCEDURE — 70553 MRI BRAIN STEM W/O & W/DYE: CPT

## 2023-10-17 RX ORDER — GADOBUTROL 604.72 MG/ML
8 INJECTION INTRAVENOUS
Status: COMPLETED | OUTPATIENT
Start: 2023-10-17 | End: 2023-10-17

## 2023-10-17 RX ADMIN — GADOBUTROL 8 ML: 604.72 INJECTION INTRAVENOUS at 09:58

## 2023-10-23 ENCOUNTER — OFFICE VISIT (OUTPATIENT)
Dept: NEUROSURGERY | Facility: CLINIC | Age: 68
End: 2023-10-23
Payer: COMMERCIAL

## 2023-10-23 VITALS
WEIGHT: 179 LBS | HEART RATE: 60 BPM | OXYGEN SATURATION: 98 % | TEMPERATURE: 97.8 F | BODY MASS INDEX: 27.13 KG/M2 | HEIGHT: 68 IN | DIASTOLIC BLOOD PRESSURE: 78 MMHG | SYSTOLIC BLOOD PRESSURE: 122 MMHG | RESPIRATION RATE: 16 BRPM

## 2023-10-23 DIAGNOSIS — E23.6 PITUITARY MASS (HCC): Primary | ICD-10-CM

## 2023-10-23 PROCEDURE — 99214 OFFICE O/P EST MOD 30 MIN: CPT | Performed by: NURSE PRACTITIONER

## 2023-10-23 NOTE — PROGRESS NOTES
Neurosurgery Office Note  Aron Holly 80 y.o. male MRN: [id number]      Assessment/Plan     Pituitary mass 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 Mild chronic microangiopathy. Plan:  Imaging reviewed with patient. Demonstrates stable pituitary macroadenoma without compression of the optic chiasm. He follows with endocrine, last seen 9/2022; labs completed at that time WNL aside from low testestorone and low T4. Plan for repeat labs per their recommendations. Has f/u in Nov 2023  Has regular eye checkups, but has not completed visual fields recently. Would defer visual field testing to ophthalmologist/optometrist if clinically warranted as there is no optic chiasm compression. No surgery recommended at this time, continue with serial imaging and monitoring  Plan for follow up in about 1 year with repeat MRI brain pituitary w wo contrast with AP or sooner if patient develops worsening symptoms or red flag signs       Diagnoses and all orders for this visit:    Pituitary mass [facility])  -     MRI brain pituitary wo and w contrast; Future          I have spent a total time of 30 minutes on 10/23/23 in caring for this patient including Diagnostic results, Risks and benefits of tx options, Instructions for management, Patient and family education, Importance of tx compliance, Risk factor reductions, Impressions, Counseling / Coordination of care, Documenting in the medical record, Reviewing / ordering tests, medicine, procedures  , and Obtaining or reviewing history  .       CHIEF COMPLAINT    Chief Complaint   Patient presents with    Follow-up     1 yr follow up, with MRI       HISTORY    History of Present Illness     80y.o. year old male with past medical history significant for GERD, type 2 diabetes, plantar fasciitis, hyperlipidemia, and obesity. Patient seen in outpatient office today for approximately 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. This was found incidentally in 2020 during a work-up by ENT for hearing loss. Patient states he is doing well. He denies any visual complaints or hormonal imbalances. His hearing issues have resolved. He continues to follow with endocrinology and has completed labs and has follow-up in November. He has had low testosterone and T4. He does endorse recently being diagnosed with diabetes also following with endocrinology and watching his diet. He does follow with an ophthalmologist for regular eye visits and has one coming up this year but has not completed recent visual field but is also not having any visual issues. He denies any headaches, dizziness, blurry vision, chest pain, shortness of breath, abdominal pain, nausea, vomiting, diarrhea, no problems with bowel or bladder, no new weakness or numbness/tingling. HPI    See Discussion    REVIEW OF SYSTEMS    Review of Systems   Eyes:  Negative for visual disturbance. All other systems reviewed and are negative. ROS reviewed with patient and agree and changes were made as needed    Meds/Allergies     Current Outpatient Medications   Medication Sig Dispense Refill    rosuvastatin (CRESTOR) 5 mg tablet Take 1 tablet (5 mg total) by mouth daily 90 tablet 1     No current facility-administered medications for this visit.        No Known Allergies    PAST HISTORY    Past Medical History:   Diagnosis Date    Benign neoplasm of colon     Colon polyp     GERD (gastroesophageal reflux disease)     mild diet controlled    Hyperlipidemia     Pituitary mass [facility])        Past Surgical History:   Procedure Laterality Date    INGUINAL HERNIA REPAIR Bilateral     MOHS SURGERY  2000    nose    AR COLONOSCOPY FLX DX W/COLLJ SPEC WHEN PFRMD N/A 12/18/2018    Procedure: COLONOSCOPY;  Surgeon: Kyler Etienne MD;  Location: [facility]; Service: Gastroenterology       Social History     Tobacco Use    Smoking status: Never    Smokeless tobacco: Never   Vaping Use    Vaping Use: Never used   Substance Use Topics    Alcohol use: Yes     Alcohol/week: 14.0 standard drinks of alcohol     Types: 14 Cans of beer per week     Comment: weekends, sometimes    Drug use: No       Family History   Problem Relation Age of Onset    Varicose Veins Mother     Hypertension Mother     Parkinsonism Father     Hypertension Father     No Known Problems Sister     No Known Problems Brother     No Known Problems Sister     No Known Problems Sister     No Known Problems Sister     No Known Problems Sister     No Known Problems Brother          Above history personally reviewed. EXAM    Vitals:Blood pressure 122/78, pulse 60, temperature 97.8 °F (36.6 °C), temperature source Temporal, resp. rate 16, height 5' 8" (1.727 m), weight 81.2 kg (179 lb), SpO2 98 %. ,Body mass index is 27.22 kg/m². Physical Exam  Vitals reviewed. Constitutional:       General: He is awake. He is not in acute distress. Appearance: Normal appearance. He is not ill-appearing. HENT:      Head: Normocephalic and atraumatic. Eyes:      Extraocular Movements: Extraocular movements intact and EOM normal.      Conjunctiva/sclera: Conjunctivae normal.      Pupils: Pupils are equal, round, and reactive to light. Cardiovascular:      Rate and Rhythm: Normal rate. Pulmonary:      Effort: Pulmonary effort is normal. No respiratory distress. Chest:      Chest wall: No tenderness. Abdominal:      General: There is no distension. Palpations: Abdomen is soft. Tenderness: There is no abdominal tenderness. Musculoskeletal:         General: Normal range of motion. Cervical back: Normal range of motion and neck supple. Skin:     General: Skin is warm and dry. Neurological:      Mental Status: He is alert and oriented to person, place, and time.       Motor: Motor strength is normal.     Coordination: Finger-Nose-Finger Test normal.      Gait: Gait is intact. Deep Tendon Reflexes:      Reflex Scores:       Bicep reflexes are 2+ on the right side and 2+ on the left side. Patellar reflexes are 2+ on the right side and 2+ on the left side. Psychiatric:         Attention and Perception: Attention and perception normal.         Mood and Affect: Mood and affect normal.         Speech: Speech normal.         Behavior: Behavior normal. Behavior is cooperative. Thought Content: Thought content normal.         Cognition and Memory: Cognition and memory normal.         Judgment: Judgment normal.         Neurologic Exam     Mental Status   Oriented to person, place, and time. Follows 2 step commands. Attention: normal. Concentration: normal.   Speech: speech is normal   Level of consciousness: alert  Knowledge: good. Able to perform simple calculations. Able to name object. Normal comprehension. Cranial Nerves     CN II   Right visual field deficit: none  Left visual field deficit: none     CN III, IV, VI   Pupils are equal, round, and reactive to light. Extraocular motions are normal.   CN III: no CN III palsy  CN VI: no CN VI palsy  Nystagmus: none   Diplopia: none  Conjugate gaze: present    CN V   Facial sensation intact. CN VII   Facial expression full, symmetric. CN VIII   CN VIII normal.   Hearing: intact    CN IX, X   CN IX normal.     CN XI   CN XI normal.     CN XII   CN XII normal.     Motor Exam   Muscle bulk: normal  Overall muscle tone: normal  Right arm pronator drift: absent  Left arm pronator drift: absent    Strength   Strength 5/5 throughout.      Sensory Exam   Light touch normal.   Proprioception normal.   JPS and DST intact     Gait, Coordination, and Reflexes     Gait  Gait: normal    Coordination   Finger to nose coordination: normal    Tremor   Resting tremor: absent  Intention tremor: absent  Action tremor: absent    Reflexes   Right biceps: 2+  Left biceps: 2+  Right patellar: 2+  Left patellar: 2+  Right Buitrago: absent  Left Buitrago: absent  Right ankle clonus: absent  Left ankle clonus: absent        MEDICAL DECISION MAKING    Imaging Studies:     MRI brain pituitary wo and w contrast    Result Date: 10/18/2023  Narrative: MRI BRAIN AND SELLA  WITH AND WITHOUT CONTRAST INDICATION:  E23.6: Other disorders of pituitary gland COMPARISON: MRIs dated 9/4/2022 and 9/1/2022. TECHNIQUE: Multiplanar, multisequence imaging of the brain and sella was performed before and after gadolinium administration. Targeted images of the sella were performed requiring additional time at acquisition and interpretation of approximately 25% IV Contrast:  8 mL of Gadobutrol injection (SINGLE-DOSE) IMAGE QUALITY:  Diagnostic. FINDINGS: BRAIN PARENCHYMA:  There is no discrete mass, mass effect or midline shift. Brainstem and cerebellum demonstrate normal signal. There is no intracranial hemorrhage. There is no evidence of acute infarction and diffusion imaging is unremarkable. Small scattered foci of T2/FLAIR hyperintensity in the periventricular and subcortical matter due to mild chronic microangiopathy. Normal postcontrast imaging. VENTRICLES:  Normal for the patient's age. SELLA AND PITUITARY GLAND: Stable heterogeneously enhancing pituitary mass measuring 1.7 x 1.4 x 1.5 cm (AP, transverse, craniocaudal) consistent with pituitary macroadenoma with mild expansion of the sella. . No parasellar invasion. Pituitary stalk is displaced to the right. No mass effect on the optic chiasm or optic. ORBITS:  Normal. PARANASAL SINUSES:  Normal. VASCULATURE:  Evaluation of the major intracranial vasculature demonstrates appropriate flow voids. CALVARIUM AND SKULL BASE:  Normal. EXTRACRANIAL SOFT TISSUES:  Normal.     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 Mild chronic microangiopathy. Workstation performed: [id number]       I have personally reviewed pertinent reports. and I have personally reviewed pertinent films in PACS

## 2023-10-23 NOTE — ASSESSMENT & PLAN NOTE
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 Mild chronic microangiopathy. Plan:  Imaging reviewed with patient. Demonstrates stable pituitary macroadenoma without compression of the optic chiasm. He follows with endocrine, last seen 9/2022; labs completed at that time WNL aside from low testestorone and low T4. Plan for repeat labs per their recommendations. Has f/u in Nov 2023  Has regular eye checkups, but has not completed visual fields recently. Would defer visual field testing to ophthalmologist/optometrist if clinically warranted as there is no optic chiasm compression.   No surgery recommended at this time, continue with serial imaging and monitoring  Plan for follow up in about 1 year with repeat MRI brain pituitary w wo contrast with AP or sooner if patient develops worsening symptoms or red flag signs

## 2023-11-02 ENCOUNTER — OFFICE VISIT (OUTPATIENT)
Dept: ENDOCRINOLOGY | Facility: CLINIC | Age: 68
End: 2023-11-02
Payer: COMMERCIAL

## 2023-11-02 VITALS
BODY MASS INDEX: 27.19 KG/M2 | DIASTOLIC BLOOD PRESSURE: 82 MMHG | WEIGHT: 179.4 LBS | HEIGHT: 68 IN | SYSTOLIC BLOOD PRESSURE: 124 MMHG

## 2023-11-02 DIAGNOSIS — E03.8 SECONDARY HYPOTHYROIDISM: Primary | ICD-10-CM

## 2023-11-02 DIAGNOSIS — D35.2 PITUITARY MACROADENOMA (HCC): ICD-10-CM

## 2023-11-02 PROCEDURE — 99214 OFFICE O/P EST MOD 30 MIN: CPT | Performed by: INTERNAL MEDICINE

## 2023-11-02 RX ORDER — LEVOTHYROXINE SODIUM 0.03 MG/1
25 TABLET ORAL DAILY
Qty: 90 TABLET | Refills: 31 | Status: SHIPPED | OUTPATIENT
Start: 2023-11-02

## 2023-11-02 NOTE — PATIENT INSTRUCTIONS
Please start the levothyroxine 25mcg once daily. Please take this first in the morning, with only water, and wait 30-60mins to eat or take food or other medications. If you start to feel dizzy, lightheaded, fatigied, or have stomach pain or nausea, then please let me know. Sometimes when you increase the thyroid levels, then the body breaks down the cortisol faster.

## 2023-11-02 NOTE — PROGRESS NOTES
Established Patient Progress Note    Chief Complaint   Patient presents with    Pituitary Adenoma         History of Present Illness:   72 yo M presenting for followup of pituitary adenoma. He was last seen in office in Sept 2022. Mr. Garrett Ruiz has been following for a 1.7cm pituitary adenoma which was initially discovered on MRI in 12/2020 for hearing loss. He has been evaluated by neurosurgery and by ophthalmology. His last NS visit was 10/2023 and he his MRI in October 2023 is stable. Eyes are regularly checked and has no periphral vision loss. He denies fatigue, muscle weakness, changes in shaving or erectile dysfunction. Testosterone gel was discussed in the past, but he feels well and did not initiate this. On his Sept 2023 labs, the Free t4 is lower. His cholesterol is also higher though he was not taking any statin.       He denies weight weight changes, constipation, dry skin, or mood disturbances        Patient Active Problem List   Diagnosis    Hyperlipidemia    Prediabetes    Overweight with body mass index (BMI) of 27 to 27.9 in adult    GERD (gastroesophageal reflux disease)    History of colon polyps    Screening for malignant neoplasm of colon    Asymptomatic varicose veins of both lower extremities    Chronic left shoulder pain    Plantar fasciitis of right foot    Increased prostate specific antigen (PSA) velocity    Need for influenza vaccination    Gastroesophageal reflux disease without esophagitis    Healthcare maintenance    Congestion of both ears    Pituitary mass ([address])    Wellness examination    Venous insufficiency    Medicare annual wellness visit, subsequent    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[address])    Type 2 diabetes mellitus without complication, without long-term current use of insulin (HCC)    Symptomatic varicose veins of left lower extremity    Secondary hypothyroidism     Past Medical History:   Diagnosis Date    Benign neoplasm of colon     Colon polyp     GERD (gastroesophageal reflux disease)     mild diet controlled    Hyperlipidemia     Pituitary mass [facility])       Past Surgical History:   Procedure Laterality Date    INGUINAL HERNIA REPAIR Bilateral     MOHS SURGERY  2000    nose    [place] COLONOSCOPY FLX DX W/COLLJ SPEC WHEN PFRMD N/A 12/18/2018    Procedure: COLONOSCOPY;  Surgeon: Sepideh Ventura MD;  Location[facility]; Service: Gastroenterology      Family History   Problem Relation Age of Onset    Varicose Veins Mother     Hypertension Mother     Parkinsonism Father     Hypertension Father     No Known Problems Sister     No Known Problems Brother     No Known Problems Sister     No Known Problems Sister     No Known Problems Sister     No Known Problems Sister     No Known Problems Brother      Social History     Tobacco Use    Smoking status: Never    Smokeless tobacco: Never   Substance Use Topics    Alcohol use: Yes     Alcohol/week: 14.0 standard drinks of alcohol     Types: 14 Cans of beer per week     Comment: weekends, sometimes     No Known Allergies    Review of Systems   Constitutional:  Negative for fatigue and unexpected weight change. HENT:  Negative for trouble swallowing and voice change. Eyes:  Negative for visual disturbance. Gastrointestinal:  Negative for constipation and diarrhea. Genitourinary:         Nocturia   Musculoskeletal:  Negative for gait problem. Skin:         Denies changes in hair/skin/nails   Neurological:  Negative for dizziness and light-headedness. Psychiatric/Behavioral:  Negative for sleep disturbance. The patient is not nervous/anxious. Current Outpatient Medications:     levothyroxine (Euthyrox) 25 mcg tablet, Take 1 tablet (25 mcg total) by mouth daily, Disp: 90 tablet, Rfl: 31    rosuvastatin (CRESTOR) 5 mg tablet, Take 1 tablet (5 mg total) by mouth daily, Disp: 90 tablet, Rfl: 1    Physical Exam:  Body mass index is 27.28 kg/m².   /82 (BP Location: Left arm, Patient Position: Sitting, Cuff Size: Adult)   Ht 5' 8" (1.727 m)   Wt 81.4 kg (179 lb 6.4 oz)   BMI 27.28 kg/m²        Physical Exam   Gen: appears well-developed and well-nourished. No apparent distress. Head: Normocephalic and atraumatic. Eyes: no stare or proptosis, no periorbital edema  E/N/M nl facies, hearing grossly intact  Neck: range of motion nl. Pulmonary/Chest: breathing  comfortably, no accessory muscle use, effort normal.   Musculoskeletal: moves all 4 extremities, gait nl  Neurological: alert and oriented to person, place, and time. No upper ext tremor appreciated  Skin: does not appear diaphoretic, no facial plethora  Psychiatric: normal mood and affect; behavior is normal; no gross lapses in memory, answer questions appropriately          Labs:           Lab Results   Component Value Date    CWD8KVPDSPPF 2.491 09/06/2023       Lab Results   Component Value Date    CREATININE 1.09 09/06/2023    CREATININE 1.20 09/20/2022    CREATININE 1.26 08/25/2022    BUN 14 09/06/2023    K 4.0 09/06/2023     09/06/2023    CO2 22 09/06/2023          Latest Reference Range & Units 09/06/23 07:27   ACTH 7.2 - 63.3 pg/mL 25.4   Cortisol, Random ug/dL 11.9   INSULIN-LIKE GROWTH FACTOR-1 59 - 230 ng/mL 121      Latest Reference Range & Units 09/06/23 07:27   Testosterone, Total, LC/ - 916 ng/dL 261 (L)   TESTOSTERONE FREE 6.6 - 18.1 pg/mL 7.7   TSH 3RD GENERATON 0.450 - 4.500 uIU/mL 2.491   Free T4 0.61 - 1.12 ng/dL 0.59 (L)   (L): Data is abnormally low              Radiology  10/2023  SELLA AND PITUITARY GLAND: Stable heterogeneously enhancing pituitary mass measuring 1.7 x 1.4 x 1.5 cm (AP, transverse, craniocaudal) consistent with pituitary macroadenoma with mild expansion of the sella. . No parasellar invasion. Pituitary stalk is   displaced to the right. No mass effect on the optic chiasm or optic. Impression:  1. Secondary hypothyroidism    2.  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[address])         Plan:    Diagnoses and all orders for this visit:    Pituitary macroadenoma [facility]): MRIs and eye exam are stable       2. Secondary Testosterone deficiency: not currently on testo replacement. Will call the office if he wants to start this. 3. Seconary hypothyroidism: Free T4 has been trending down. Given his cholesterol, I recommend starting levothyroxine 25mcg daily. Discussed that starting levothyroxine may cause increase of cortisol metabolism and asked him to contact the office if he develops symptoms suggestive of adrenal insufficieny (provided in AVS). Of note, he is planning a trip back to [facility] from December 2023- April 2024    All questioned fully answered. He will call me if any problems arise.

## 2023-11-07 PROBLEM — Z00.00 MEDICARE ANNUAL WELLNESS VISIT, SUBSEQUENT: Status: RESOLVED | Noted: 2023-09-08 | Resolved: 2023-11-07

## 2023-12-06 LAB
LEFT EYE DIABETIC RETINOPATHY: NORMAL
RIGHT EYE DIABETIC RETINOPATHY: NORMAL

## 2023-12-12 ENCOUNTER — APPOINTMENT (OUTPATIENT)
Dept: LAB | Facility: AMBULARY SURGERY CENTER | Age: 68
End: 2023-12-12
Payer: COMMERCIAL

## 2023-12-12 DIAGNOSIS — E11.9 TYPE 2 DIABETES MELLITUS WITHOUT COMPLICATION, WITHOUT LONG-TERM CURRENT USE OF INSULIN (HCC): ICD-10-CM

## 2023-12-12 LAB
ALBUMIN SERPL BCP-MCNC: 4.6 G/DL (ref 3.5–5)
ALP SERPL-CCNC: 52 U/L (ref 34–104)
ALT SERPL W P-5'-P-CCNC: 32 U/L (ref 7–52)
ANION GAP SERPL CALCULATED.3IONS-SCNC: 13 MMOL/L
AST SERPL W P-5'-P-CCNC: 27 U/L (ref 13–39)
BILIRUB SERPL-MCNC: 0.61 MG/DL (ref 0.2–1)
BUN SERPL-MCNC: 16 MG/DL (ref 5–25)
CALCIUM SERPL-MCNC: 9.5 MG/DL (ref 8.4–10.2)
CHLORIDE SERPL-SCNC: 105 MMOL/L (ref 96–108)
CHOLEST SERPL-MCNC: 157 MG/DL
CO2 SERPL-SCNC: 22 MMOL/L (ref 21–32)
CREAT SERPL-MCNC: 1.37 MG/DL (ref 0.6–1.3)
CREAT UR-MCNC: 168.6 MG/DL
EST. AVERAGE GLUCOSE BLD GHB EST-MCNC: 126 MG/DL
GFR SERPL CREATININE-BSD FRML MDRD: 52 ML/MIN/1.73SQ M
GLUCOSE P FAST SERPL-MCNC: 139 MG/DL (ref 65–99)
HBA1C MFR BLD: 6 %
HDLC SERPL-MCNC: 43 MG/DL
LDLC SERPL CALC-MCNC: 86 MG/DL (ref 0–100)
MICROALBUMIN UR-MCNC: 34.3 MG/L
MICROALBUMIN/CREAT 24H UR: 20 MG/G CREATININE (ref 0–30)
POTASSIUM SERPL-SCNC: 4 MMOL/L (ref 3.5–5.3)
PROT SERPL-MCNC: 7.3 G/DL (ref 6.4–8.4)
SODIUM SERPL-SCNC: 140 MMOL/L (ref 135–147)
TRIGL SERPL-MCNC: 141 MG/DL

## 2023-12-12 PROCEDURE — 36415 COLL VENOUS BLD VENIPUNCTURE: CPT

## 2023-12-12 PROCEDURE — 82570 ASSAY OF URINE CREATININE: CPT

## 2023-12-12 PROCEDURE — 82043 UR ALBUMIN QUANTITATIVE: CPT

## 2023-12-12 PROCEDURE — 80061 LIPID PANEL: CPT

## 2023-12-12 PROCEDURE — 80053 COMPREHEN METABOLIC PANEL: CPT

## 2023-12-12 PROCEDURE — 83036 HEMOGLOBIN GLYCOSYLATED A1C: CPT

## 2023-12-15 ENCOUNTER — TELEPHONE (OUTPATIENT)
Dept: INTERNAL MEDICINE CLINIC | Facility: CLINIC | Age: 68
End: 2023-12-15

## 2023-12-15 ENCOUNTER — OFFICE VISIT (OUTPATIENT)
Dept: INTERNAL MEDICINE CLINIC | Facility: CLINIC | Age: 68
End: 2023-12-15
Payer: COMMERCIAL

## 2023-12-15 VITALS
HEART RATE: 63 BPM | WEIGHT: 173.2 LBS | DIASTOLIC BLOOD PRESSURE: 82 MMHG | HEIGHT: 68 IN | OXYGEN SATURATION: 97 % | BODY MASS INDEX: 26.25 KG/M2 | SYSTOLIC BLOOD PRESSURE: 120 MMHG

## 2023-12-15 DIAGNOSIS — E23.6 PITUITARY MASS (HCC): ICD-10-CM

## 2023-12-15 DIAGNOSIS — E78.5 HYPERLIPIDEMIA, UNSPECIFIED HYPERLIPIDEMIA TYPE: ICD-10-CM

## 2023-12-15 DIAGNOSIS — E11.9 TYPE 2 DIABETES MELLITUS WITHOUT COMPLICATION, WITHOUT LONG-TERM CURRENT USE OF INSULIN (HCC): Primary | ICD-10-CM

## 2023-12-15 DIAGNOSIS — E66.3 OVERWEIGHT WITH BODY MASS INDEX (BMI) OF 27 TO 27.9 IN ADULT: ICD-10-CM

## 2023-12-15 DIAGNOSIS — E03.8 SECONDARY HYPOTHYROIDISM: ICD-10-CM

## 2023-12-15 PROCEDURE — 99214 OFFICE O/P EST MOD 30 MIN: CPT | Performed by: INTERNAL MEDICINE

## 2023-12-15 NOTE — PROGRESS NOTES
Assessment/Plan:    Type 2 diabetes mellitus without complication, without long-term current use of insulin (HCC)    Lab Results   Component Value Date    HGBA1C 6.0 (H) 12/12/2023   I have counselled the pt to follow a healthy and balanced diet ,and recommend routine exercise.  I will be ordering diabetic laboratories including comprehensive metabolic panel, hemoglobin A1c, urine microalbumin, lipid panel.  Annual eye examination    Secondary hypothyroidism  Hypothyroidism controlled the patient is currently euthyroid I will be ordering a TSH prior to the next office visit and the patient will continue with current medical regiment; we will continue to monitor the patient's progress.  Continue levothyroxine 25 mcg once daily    Overweight with body mass index (BMI) of 27 to 27.9 in adult  I have counselled the pt to follow a healthy and balanced diet ,and recommend routine exercise.  Making excellent progress now exercising routinely    Hyperlipidemia  Hyperlipidemia controlled continue with current medical regiment recommend a low-cholesterol diet and recommend routine exercise we will continue to monitor the progress.  Continue Crestor 5 mg once daily    Pituitary mass (HCC)  Clinically patient is stable continue to monitor continue routine follow-up with endocrinology and neurosurgery reviewed reports         Problem List Items Addressed This Visit        Endocrine    Type 2 diabetes mellitus without complication, without long-term current use of insulin (HCC) - Primary       Lab Results   Component Value Date    HGBA1C 6.0 (H) 12/12/2023   I have counselled the pt to follow a healthy and balanced diet ,and recommend routine exercise.  I will be ordering diabetic laboratories including comprehensive metabolic panel, hemoglobin A1c, urine microalbumin, lipid panel.  Annual eye examination         Relevant Orders    Comprehensive metabolic panel    Lipid Panel with Direct LDL reflex    Hemoglobin A1c (w/out EAG)  ([facility] ONLY)    Microalbumin, Random Urine (W/Creatinine) ([facility] ONLY)    Secondary hypothyroidism     Hypothyroidism controlled the patient is currently euthyroid I will be ordering a TSH prior to the next office visit and the patient will continue with current medical regiment; we will continue to monitor the patient's progress.  Continue levothyroxine 25 mcg once daily            Other    Hyperlipidemia     Hyperlipidemia controlled continue with current medical regiment recommend a low-cholesterol diet and recommend routine exercise we will continue to monitor the progress.  Continue Crestor 5 mg once daily         Overweight with body mass index (BMI) of 27 to 27.9 in adult     I have counselled the pt to follow a healthy and balanced diet ,and recommend routine exercise.  Making excellent progress now exercising routinely         Pituitary mass (HCC)     Clinically patient is stable continue to monitor continue routine follow-up with endocrinology and neurosurgery reviewed reports            Return to office  6 months  call if any problems  Subjective:      Patient ID: Solomon Meyers is a 68 y.o. male.    HPI 68-year old male coming in for a follow up visit regarding type 2 diabetes, hypothyroidism, hyperlipidemia, pituitary adenoma, overweight; the patient reports me compliant taking medications without untoward side effects the.  The patient is here to review his medical condition, update me on the medical condition and the patient reports me no hospitalizations and no ER visits.  No injuries no illnesses he has made significant improvements in his diet and exercise he is now exercising routinely he is lost weight secondary to diet exercise.  Here to review laboratories in detail.  He is working with endocrinology regarding pituitary microadenoma.  Currently taking levothyroxine for secondary hypothyroidism he is working with endocrinologist Dr. Bryant.  Also patient is recently seen neurosurgery plan is to  follow-up MRI in 1 year.    The following portions of the patient's history were reviewed and updated as appropriate: allergies, current medications, past family history, past medical history, past social history, past surgical history and problem list.    Review of Systems   Constitutional:  Negative for activity change, appetite change and unexpected weight change.   HENT:  Negative for congestion and postnasal drip.    Eyes:  Negative for visual disturbance.   Respiratory:  Negative for cough and shortness of breath.    Cardiovascular:  Negative for chest pain.   Gastrointestinal:  Negative for abdominal pain, diarrhea, nausea and vomiting.   Neurological:  Negative for dizziness, light-headedness and headaches.   Hematological:  Negative for adenopathy.         Objective:    Return in about 6 months (around 6/15/2024).    No results found.      No Known Allergies    Past Medical History:   Diagnosis Date   • Benign neoplasm of colon    • Colon polyp    • GERD (gastroesophageal reflux disease)     mild diet controlled   • Hyperlipidemia    • Pituitary mass (HCC)      Past Surgical History:   Procedure Laterality Date   • INGUINAL HERNIA REPAIR Bilateral    • MOHS SURGERY  2000    nose   • [place] COLONOSCOPY FLX DX W/COLLJ SPEC WHEN PFRMD N/A 12/18/2018    Procedure: COLONOSCOPY;  Surgeon: Kevin Flores MD;  Location: [facility];  Service: Gastroenterology     Current Outpatient Medications on File Prior to Visit   Medication Sig Dispense Refill   • levothyroxine (Euthyrox) 25 mcg tablet Take 1 tablet (25 mcg total) by mouth daily 90 tablet 31   • rosuvastatin (CRESTOR) 5 mg tablet Take 1 tablet (5 mg total) by mouth daily 90 tablet 1     No current facility-administered medications on file prior to visit.     Family History   Problem Relation Age of Onset   • Varicose Veins Mother    • Hypertension Mother    • Parkinsonism Father    • Hypertension Father    • No Known Problems Sister    • No Known Problems Brother  "   • No Known Problems Sister    • No Known Problems Sister    • No Known Problems Sister    • No Known Problems Sister    • No Known Problems Brother      Social History     Socioeconomic History   • Marital status: /Civil Union     Spouse name: Not on file   • Number of children: Not on file   • Years of education: Not on file   • Highest education level: Not on file   Occupational History   • Not on file   Tobacco Use   • Smoking status: Never   • Smokeless tobacco: Never   Vaping Use   • Vaping status: Never Used   Substance and Sexual Activity   • Alcohol use: Yes     Alcohol/week: 14.0 standard drinks of alcohol     Types: 14 Cans of beer per week     Comment: weekends, sometimes   • Drug use: No   • Sexual activity: Yes   Other Topics Concern   • Not on file   Social History Narrative    Dog      Social Determinants of Health     Financial Resource Strain: Low Risk  (9/8/2023)    Overall Financial Resource Strain (CARDIA)    • Difficulty of Paying Living Expenses: Not hard at all   Food Insecurity: Not on file   Transportation Needs: No Transportation Needs (9/8/2023)    PRAPARE - Transportation    • Lack of Transportation (Medical): No    • Lack of Transportation (Non-Medical): No   Physical Activity: Not on file   Stress: Not on file   Social Connections: Not on file   Intimate Partner Violence: Not on file   Housing Stability: Not on file     Vitals:    12/15/23 1345   BP: 120/82   Pulse: 63   SpO2: 97%   Weight: 78.6 kg (173 lb 3.2 oz)   Height: 5' 8\" (1.727 m)     Results for orders placed or performed in visit on 12/12/23   Hemoglobin A1C   Result Value Ref Range    Hemoglobin A1C 6.0 (H) Normal 4.0-5.6%; PreDiabetic 5.7-6.4%; Diabetic >=6.5%; Glycemic control for adults with diabetes <7.0% %     mg/dl   Comprehensive metabolic panel   Result Value Ref Range    Sodium 140 135 - 147 mmol/L    Potassium 4.0 3.5 - 5.3 mmol/L    Chloride 105 96 - 108 mmol/L    CO2 22 21 - 32 mmol/L    ANION " "GAP 13 mmol/L    BUN 16 5 - 25 mg/dL    Creatinine 1.37 (H) 0.60 - 1.30 mg/dL    Glucose, Fasting 139 (H) 65 - 99 mg/dL    Calcium 9.5 8.4 - 10.2 mg/dL    AST 27 13 - 39 U/L    ALT 32 7 - 52 U/L    Alkaline Phosphatase 52 34 - 104 U/L    Total Protein 7.3 6.4 - 8.4 g/dL    Albumin 4.6 3.5 - 5.0 g/dL    Total Bilirubin 0.61 0.20 - 1.00 mg/dL    eGFR 52 ml/min/1.73sq m   Lipid Panel with Direct LDL reflex   Result Value Ref Range    Cholesterol 157 See Comment mg/dL    Triglycerides 141 See Comment mg/dL    HDL, Direct 43 >=40 mg/dL    LDL Calculated 86 0 - 100 mg/dL   Albumin / creatinine urine ratio   Result Value Ref Range    Creatinine, Ur 168.6 Reference range not established. mg/dL    Albumin,U,Random 34.3 (H) <20.0 mg/L    Albumin Creat Ratio 20 0 - 30 mg/g creatinine     Weight (last 2 days)     Date/Time Weight    12/15/23 1345 78.6 (173.2)        Body mass index is 26.33 kg/m².  BP      Temp      Pulse     Resp      SpO2        Vitals:    12/15/23 1345   Weight: 78.6 kg (173 lb 3.2 oz)     Vitals:    12/15/23 1345   Weight: 78.6 kg (173 lb 3.2 oz)       /82   Pulse 63   Ht 5' 8\" (1.727 m)   Wt 78.6 kg (173 lb 3.2 oz)   SpO2 97%   BMI 26.33 kg/m²          Physical Exam  Vitals and nursing note reviewed.   Constitutional:       General: He is not in acute distress.     Appearance: He is well-developed. He is not diaphoretic.   HENT:      Head: Normocephalic and atraumatic.      Right Ear: External ear normal.      Left Ear: External ear normal.   Eyes:      General: No scleral icterus.        Right eye: No discharge.         Left eye: No discharge.      Conjunctiva/sclera: Conjunctivae normal.      Pupils: Pupils are equal, round, and reactive to light.   Cardiovascular:      Rate and Rhythm: Normal rate and regular rhythm.      Heart sounds: Normal heart sounds. No murmur heard.     No friction rub. No gallop.   Pulmonary:      Effort: No respiratory distress.      Breath sounds: No wheezing or " rales.   Abdominal:      General: Bowel sounds are normal. There is no distension.      Palpations: Abdomen is soft. There is no mass.      Tenderness: There is no abdominal tenderness. There is no guarding or rebound.   Musculoskeletal:         General: No deformity.      Cervical back: Neck supple.   Lymphadenopathy:      Cervical: No cervical adenopathy.   Neurological:      Mental Status: He is alert.

## 2023-12-15 NOTE — TELEPHONE ENCOUNTER
Pt states he was told to do bloodwork prior to his April appt but everything ordered today was dated for the Spring, please advise/enter new lab orders for what he would need to do for April

## 2023-12-17 DIAGNOSIS — E11.9 TYPE 2 DIABETES MELLITUS WITHOUT COMPLICATION, WITHOUT LONG-TERM CURRENT USE OF INSULIN (HCC): Primary | ICD-10-CM

## 2023-12-17 NOTE — ASSESSMENT & PLAN NOTE
Hyperlipidemia controlled continue with current medical regiment recommend a low-cholesterol diet and recommend routine exercise we will continue to monitor the progress.  Continue Crestor 5 mg once daily

## 2023-12-17 NOTE — ASSESSMENT & PLAN NOTE
Hypothyroidism controlled the patient is currently euthyroid I will be ordering a TSH prior to the next office visit and the patient will continue with current medical regiment; we will continue to monitor the patient's progress.  Continue levothyroxine 25 mcg once daily

## 2023-12-17 NOTE — ASSESSMENT & PLAN NOTE
Lab Results   Component Value Date    HGBA1C 6.0 (H) 12/12/2023   I have counselled the pt to follow a healthy and balanced diet ,and recommend routine exercise.  I will be ordering diabetic laboratories including comprehensive metabolic panel, hemoglobin A1c, urine microalbumin, lipid panel.  Annual eye examination

## 2023-12-17 NOTE — ASSESSMENT & PLAN NOTE
I have counselled the pt to follow a healthy and balanced diet ,and recommend routine exercise.  Making excellent progress now exercising routinely

## 2023-12-17 NOTE — ASSESSMENT & PLAN NOTE
Clinically patient is stable continue to monitor continue routine follow-up with endocrinology and neurosurgery reviewed reports

## 2024-02-21 PROBLEM — Z00.00 HEALTHCARE MAINTENANCE: Status: RESOLVED | Noted: 2020-07-20 | Resolved: 2024-02-21

## 2024-02-21 PROBLEM — Z12.11 SCREENING FOR MALIGNANT NEOPLASM OF COLON: Status: RESOLVED | Noted: 2018-11-21 | Resolved: 2024-02-21

## 2024-02-27 ENCOUNTER — OFFICE VISIT (OUTPATIENT)
Dept: INTERNAL MEDICINE CLINIC | Facility: CLINIC | Age: 69
End: 2024-02-27
Payer: COMMERCIAL

## 2024-02-27 ENCOUNTER — HOSPITAL ENCOUNTER (OUTPATIENT)
Dept: RADIOLOGY | Facility: HOSPITAL | Age: 69
Discharge: HOME | End: 2024-02-27
Payer: COMMERCIAL

## 2024-02-27 ENCOUNTER — TELEPHONE (OUTPATIENT)
Age: 69
End: 2024-02-27

## 2024-02-27 VITALS
HEART RATE: 74 BPM | WEIGHT: 168.2 LBS | DIASTOLIC BLOOD PRESSURE: 78 MMHG | OXYGEN SATURATION: 98 % | RESPIRATION RATE: 16 BRPM | SYSTOLIC BLOOD PRESSURE: 112 MMHG | HEIGHT: 68 IN | BODY MASS INDEX: 25.49 KG/M2

## 2024-02-27 DIAGNOSIS — M25.511 ACUTE PAIN OF RIGHT SHOULDER: Primary | ICD-10-CM

## 2024-02-27 DIAGNOSIS — E11.9 TYPE 2 DIABETES MELLITUS WITHOUT COMPLICATION, WITHOUT LONG-TERM CURRENT USE OF INSULIN (HCC): ICD-10-CM

## 2024-02-27 DIAGNOSIS — M25.511 ACUTE PAIN OF RIGHT SHOULDER: ICD-10-CM

## 2024-02-27 PROBLEM — E23.6 PITUITARY MASS (HCC): Status: RESOLVED | Noted: 2021-03-01 | Resolved: 2024-02-27

## 2024-02-27 PROBLEM — D35.2 PITUITARY MACROADENOMA (HCC): Status: RESOLVED | Noted: 2023-09-08 | Resolved: 2024-02-27

## 2024-02-27 PROCEDURE — 73030 X-RAY EXAM OF SHOULDER: CPT

## 2024-02-27 PROCEDURE — 99213 OFFICE O/P EST LOW 20 MIN: CPT | Performed by: INTERNAL MEDICINE

## 2024-02-27 NOTE — PROGRESS NOTES
Assessment/Plan:    Type 2 diabetes mellitus without complication, without long-term current use of insulin (HCC)    Lab Results   Component Value Date    HGBA1C 6.0 (H) 12/12/2023   I have counselled the pt to follow a healthy and balanced diet ,and recommend routine exercise.    Acute pain of right shoulder  Status post dislocation he was in the ER and they reset his shoulder currently has limited range of motion will check x-ray right shoulder suspect frozen shoulder will refer to orthopedics Dr. Burroughs range of motion exercises ice RTO as scheduled call if any problems         Problem List Items Addressed This Visit        Endocrine    Type 2 diabetes mellitus without complication, without long-term current use of insulin (HCC)       Lab Results   Component Value Date    HGBA1C 6.0 (H) 12/12/2023   I have counselled the pt to follow a healthy and balanced diet ,and recommend routine exercise.            Other    Acute pain of right shoulder - Primary     Status post dislocation he was in the ER and they reset his shoulder currently has limited range of motion will check x-ray right shoulder suspect frozen shoulder will refer to orthopedics Dr. Burroughs range of motion exercises ice RTO as scheduled call if any problems         Relevant Orders    XR shoulder 2+ vw right (Completed)    Ambulatory Referral to Orthopedic Surgery           Subjective:      Patient ID: Solomon Meyers is a 68 y.o. male.    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  He has had limitation of range of motion and pain since that point in time r shoulder injury fall and dislocation in [place] , right handed , slowly improving .    The following portions of the patient's history  were reviewed and updated as appropriate: allergies, current medications, past family history, past medical history, past social history, past surgical history and problem list.    Review of Systems   Constitutional:  Negative for activity change, appetite change and unexpected weight change.   Eyes:  Negative for visual disturbance.   Respiratory:  Negative for cough and shortness of breath.    Cardiovascular:  Negative for chest pain.   Gastrointestinal:  Negative for abdominal pain, diarrhea, nausea and vomiting.   Musculoskeletal:         Right shoulder pain/limitation of range of motion   Neurological:  Negative for dizziness, light-headedness and headaches.         Objective:    Return if symptoms worsen or fail to improve, for Next scheduled follow up.    Procedure: XR shoulder 2+ vw right    Result Date: 2/27/2024  Narrative: RIGHT SHOULDER INDICATION:   Pain in right shoulder. COMPARISON:  None. VIEWS:  XR SHOULDER 2+ VW RIGHT Images: 3 FINDINGS: There is no acute fracture or dislocation. Mild osteoarthritis acromioclavicular joint. No lytic or blastic osseous lesion. Soft tissues are unremarkable.     Impression: No acute osseous abnormality. Degenerative changes as described. Electronically signed: 02/27/2024 11:56 AM Stevan Harrell, MPH,MD       No Known Allergies    Past Medical History:   Diagnosis Date   • Benign neoplasm of colon    • Colon polyp    • GERD (gastroesophageal reflux disease)     mild diet controlled   • Hyperlipidemia    • Pituitary mass (HCC)      Past Surgical History:   Procedure Laterality Date   • INGUINAL HERNIA REPAIR Bilateral    • MOHS SURGERY  2000    nose   • [place] COLONOSCOPY FLX DX W/COLLJ SPEC WHEN PFRMD N/A 12/18/2018    Procedure: COLONOSCOPY;  Surgeon: Kevin Flores MD;  Location: [facility];  Service: Gastroenterology     Current Outpatient Medications on File Prior to Visit   Medication Sig Dispense Refill   • levothyroxine (Euthyrox) 25 mcg tablet Take 1 tablet (25  "mcg total) by mouth daily 90 tablet 31   • rosuvastatin (CRESTOR) 5 mg tablet Take 1 tablet (5 mg total) by mouth daily 90 tablet 1     No current facility-administered medications on file prior to visit.     Family History   Problem Relation Age of Onset   • Varicose Veins Mother    • Hypertension Mother    • Parkinsonism Father    • Hypertension Father    • No Known Problems Sister    • No Known Problems Brother    • No Known Problems Sister    • No Known Problems Sister    • No Known Problems Sister    • No Known Problems Sister    • No Known Problems Brother      Social History     Socioeconomic History   • Marital status: /Civil Union     Spouse name: Not on file   • Number of children: Not on file   • Years of education: Not on file   • Highest education level: Not on file   Occupational History   • Not on file   Tobacco Use   • Smoking status: Never   • Smokeless tobacco: Never   Vaping Use   • Vaping status: Never Used   Substance and Sexual Activity   • Alcohol use: Yes     Alcohol/week: 14.0 standard drinks of alcohol     Types: 14 Cans of beer per week     Comment: weekends, sometimes   • Drug use: No   • Sexual activity: Yes   Other Topics Concern   • Not on file   Social History Narrative    Dog      Social Determinants of Health     Financial Resource Strain: Low Risk  (9/8/2023)    Overall Financial Resource Strain (CARDIA)    • Difficulty of Paying Living Expenses: Not hard at all   Food Insecurity: Not on file   Transportation Needs: No Transportation Needs (9/8/2023)    PRAPARE - Transportation    • Lack of Transportation (Medical): No    • Lack of Transportation (Non-Medical): No   Physical Activity: Not on file   Stress: Not on file   Social Connections: Not on file   Intimate Partner Violence: Not on file   Housing Stability: Not on file     Vitals:    02/27/24 1006   BP: 112/78   Pulse: 74   Resp: 16   SpO2: 98%   Weight: 76.3 kg (168 lb 3.2 oz)   Height: 5' 8\" (1.727 m)     Results for " "orders placed or performed in visit on 12/12/23   Hemoglobin A1C   Result Value Ref Range    Hemoglobin A1C 6.0 (H) Normal 4.0-5.6%; PreDiabetic 5.7-6.4%; Diabetic >=6.5%; Glycemic control for adults with diabetes <7.0% %     mg/dl   Comprehensive metabolic panel   Result Value Ref Range    Sodium 140 135 - 147 mmol/L    Potassium 4.0 3.5 - 5.3 mmol/L    Chloride 105 96 - 108 mmol/L    CO2 22 21 - 32 mmol/L    ANION GAP 13 mmol/L    BUN 16 5 - 25 mg/dL    Creatinine 1.37 (H) 0.60 - 1.30 mg/dL    Glucose, Fasting 139 (H) 65 - 99 mg/dL    Calcium 9.5 8.4 - 10.2 mg/dL    AST 27 13 - 39 U/L    ALT 32 7 - 52 U/L    Alkaline Phosphatase 52 34 - 104 U/L    Total Protein 7.3 6.4 - 8.4 g/dL    Albumin 4.6 3.5 - 5.0 g/dL    Total Bilirubin 0.61 0.20 - 1.00 mg/dL    eGFR 52 ml/min/1.73sq m   Lipid Panel with Direct LDL reflex   Result Value Ref Range    Cholesterol 157 See Comment mg/dL    Triglycerides 141 See Comment mg/dL    HDL, Direct 43 >=40 mg/dL    LDL Calculated 86 0 - 100 mg/dL   Albumin / creatinine urine ratio   Result Value Ref Range    Creatinine, Ur 168.6 Reference range not established. mg/dL    Albumin,U,Random 34.3 (H) <20.0 mg/L    Albumin Creat Ratio 20 0 - 30 mg/g creatinine     Weight (last 2 days)     Date/Time Weight    02/27/24 1006 76.3 (168.2)        Body mass index is 25.57 kg/m².  BP      Temp      Pulse     Resp      SpO2        Vitals:    02/27/24 1006   Weight: 76.3 kg (168 lb 3.2 oz)     Vitals:    02/27/24 1006   Weight: 76.3 kg (168 lb 3.2 oz)       /78   Pulse 74   Resp 16   Ht 5' 8\" (1.727 m)   Wt 76.3 kg (168 lb 3.2 oz)   SpO2 98%   BMI 25.57 kg/m²       Examination of the right shoulder very limited internal/external rotation limited abduction   Physical Exam  Constitutional:       General: He is not in acute distress.     Appearance: He is well-developed. He is not diaphoretic.   HENT:      Head: Normocephalic and atraumatic.      Right Ear: External ear normal.      " Left Ear: External ear normal.   Eyes:      General: No scleral icterus.        Right eye: No discharge.         Left eye: No discharge.      Conjunctiva/sclera: Conjunctivae normal.      Pupils: Pupils are equal, round, and reactive to light.   Cardiovascular:      Rate and Rhythm: Normal rate and regular rhythm.      Heart sounds: Normal heart sounds. No murmur heard.     No friction rub. No gallop.   Pulmonary:      Effort: No respiratory distress.      Breath sounds: No wheezing or rales.   Musculoskeletal:         General: No deformity.      Cervical back: Neck supple.   Lymphadenopathy:      Cervical: No cervical adenopathy.   Neurological:      Mental Status: He is alert.

## 2024-02-27 NOTE — TELEPHONE ENCOUNTER
Reason for call:     Patient called stating Dr Bustillos was going to send a medication for his eye lid to the pharmacy. Patient states nothing has been sent yet.       [] Refill   [] Prior Auth  [x] Other:     Office:   [x] PCP/Provider - Dr Bustillos  [] Specialty/Provider -       Pharmacy: [facility]

## 2024-02-28 NOTE — ASSESSMENT & PLAN NOTE
Lab Results   Component Value Date    HGBA1C 6.0 (H) 12/12/2023   I have counselled the pt to follow a healthy and balanced diet ,and recommend routine exercise.

## 2024-02-28 NOTE — ASSESSMENT & PLAN NOTE
Status post dislocation he was in the ER and they reset his shoulder currently has limited range of motion will check x-ray right shoulder suspect frozen shoulder will refer to orthopedics Dr. Burroughs range of motion exercises ice RTO as scheduled call if any problems

## 2024-03-02 ENCOUNTER — OFFICE VISIT (OUTPATIENT)
Dept: OBGYN CLINIC | Facility: CLINIC | Age: 69
End: 2024-03-02
Payer: COMMERCIAL

## 2024-03-02 VITALS
HEART RATE: 69 BPM | WEIGHT: 168 LBS | HEIGHT: 68 IN | BODY MASS INDEX: 25.46 KG/M2 | DIASTOLIC BLOOD PRESSURE: 78 MMHG | SYSTOLIC BLOOD PRESSURE: 112 MMHG

## 2024-03-02 DIAGNOSIS — M25.511 ACUTE PAIN OF RIGHT SHOULDER: Primary | ICD-10-CM

## 2024-03-02 DIAGNOSIS — M75.01 ADHESIVE CAPSULITIS OF RIGHT SHOULDER: ICD-10-CM

## 2024-03-02 DIAGNOSIS — Z87.39 HISTORY OF CLOSED DISLOCATION OF SHOULDER: ICD-10-CM

## 2024-03-02 PROCEDURE — 99203 OFFICE O/P NEW LOW 30 MIN: CPT | Performed by: PHYSICIAN ASSISTANT

## 2024-03-02 NOTE — PATIENT INSTRUCTIONS
Adhesive Capsulitis   WHAT YOU NEED TO KNOW:   Adhesive capsulitis happens when tissues in your shoulder tighten and swell. The condition is often called frozen shoulder because the swollen tissues cause pain and decrease your shoulder movement.  DISCHARGE INSTRUCTIONS:   Return to the emergency department if:   You have new or increased trouble moving your arm.      Contact your healthcare provider if:   You have worse pain and stiffness in your shoulder.    You have questions or concerns about your condition.    Medicines:   Prescription pain medicine  may be given. Ask your healthcare provider how to take this medicine safely. Some prescription pain medicines contain acetaminophen. Do not take other medicines that contain acetaminophen without talking to your healthcare provider. Too much acetaminophen may cause liver damage. Prescription pain medicine may cause constipation. Ask your healthcare provider how to prevent or treat constipation.     NSAIDs  help decrease swelling and pain or fever. This medicine is available with or without a doctor's order. NSAIDs can cause stomach bleeding or kidney problems in certain people. If you take blood thinner medicine, always ask your healthcare provider if NSAIDs are safe for you. Always read the medicine label and follow directions.    Take your medicine as directed.  Contact your healthcare provider if you think your medicine is not helping or if you have side effects. Tell your provider if you are allergic to any medicine. Keep a list of the medicines, vitamins, and herbs you take. Include the amounts, and when and why you take them. Bring the list or the pill bottles to follow-up visits. Carry your medicine list with you in case of an emergency.    Physical therapy  is used to teach you exercises to help improve movement and strength, and to decrease pain.  Stretches to do at home:   Doorway stretch:   a doorway with your painful arm bent at the elbow. Place  your hand on the door frame and turn your body away from the door frame. Hold this position for 30 seconds. Relax and repeat.         Forward stretch:  Lie on your back with your legs straight out. Use your healthy arm to push your painful arm up over your head until you feel a gentle stretch. Hold this position for 15 seconds. Slowly lower your arm to the starting position. Relax and repeat.            Crossover stretch:  Use your healthy arm to gently pull your painful arm across your chest just below your chin. Pull until you feel a gentle stretch. Hold this position for 30 seconds. Relax and repeat.       Apply ice as directed:  Ice helps decrease pain and swelling. Apply ice to help ease pain after stretching. Use an ice pack, or put crushed ice in a plastic bag. Cover it with a towel before you apply it to your shoulder. Apply ice for 15 to 20 minutes every hour, or as directed.  Apply heat as directed:  Heat helps relax muscles and may help improve shoulder movement. Use a heat pack, or soak a small towel in warm water. Wring out the extra water before you apply the towel to your shoulder. Apply heat for 20 to 30 minutes every hour, or as directed.  Follow up with your healthcare provider as directed:  Write down your questions so you remember to ask them during your visits.  © Copyright [employer] 2023 Information is for End User's use only and may not be sold, redistributed or otherwise used for commercial purposes.  The above information is an  only. It is not intended as medical advice for individual conditions or treatments. Talk to your doctor, nurse or pharmacist before following any medical regimen to see if it is safe and effective for you.

## 2024-03-02 NOTE — PROGRESS NOTES
Orthopaedic Surgery - Office Note  Solomon Meyers (68 y.o. male)   : 1955   MRN: [id number]  Encounter Date: 3/2/2024    Chief Complaint   Patient presents with    Right Shoulder - Pain         Assessment/Plan  Diagnoses and all orders for this visit:    Acute pain of right shoulder  -     Ambulatory Referral to Physical Therapy; Future  -     Ambulatory Referral to Orthopedic Surgery    History of closed dislocation of shoulder  -     Ambulatory Referral to Physical Therapy; Future    Adhesive capsulitis of right shoulder  -     Ambulatory Referral to Physical Therapy; Future    The diagnosis as well as treatment options were reviewed with the patient in the office today.  He was advised to first and the most occurs regaining his range of motion.  If he has symptomatic pain after regaining his range of motion we could consider advanced imaging such as MRI.  Cortisone injection could be considered in the future if he is able to make some gains with therapy but plateaus.  If conservative treatments fail manipulation under general anesthesia with surgeon could be considered.    Patient was encouraged to continue strict diabetic control to improve outcomes and decrease risk of complications.    All question and concerns were answered in the office today.     Return for Recheck in 4-6 weeks with myself.        History of Present Illness  This is a new patient with right shoulder pain.  He has a history of a shoulder dislocation and subsequent relocation in the operating room.  This injury occurred in [place] and there are no records available for review.  The injury occurred on 2024.  he has had subsequent loss of motion and developed pain.  He reports that after the injury he did not move the shoulder at all for about 3 weeks at the direction of the physician he saw in [place].  He discussed the symptoms with his PCP on 2024 who ordered an x-ray.  He was referred for evaluation and treatment.  He is  "right-hand dominant.  Patient reports prior to the dislocation he did not have any problems with his right upper extremity.    Patient has a history of diabetes with his most recent A1c being 6.0    Review of Systems  Pertinent items are noted in HPI.  All other systems were reviewed and are negative.    Physical Exam  /78 (BP Location: Left arm, Patient Position: Sitting, Cuff Size: Standard)   Pulse 69   Ht 5' 8\" (1.727 m)   Wt 76.2 kg (168 lb)   BMI 25.54 kg/m²   Cons: Appears well.  No apparent distress.  Psych: Alert. Oriented x3.  Mood and affect normal.  Patient's right shoulder active forward flexion is to 70 degrees in the office.  Passive forward flexion is to 90 degrees with a hard and stop.  Internal rotation is to the buttocks.  External rotation is to 40 degrees.  He has no tenderness to palpation throughout the shoulder.  There is no Ubaldo deformity ecchymosis or signs of trauma.  He is neurovascular intact in the right upper extremity.  His elbow exam is unremarkable with full range of motion.  Cervical neck range of motion is full.  Internal rotation and external rotation strength are at 5 out of 5.  I am unable to test supraspinatus.           Studies Reviewed  RIGHT SHOULDER     INDICATION:   Pain in right shoulder.      COMPARISON:  None.     VIEWS:  XR SHOULDER 2+ VW RIGHT  Images: 3     FINDINGS:     There is no acute fracture or dislocation.     Mild osteoarthritis acromioclavicular joint.     No lytic or blastic osseous lesion.     Soft tissues are unremarkable.     IMPRESSION:        No acute osseous abnormality.     Degenerative changes as described.     Electronically signed: 02/27/2024 11:56 AM Stevan Harrell MPH,MD    Procedures  No procedures today.    Medical, Surgical, Family, and Social History  The patient's medical history, family history, and social history, were reviewed and updated as appropriate.    Past Medical History:   Diagnosis Date    Benign neoplasm of colon  "    Colon polyp     GERD (gastroesophageal reflux disease)     mild diet controlled    Hyperlipidemia     Pituitary mass (HCC)        Past Surgical History:   Procedure Laterality Date    INGUINAL HERNIA REPAIR Bilateral     MOHS SURGERY  2000    nose    OK COLONOSCOPY FLX DX W/COLLJ SPEC WHEN PFRMD N/A 12/18/2018    Procedure: COLONOSCOPY;  Surgeon: Kevin Flores MD;  Location: [facility];  Service: Gastroenterology       Family History   Problem Relation Age of Onset    Varicose Veins Mother     Hypertension Mother     Parkinsonism Father     Hypertension Father     No Known Problems Sister     No Known Problems Brother     No Known Problems Sister     No Known Problems Sister     No Known Problems Sister     No Known Problems Sister     No Known Problems Brother        Social History     Occupational History    Not on file   Tobacco Use    Smoking status: Never    Smokeless tobacco: Never   Vaping Use    Vaping status: Never Used   Substance and Sexual Activity    Alcohol use: Yes     Alcohol/week: 14.0 standard drinks of alcohol     Types: 14 Cans of beer per week     Comment: weekends, sometimes    Drug use: No    Sexual activity: Yes       No Known Allergies      Current Outpatient Medications:     levothyroxine (Euthyrox) 25 mcg tablet, Take 1 tablet (25 mcg total) by mouth daily, Disp: 90 tablet, Rfl: 31    rosuvastatin (CRESTOR) 5 mg tablet, Take 1 tablet (5 mg total) by mouth daily, Disp: 90 tablet, Rfl: 1      Enmanuel Tyson PA-C

## 2024-03-12 ENCOUNTER — EVALUATION (OUTPATIENT)
Dept: PHYSICAL THERAPY | Facility: CLINIC | Age: 69
End: 2024-03-12
Payer: COMMERCIAL

## 2024-03-12 DIAGNOSIS — M75.01 ADHESIVE CAPSULITIS OF RIGHT SHOULDER: ICD-10-CM

## 2024-03-12 DIAGNOSIS — M25.511 ACUTE PAIN OF RIGHT SHOULDER: ICD-10-CM

## 2024-03-12 DIAGNOSIS — Z87.39 HISTORY OF CLOSED DISLOCATION OF SHOULDER: ICD-10-CM

## 2024-03-12 PROCEDURE — 97110 THERAPEUTIC EXERCISES: CPT | Performed by: PHYSICAL THERAPIST

## 2024-03-12 PROCEDURE — 97162 PT EVAL MOD COMPLEX 30 MIN: CPT | Performed by: PHYSICAL THERAPIST

## 2024-03-12 NOTE — PROGRESS NOTES
PT Evaluation     Today's date: 3/12/2024  Patient name: Solomon Meyers  : 1955  MRN: [id number]  Referring provider: Enmanuel Tyson PA*  Dx:   Encounter Diagnosis     ICD-10-CM    1. Acute pain of right shoulder  M25.511 Ambulatory Referral to Physical Therapy      2. History of closed dislocation of shoulder  Z87.39 Ambulatory Referral to Physical Therapy      3. Adhesive capsulitis of right shoulder  M75.01 Ambulatory Referral to Physical Therapy                     Assessment  Assessment details: Solomon Meyers is a 68 y.o. R shoulder pain secondary to an acute fall from a horse.  He presents with pain, painfree weakness,  decreased ROM, and postural dysfunction likely related to RTCT/ nerve injury.  Due to these impairments, patient has difficulty performing a/iadls, recreational activities and engaging in social activities. Patient's clinical presentation is consistent with their referring diagnosis. Patient would benefit from skilled physical therapy to address their aforementioned impairments, improve their level of function and to improve their overall quality of life.  has been given a home exercise program and is in agreement with the plan of care.  Thank you for your referral.     Impairments: impaired physical strength and lacks appropriate home exercise program  Understanding of Dx/Px/POC: excellent  Goals  ST Goals - 2-4 weeks  1. Patient will report decreased pain with activity by at least 2 points within 4 weeks  2. Patient will improve ROM 5-10 degrees within 4 weeks  3. Patient will have increased strength to reach to shoulder height in 4 weeks  4.  Patient will perform IADLs without pain in 4 weeks  5.  Patient will increase strength by 25% in 4 weeks    LT Goals - Discharge  1. Patient will improve FOTO score to maximum stated or greater by discharge  2. Patient will return to preferred recreational activity without significant pain increase by discharge   3.  Patient will return to  all house work related activities without pain by discharge      Plan  Patient would benefit from: skilled physical therapy  Referral necessary: No  Planned therapy interventions: manual therapy, joint mobilization, neuromuscular re-education, strengthening, stretching, therapeutic activities, therapeutic exercise and home exercise program  Frequency: 2x week  Duration in visits: 20  Duration in weeks: 20  Plan of Care beginning date: 3/12/2024  Plan of Care expiration date: 2024  Treatment plan discussed with: patient        Subjective Evaluation    History of Present Illness  Mechanism of injury: Patient reports that he dislocated his R shoulder while riding a horse and his shoulder was reduced and he was placed in a sling x 2 weeks.  He came out of the sling and tried to moving his arm with AAROM with assistance of the L UE  He had an x-ray at the time of the injury and recently with no sign of fx of the shoulder.   CC:  He notes that he has little pain but significant lack of ROM in the shoulder.  Function:  He is retired from [employer].  He is RHD.  He exercises at the gym doing mostly cardio.  He is able to do the elliptical.  He has difficulty with IADLs.  He has some difficulty with dressing.  He notes shoulder is painful if he is laying on his shoulder.   He denies radicular sx or neck pain.   He needs to be able to return to yard work.           Recurrent probem    Quality of life: excellent    Patient Goals  Patient goals for therapy: return to sport/leisure activities, independence with ADLs/IADLs, increased strength, decreased pain and increased motion    Pain  Current pain ratin  At best pain ratin  At worst pain ratin  Aggravating factors: lifting and overhead activity    Social Support    Employment status: not working  Hand dominance: right      Diagnostic Tests  X-ray: normal        Objective     Palpation     Additional Palpation Details  Denies palpable tenderness t/o the shoulder or  neck    Cervical/Thoracic Screen   Cervical range of motion within normal limits    Active Range of Motion   Left Shoulder   Flexion: 160 degrees   Abduction: 134 degrees   External rotation BTH: WFL  Internal rotation BTB: WFL    Right Shoulder   Flexion: 15 degrees   Abduction: 15 degrees   Internal rotation BTB: WFL    Additional Active Range of Motion Details  Can put hand on neck with elbow down for ER      Passive Range of Motion     Right Shoulder   Flexion: 140 degrees   Abduction: 90 degrees   External rotation 45°: 43 degrees   Internal rotation 45°: 66 degrees     Strength/Myotome Testing     Left Shoulder     Planes of Motion   Flexion: 0   Abduction: 0     Isolated Muscles   Supraspinatus: 0     Additional Strength Details  Patient with pain free weakness t/o the shoulder.  Unable to resist in shoulder flex/abd/scaption or ER             Precautions: na  Access Code: [email or web address]  URL: [email or web address]  Date: 03/12/2024  Prepared by: Tiffanie Del Angel    Exercises  - Seated Shoulder Flexion Towel Slide at Table Top  - 2 x daily - 7 x weekly - 3 sets - 10 reps  - Seated Shoulder Scaption Slide at Table Top with Forearm in Neutral  - 2 x daily - 7 x weekly - 1 sets - 5 reps - 10 hold  - Seated Shoulder External Rotation PROM on Table  - 2 x daily - 7 x weekly - 1 sets - 5 reps - 10 hold  - Seated Scapular Retraction  - 2 x daily - 7 x weekly - 1 sets - 10 reps - 5 hold    Manuals 3/12            Shoulder blade sq 10                                                   Neuro Re-Ed                          Table slides 10 x :10            Edvin             Wall climb             isometrics                                       Ther Ex                                                                                                                     Ther Activity                                       Gait Training                                       Modalities

## 2024-03-14 ENCOUNTER — OFFICE VISIT (OUTPATIENT)
Dept: PHYSICAL THERAPY | Facility: CLINIC | Age: 69
End: 2024-03-14
Payer: COMMERCIAL

## 2024-03-14 DIAGNOSIS — M25.511 ACUTE PAIN OF RIGHT SHOULDER: Primary | ICD-10-CM

## 2024-03-14 PROCEDURE — 97110 THERAPEUTIC EXERCISES: CPT

## 2024-03-14 NOTE — PROGRESS NOTES
"Daily Note     Today's date: 3/14/2024  Patient name: Solomon Meyers  : 1955  MRN: [id number]  Referring provider: Enmanuel Tyson PA*  Dx:   Encounter Diagnosis     ICD-10-CM    1. Acute pain of right shoulder  M25.511                      Subjective: patient states that he is compliant with HEP      Objective: See treatment diary below      Assessment: Tolerated treatment well. Patient exhibited good technique with therapeutic exercises      Plan: Continue per plan of care.      Precautions: na  Access Code: [email or web address]  URL: [email or web address]  Date: 2024  Prepared by: Tiffanie Del Angel    Exercises  - Seated Shoulder Flexion Towel Slide at Table Top  - 2 x daily - 7 x weekly - 3 sets - 10 reps  - Seated Shoulder Scaption Slide at Table Top with Forearm in Neutral  - 2 x daily - 7 x weekly - 1 sets - 5 reps - 10 hold  - Seated Shoulder External Rotation PROM on Table  - 2 x daily - 7 x weekly - 1 sets - 5 reps - 10 hold  - Seated Scapular Retraction  - 2 x daily - 7 x weekly - 1 sets - 10 reps - 5 hold    Manuals 3/12 3/14                                                               Neuro Re-Ed                          Table slides 10 x :10 10\" x 10            Pulley  5 min            Wall climb  10\" x 10            isometrics  5\"x 10            Shoulder blade sq  5\"x 10            Supine shoulder flexion aaaom   Elbow bent x 10            Supine shoulder aarom flexion - arm straight  X 10                                      Ther Ex                                                                                                                     Ther Activity                                       Gait Training                                       Modalities                                            "

## 2024-03-18 ENCOUNTER — OFFICE VISIT (OUTPATIENT)
Dept: PHYSICAL THERAPY | Facility: CLINIC | Age: 69
End: 2024-03-18
Payer: COMMERCIAL

## 2024-03-18 DIAGNOSIS — M25.511 ACUTE PAIN OF RIGHT SHOULDER: Primary | ICD-10-CM

## 2024-03-18 PROCEDURE — 97110 THERAPEUTIC EXERCISES: CPT

## 2024-03-18 NOTE — PROGRESS NOTES
"Daily Note     Today's date: 3/18/2024  Patient name: Solomon Meyers  : 1955  MRN: [id number]  Referring provider: Enmanuel Tyson PA*  Dx:   Encounter Diagnosis     ICD-10-CM    1. Acute pain of right shoulder  M25.511           Start Time: 1530  Stop Time: 1610  Total time in clinic (min): 40 minutes    Subjective: Patient sore following last treatment session      Objective: See treatment diary below      Assessment: Tolerated treatment well. Patient exhibited good technique with therapeutic exercises      Plan: Continue per plan of care.      Precautions: na  Access Code: [email or web address]  URL: [email or web address]  Date: 2024  Prepared by: Tiffanie Del Angel    Exercises  - Seated Shoulder Flexion Towel Slide at Table Top  - 2 x daily - 7 x weekly - 3 sets - 10 reps  - Seated Shoulder Scaption Slide at Table Top with Forearm in Neutral  - 2 x daily - 7 x weekly - 1 sets - 5 reps - 10 hold  - Seated Shoulder External Rotation PROM on Table  - 2 x daily - 7 x weekly - 1 sets - 5 reps - 10 hold  - Seated Scapular Retraction  - 2 x daily - 7 x weekly - 1 sets - 10 reps - 5 hold    Manuals 3/12 3/14 3/18           Supine/elbow flexed RS   15\" x 4                                                  Neuro Re-Ed                          Table slides 10 x :10 10\" x 10            Pulley  5 min  5 min           Wall climb  10\" x 10  10\" x 10           isometrics  5\"x 10  5\" x 10           Shoulder blade sq  5\"x 10            Supine wand press up  Elbow bent x 10  1.5# 2 X 10           Supine shoulder aarom flexion - arm straight  X 10  2 x 10           Supine wand press ups and overhead    10                        Ther Ex                                                                                                                     Ther Activity                                       Gait Training                                       Modalities                                              "

## 2024-03-21 ENCOUNTER — OFFICE VISIT (OUTPATIENT)
Dept: PHYSICAL THERAPY | Facility: CLINIC | Age: 69
End: 2024-03-21
Payer: COMMERCIAL

## 2024-03-21 DIAGNOSIS — M75.01 ADHESIVE CAPSULITIS OF RIGHT SHOULDER: ICD-10-CM

## 2024-03-21 DIAGNOSIS — Z87.39 HISTORY OF CLOSED DISLOCATION OF SHOULDER: ICD-10-CM

## 2024-03-21 DIAGNOSIS — M25.511 ACUTE PAIN OF RIGHT SHOULDER: Primary | ICD-10-CM

## 2024-03-21 PROCEDURE — 97110 THERAPEUTIC EXERCISES: CPT

## 2024-03-21 NOTE — PROGRESS NOTES
"Daily Note     Today's date: 3/21/2024  Patient name: Solomon Meyers  : 1955  MRN: [id number]  Referring provider: Enmanuel Tyson PA*  Dx:   Encounter Diagnosis     ICD-10-CM    1. Acute pain of right shoulder  M25.511       2. History of closed dislocation of shoulder  Z87.39       3. Adhesive capsulitis of right shoulder  M75.01                      Subjective: Patient sore following last  treatment session.       Objective: See treatment diary below      Assessment: Tolerated treatment and the addition of prone series.       Plan: Continue per plan of care.      Precautions: na  Access Code: [email or web address]  URL: [email or web address]  Date: 2024  Prepared by: Tiffanie Del Angel    Exercises  - Seated Shoulder Flexion Towel Slide at Table Top  - 2 x daily - 7 x weekly - 3 sets - 10 reps  - Seated Shoulder Scaption Slide at Table Top with Forearm in Neutral  - 2 x daily - 7 x weekly - 1 sets - 5 reps - 10 hold  - Seated Shoulder External Rotation PROM on Table  - 2 x daily - 7 x weekly - 1 sets - 5 reps - 10 hold  - Seated Scapular Retraction  - 2 x daily - 7 x weekly - 1 sets - 10 reps - 5 hold    Manuals 3/12 3/14 3/18  3/21         Supine/elbow flexed RS   15\" x 4                                                  Neuro Re-Ed                          Table slides 10 x :10 10\" x 10            Pulley  5 min  5 min  5 min          Wall climb  10\" x 10  10\" x 10  10' x 10          isometrics  5\"x 10  5\" x 10           Shoulder blade sq  5\"x 10            Supine wand press up  Elbow bent x 10  1.5# 2 X 10  1.5# 2 x 10          Supine shoulder aarom flexion - arm straight  X 10  2 x 10  2 x 10          Supine wand press ups and overhead    10  10          Prone shrugs    2 x 10          Prone extension    2 x 10          Prone rows    2 x 10         Ther Ex                                                                                                                     Ther Activity                "                        Gait Training                                       Modalities

## 2024-03-25 ENCOUNTER — OFFICE VISIT (OUTPATIENT)
Dept: PHYSICAL THERAPY | Facility: CLINIC | Age: 69
End: 2024-03-25
Payer: COMMERCIAL

## 2024-03-25 DIAGNOSIS — M25.511 ACUTE PAIN OF RIGHT SHOULDER: Primary | ICD-10-CM

## 2024-03-25 DIAGNOSIS — Z87.39 HISTORY OF CLOSED DISLOCATION OF SHOULDER: ICD-10-CM

## 2024-03-25 PROCEDURE — 97110 THERAPEUTIC EXERCISES: CPT

## 2024-03-25 NOTE — PROGRESS NOTES
"Daily Note     Today's date: 3/25/2024  Patient name: Solomon Meyers  : 1955  MRN: [id number]  Referring provider: Enmanuel Tyson PA*  Dx:   Encounter Diagnosis     ICD-10-CM    1. Acute pain of right shoulder  M25.511       2. History of closed dislocation of shoulder  Z87.39                      Subjective: Maximally compliant with HEP       Objective: See treatment diary below      Assessment: Tolerated treatment well. Patient would benefit from continued PT      Plan: Continue per plan of care.      Precautions: na  Access Code: [email or web address]  URL: [email or web address]  Date: 2024  Prepared by: Tiffanie Del Angel    Exercises  - Seated Shoulder Flexion Towel Slide at Table Top  - 2 x daily - 7 x weekly - 3 sets - 10 reps  - Seated Shoulder Scaption Slide at Table Top with Forearm in Neutral  - 2 x daily - 7 x weekly - 1 sets - 5 reps - 10 hold  - Seated Shoulder External Rotation PROM on Table  - 2 x daily - 7 x weekly - 1 sets - 5 reps - 10 hold  - Seated Scapular Retraction  - 2 x daily - 7 x weekly - 1 sets - 10 reps - 5 hold    Manuals 3/12 3/14 3/18  3/21 3/25        Supine/elbow flexed RS   15\" x 4                                                  Neuro Re-Ed                          Table slides 10 x :10 10\" x 10            Pulley  5 min  5 min  5 min  5        Wall climb  10\" x 10  10\" x 10  10' x 10  10\" x 10         isometrics  5\"x 10  5\" x 10           Shoulder blade sq  5\"x 10            Supine wand press up  Elbow bent x 10  1.5# 2 X 10  1.5# 2 x 10  1.5# 2 x 10         Supine shoulder aarom flexion - arm straight  X 10  2 x 10  2 x 10  1.5#2 x 10         Supine wand press ups and overhead    10  10  1.5# 2 x 10         Prone shrugs    2 x 10  2 x 10         Prone extension    2 x 10  2 x 10         Prone rows    2 x 10 2 x 10         Supine ER/IR controlled slow AROM      2 x 10                      Ther Ex                                                                        "                                              Ther Activity                                       Gait Training                                       Modalities

## 2024-03-28 ENCOUNTER — OFFICE VISIT (OUTPATIENT)
Dept: PHYSICAL THERAPY | Facility: CLINIC | Age: 69
End: 2024-03-28
Payer: COMMERCIAL

## 2024-03-28 DIAGNOSIS — M25.511 ACUTE PAIN OF RIGHT SHOULDER: Primary | ICD-10-CM

## 2024-03-28 PROCEDURE — 97014 ELECTRIC STIMULATION THERAPY: CPT

## 2024-03-28 PROCEDURE — 97110 THERAPEUTIC EXERCISES: CPT

## 2024-03-28 PROCEDURE — 97112 NEUROMUSCULAR REEDUCATION: CPT

## 2024-03-28 NOTE — PROGRESS NOTES
"Daily Note     Today's date: 3/28/2024  Patient name: Solomon Meyers  : 1955  MRN: [id number]  Referring provider: Enmanuel Tyson PA*  Dx:   Encounter Diagnosis     ICD-10-CM    1. Acute pain of right shoulder  M25.511                      Subjective: Patient sore to begin treatment       Objective: See treatment diary below      Assessment: Tolerated treatment well. Patient exhibited good technique with therapeutic exercises      Plan: Continue per plan of care.      Precautions: na  Access Code: [email or web address]  URL: [email or web address]  Date: 2024  Prepared by: Tiffanie Del Angel    Exercises  - Seated Shoulder Flexion Towel Slide at Table Top  - 2 x daily - 7 x weekly - 3 sets - 10 reps  - Seated Shoulder Scaption Slide at Table Top with Forearm in Neutral  - 2 x daily - 7 x weekly - 1 sets - 5 reps - 10 hold  - Seated Shoulder External Rotation PROM on Table  - 2 x daily - 7 x weekly - 1 sets - 5 reps - 10 hold  - Seated Scapular Retraction  - 2 x daily - 7 x weekly - 1 sets - 10 reps - 5 hold    Precautions:n/a  Manuals 3/12 3/14 3/18  3/21 3/25 3/28       Supine/elbow flexed RS   15\" x 4                        NMES      10                     Neuro Re-Ed                          Table slides 10 x :10 10\" x 10            Pulley  5 min  5 min  5 min  5 5       Wall climb flex and abd  10\" x 10  10\" x 10  10' x 10  10\" x 10  10\" x 10       isometrics  5\"x 10  5\" x 10           Shoulder blade sq  5\"x 10            Supine wand press up  Elbow bent x 10  1.5# 2 X 10  1.5# 2 x 10  1.5# 2 x 10  1.5# 2 x 10        Supine shoulder aarom flexion - arm straight  X 10  2 x 10  2 x 10  1.5#2 x 10  1.5# 2 x 10        Supine wand press ups and overhead    10  10  1.5# 2 x 10  1.5# 2 x 10        Prone shrugs    2 x 10  2 x 10  2 x 10        Prone extension    2 x 10  2 x 10  2 x 10        Prone rows    2 x 10 2 x 10  2 x 10       Prone horizontal abduction             Supine ER/IR controlled slow AROM    "   2 x 10                      Ther Ex                                                                                                                     Ther Activity                                       Gait Training                                       Modalities

## 2024-04-01 ENCOUNTER — OFFICE VISIT (OUTPATIENT)
Dept: PHYSICAL THERAPY | Facility: CLINIC | Age: 69
End: 2024-04-01
Payer: COMMERCIAL

## 2024-04-01 DIAGNOSIS — M25.511 ACUTE PAIN OF RIGHT SHOULDER: Primary | ICD-10-CM

## 2024-04-01 PROCEDURE — 97110 THERAPEUTIC EXERCISES: CPT

## 2024-04-01 NOTE — PROGRESS NOTES
"Daily Note     Today's date: 2024  Patient name: Solomon Meyers  : 1955  MRN: [id number]  Referring provider: Enmanuel Tyson PA*  Dx:   Encounter Diagnosis     ICD-10-CM    1. Acute pain of right shoulder  M25.511                      Subjective: patient states that he is feeling well.       Objective: See treatment diary below      Assessment: Tolerated treatment well. Patient exhibited good technique with therapeutic exercises      Plan: Continue per plan of care.      Precautions: na  Access Code: [email or web address]  URL: [email or web address]  Date: 2024  Prepared by: Tiffanie Del Angel    Exercises  - Seated Shoulder Flexion Towel Slide at Table Top  - 2 x daily - 7 x weekly - 3 sets - 10 reps  - Seated Shoulder Scaption Slide at Table Top with Forearm in Neutral  - 2 x daily - 7 x weekly - 1 sets - 5 reps - 10 hold  - Seated Shoulder External Rotation PROM on Table  - 2 x daily - 7 x weekly - 1 sets - 5 reps - 10 hold  - Seated Scapular Retraction  - 2 x daily - 7 x weekly - 1 sets - 10 reps - 5 hold    Precautions:n/a  Manuals 3/12 3/14 3/18  3/21 3/25 3/28 4/1      Supine/elbow flexed RS   15\" x 4                        NMES      10                     Neuro Re-Ed                          Table slides 10 x :10 10\" x 10            P[place]ley  5 min  5 min  5 min  5 5 5      Wall climb flex and abd  10\" x 10  10\" x 10  10' x 10  10\" x 10  10\" x 10 10\" x 10      isometrics  5\"x 10  5\" x 10           Shoulder blade sq  5\"x 10            Supine wand press up  Elbow bent x 10  1.5# 2 X 10  1.5# 2 x 10  1.5# 2 x 10  1.5# 2 x 10  1.5# 2 x 10       Supine shoulder aarom flexion - arm straight  X 10  2 x 10  2 x 10  1.5#2 x 10  1.5# 2 x 10  1.5# 2 x 10       Supine wand press ups and overhead    10  10  1.5# 2 x 10  1.5# 2 x 10  1.5# 2 x 10       Prone shrugs    2 x 10  2 x 10  2 x 10        Prone extension    2 x 10  2 x 10  2 x 10  2 x 10       Prone rows    2 x 10 2 x 10  2 x 10 2 x 10       Samia " extension        R arm only 4# 2 x 10       [place] rows        R arm only 11# 2 x 10       Supine ER/IR controlled slow AROM      2 x 10   2 x 10                    Ther Ex                                                                                                                     Ther Activity                                       Gait Training                                       Modalities

## 2024-04-03 ENCOUNTER — TELEPHONE (OUTPATIENT)
Age: 69
End: 2024-04-03

## 2024-04-03 ENCOUNTER — EVALUATION (OUTPATIENT)
Dept: PHYSICAL THERAPY | Facility: CLINIC | Age: 69
End: 2024-04-03
Payer: COMMERCIAL

## 2024-04-03 DIAGNOSIS — M25.511 ACUTE PAIN OF RIGHT SHOULDER: Primary | ICD-10-CM

## 2024-04-03 DIAGNOSIS — Z87.39 HISTORY OF CLOSED DISLOCATION OF SHOULDER: ICD-10-CM

## 2024-04-03 DIAGNOSIS — M75.01 ADHESIVE CAPSULITIS OF RIGHT SHOULDER: ICD-10-CM

## 2024-04-03 PROCEDURE — 97140 MANUAL THERAPY 1/> REGIONS: CPT | Performed by: PHYSICAL THERAPIST

## 2024-04-03 PROCEDURE — 97112 NEUROMUSCULAR REEDUCATION: CPT | Performed by: PHYSICAL THERAPIST

## 2024-04-03 PROCEDURE — 97110 THERAPEUTIC EXERCISES: CPT | Performed by: PHYSICAL THERAPIST

## 2024-04-03 NOTE — PROGRESS NOTES
PT Evaluation     Today's date: 4/3/2024  Patient name: Solomon Meyers  : 1955  MRN: [id number]  Referring provider: Enmanuel Tyson PA*  Dx:   Encounter Diagnosis     ICD-10-CM    1. Acute pain of right shoulder  M25.511 Ambulatory Referral to Physical Therapy      2. History of closed dislocation of shoulder  Z87.39 Ambulatory Referral to Physical Therapy      3. Adhesive capsulitis of right shoulder  M75.01 Ambulatory Referral to Physical Therapy                     Assessment:    :  Solomon has been attending physical therapy for R shoulder pain due to acute dislocation.  He has made some progress in improving his ROM and strength.  Functionally he continues to be severely limited in any activity over shoulder height.  He would continue to benefit from skilled physical therapy to achieve maximal functional benefits as well as strength and ROM.  Thank you for your referral.      Assessment details: Solomon Meyers is a 68 y.o. R shoulder pain secondary to an acute fall from a horse.  He presents with pain, painfree weakness,  decreased ROM, and postural dysfunction likely related to RTCT/ nerve injury.  Due to these impairments, patient has difficulty performing a/iadls, recreational activities and engaging in social activities. Patient's clinical presentation is consistent with their referring diagnosis. Patient would benefit from skilled physical therapy to address their aforementioned impairments, improve their level of function and to improve their overall quality of life.  has been given a home exercise program and is in agreement with the plan of care.  Thank you for your referral.     Impairments: impaired physical strength and lacks appropriate home exercise program  Understanding of Dx/Px/POC: excellent  Goals  ST Goals - 2-4 weeks  1. Patient will report decreased pain with activity by at least 2 points within 4 weeks  2. Patient will improve ROM 5-10 degrees within 4 weeks  3. Patient  will have increased strength to reach to shoulder height in 4 weeks  4.  Patient will perform IADLs without pain in 4 weeks  5.  Patient will increase strength by 25% in 4 weeks    LT Goals - Discharge  1. Patient will improve FOTO score to maximum stated or greater by discharge  2. Patient will return to preferred recreational activity without significant pain increase by discharge   3.  Patient will return to all house work related activities without pain by discharge      Plan  Patient would benefit from: skilled physical therapy  Referral necessary: No  Planned therapy interventions: manual therapy, joint mobilization, neuromuscular re-education, strengthening, stretching, therapeutic activities, therapeutic exercise and home exercise program  Frequency: 2x week  Duration in visits: 20  Duration in weeks: 20  Plan of Care beginning date: 3/12/2024  Plan of Care expiration date: 5/24/2024  Treatment plan discussed with: patient        Subjective Evaluation    History of Present Illness  Mechanism of injury: Patient reports that he dislocated his R shoulder while riding a horse and his shoulder was reduced and he was placed in a sling x 2 weeks.  He came out of the sling and tried to moving his arm with AAROM with assistance of the L UE  He had an x-ray at the time of the injury and recently with no sign of fx of the shoulder.   CC:  He notes that he has little pain but significant lack of ROM in the shoulder.  Function:  He is retired from [employer].  He is RHD.  He exercises at the gym doing mostly cardio.  He is able to do the elliptical.  He has difficulty with IADLs.  He has some difficulty with dressing.  He notes shoulder is painful if he is laying on his shoulder.   He denies radicular sx or neck pain.   He needs to be able to return to yard work.           Recurrent probem    Quality of life: excellent    Patient Goals  Patient goals for therapy: return to sport/leisure activities, independence with  ADLs/IADLs, increased strength, decreased pain and increased motion    Pain                                              4/3/24  Current pain ratin  At best pain ratin  At worst pain ratin   /                    4/10         Aggravating factors: lifting and overhead activity      Social Support    Employment status: not working  Hand dominance: right      Diagnostic Tests  X-ray: normal        Objective     Palpation     Additional Palpation Details  Denies palpable tenderness t/o the shoulder or neck    Cervical/Thoracic Screen   Cervical range of motion within normal limits    Active Range of Motion   Left Shoulder   Flexion: 160 degrees   Abduction: 134 degrees   External rotation BTH: WFL  Internal rotation BTB: WFL    Right Shoulder                          4/3/24    Flexion: 15 degrees                     25  Abduction: 15 degrees                 35  Internal rotation BTB: WFL            WNL      Additional Active Range of Motion Details  Can put hand on neck with elbow down for ER      Passive Range of Motion                   Right Shoulder                                          4/3/24    Flexion: 140 degrees                                  130  Abduction: 90 degrees                                110  External rotation 45°: 43 degrees                 50  Internal rotation 45°: 66 degrees                  75    Strength/Myotome Testing     Right Shoulder                                            4/3/24    Planes of Motion   Flexion: 0                                 Abduction: 0     Isolated Muscles   Supraspinatus/ ER: 0                no change:   0/5  IR:                                                                  4+/5    Additional Strength Details  Patient with pain free weakness t/o the shoulder.  Unable to resist in shoulder flex/abd/scaption or ER             Precautions: na  Access Code: [email or web address]  URL: [email or web address]  Date: 2024  Prepared by: Tiffanie  Wahlgren    Exercises  - Seated Shoulder Flexion Towel Slide at Table Top  - 2 x daily - 7 x weekly - 3 sets - 10 reps  - Seated Shoulder Scaption Slide at Table Top with Forearm in Neutral  - 2 x daily - 7 x weekly - 1 sets - 5 reps - 10 hold  - Seated Shoulder External Rotation PROM on Table  - 2 x daily - 7 x weekly - 1 sets - 5 reps - 10 hold  - Seated Scapular Retraction  - 2 x daily - 7 x weekly - 1 sets - 10 reps - 5 hold    Manuals 3/12            Shoulder blade sq 10                                                   Neuro Re-Ed                          Table slides 10 x :10            [place]             Wall climb             isometrics                                       Ther Ex                                                                                                                     Ther Activity                                       Gait Training                                       Modalities

## 2024-04-03 NOTE — PROGRESS NOTES
"Daily Note     Today's date: 4/3/2024  Patient name: Solomon Meyesr  : 1955  MRN: [id number]  Referring provider: Enmanuel Tyson PA*  Dx: No diagnosis found.               Subjective: Patient reports that he feels his UE is improving.  He states that he has more strength and ROM      Objective: See treatment diary below      Assessment: Tolerated treatment well. Patient would benefit from continued PT      Plan: Continue per plan of care.      Precautions: na  Access Code: [email or web address]  URL: [email or web address]  Date: 2024  Prepared by: Tiffanie Del Angel    Exercises  - Seated Shoulder Flexion Towel Slide at Table Top  - 2 x daily - 7 x weekly - 3 sets - 10 reps  - Seated Shoulder Scaption Slide at Table Top with Forearm in Neutral  - 2 x daily - 7 x weekly - 1 sets - 5 reps - 10 hold  - Seated Shoulder External Rotation PROM on Table  - 2 x daily - 7 x weekly - 1 sets - 5 reps - 10 hold  - Seated Scapular Retraction  - 2 x daily - 7 x weekly - 1 sets - 10 reps - 5 hold    Precautions:n/a  Manuals 3/12 3/14 3/18  3/21 3/25 3/28 4/1 4/3     Supine/elbow flexed RS   15\" x 4           REV        10'     NMES      10                     Neuro Re-Ed                          Table slides 10 x :10 10\" x 10            [place]  5 min  5 min  5 min  5 5 5 5'     Wall climb flex and abd  10\" x 10  10\" x 10  10' x 10  10\" x 10  10\" x 10 10\" x 10 10 x :10     isometrics  5\"x 10  5\" x 10           Shoulder blade sq  5\"x 10            Supine wand press up  Elbow bent x 10  1.5# 2 X 10  1.5# 2 x 10  1.5# 2 x 10  1.5# 2 x 10  1.5# 2 x 10  1.5# 2 x 10     Supine shoulder aarom flexion - arm straight  X 10  2 x 10  2 x 10  1.5#2 x 10  1.5# 2 x 10  1.5# 2 x 10  1.5# 2 x 10     Supine wand press ups and overhead    10  10  1.5# 2 x 10  1.5# 2 x 10  1.5# 2 x 10  1.5# 2 x 10     Prone shrugs    2 x 10  2 x 10  2 x 10   2x10     Prone extension    2 x 10  2 x 10  2 x 10  2 x 10  2 x 10     Prone rows    2 x 10 2 x " 10  2 x 10 2 x 10  2 x 10     Samia extension        R arm only 4# 2 x 10  4# R only     Samia rows        R arm only 11# 2 x 10  11# 3 x 10     Supine ER/IR controlled slow AROM      2 x 10   2 x 10  2 x 10                  Ther Ex                                                                                                                     Ther Activity                                       Gait Training                                       Modalities

## 2024-04-10 ENCOUNTER — APPOINTMENT (OUTPATIENT)
Dept: PHYSICAL THERAPY | Facility: CLINIC | Age: 69
End: 2024-04-10
Payer: COMMERCIAL

## 2024-04-11 ENCOUNTER — OFFICE VISIT (OUTPATIENT)
Dept: OBGYN CLINIC | Facility: CLINIC | Age: 69
End: 2024-04-11
Payer: COMMERCIAL

## 2024-04-11 VITALS
DIASTOLIC BLOOD PRESSURE: 90 MMHG | HEIGHT: 68 IN | WEIGHT: 168 LBS | SYSTOLIC BLOOD PRESSURE: 137 MMHG | BODY MASS INDEX: 25.46 KG/M2 | HEART RATE: 71 BPM

## 2024-04-11 DIAGNOSIS — M25.511 ACUTE PAIN OF RIGHT SHOULDER: ICD-10-CM

## 2024-04-11 DIAGNOSIS — M75.01 ADHESIVE CAPSULITIS OF RIGHT SHOULDER: ICD-10-CM

## 2024-04-11 DIAGNOSIS — S46.001D ROTATOR CUFF INJURY, RIGHT, SUBSEQUENT ENCOUNTER: ICD-10-CM

## 2024-04-11 DIAGNOSIS — R29.898 WEAKNESS OF RIGHT SHOULDER: ICD-10-CM

## 2024-04-11 DIAGNOSIS — Z87.39 HISTORY OF CLOSED DISLOCATION OF SHOULDER: Primary | ICD-10-CM

## 2024-04-11 PROCEDURE — 99213 OFFICE O/P EST LOW 20 MIN: CPT | Performed by: PHYSICIAN ASSISTANT

## 2024-04-11 NOTE — PROGRESS NOTES
Orthopaedic Surgery - Office Note  Solomon Meyers (69 y.o. male)   : 1955   MRN: [id number]  Encounter Date: 2024    Chief Complaint   Patient presents with    Right Shoulder - Pain         Assessment/Plan  Diagnoses and all orders for this visit:    History of closed dislocation of shoulder-2024    Adhesive capsulitis of right shoulder    Acute pain of right shoulder    Weakness of right shoulder    Rotator cuff injury, right, subsequent encounter    The diagnosis as well as treatment options were reviewed with the patient in the office today.  He will ice the shoulder for comfort 20 minutes on 1 hour off 3 times a day.  He will continue to do daily home exercise program.  He reports he is exhausted 8 of 8 physical therapy visits that were approved.  At this point I would recommend seeing an orthopedic shoulder surgeon to discuss the risks and benefits of manipulation under general anesthesia as he has failed to regain motion despite aggressive PT and home exercise program.    I reviewed with the patient I would not recommend an MRI at this time, as even if it did show full-thickness rotator cuff tear we would need to establish better range of motion prior to considering surgical repair.  All question concerns were answered in the office today-he will follow-up with shoulder surgeon to discuss next best step treatment options.       Return for Recheck with orthopedic surgeon to discuss MRI of right shoulder.        History of Present Illness  This is a previous patient who was seen by myself on 3/2/2024.  He had a shoulder dislocation and subsequent reduction surgery while in [place] on 2024.  He reports he was treated for significant period of time in a sling post injury and did not start any therapy.  He had progressive loss of motion and was diagnosed with adhesive capsulitis by myself.  He is right-hand dominant.  Patient has a contributing medical history of diabetes with his most recent  hemoglobin A1c being 6.0.  Patient has not had any significant decrease in symptoms with PT or gains in range of motion.  He reports he has exhausted 8 of 8 approved physical therapy visits.  No new trauma or injuries are reported.    Review of Systems  Pertinent items are noted in HPI.  All other systems were reviewed and are negative.    Physical Exam  There were no vitals taken for this visit.  Cons: Appears well.  No apparent distress.  Psych: Alert. Oriented x3.  Mood and affect normal.    Right shoulder is nontender to palpation.  Active forward flexion is to 80 degrees.  Passive forward flexion is to 90 degrees with hard stop endpoint.  Internal rotation is to the hip.  External rotation is to approximately 40 degrees.  Internal rotation and external rotation strength is at 4 out of 5.  He is neurovascular grossly intact in the right upper extremity without any neurological deficits appreciated.  He has no tenderness at the AC joint or proximal humerus.  There is no ecchymosis or soft tissue edema.          Studies Reviewed  RIGHT SHOULDER     INDICATION:   Pain in right shoulder.      COMPARISON:  None.     VIEWS:  XR SHOULDER 2+ VW RIGHT  Images: 3     FINDINGS:     There is no acute fracture or dislocation.     Mild osteoarthritis acromioclavicular joint.     No lytic or blastic osseous lesion.     Soft tissues are unremarkable.     IMPRESSION:        No acute osseous abnormality.     Degenerative changes as described.     Electronically signed: 02/27/2024 11:56 AM Stevan Harrell MPH,MD  X-ray images as well as reports were reviewed by myself in the office today independently and I agree with radiologist interpretation.  Physical therapy notes were reviewed by myself in the office today.      Procedures  No procedures today.    Medical, Surgical, Family, and Social History  The patient's medical history, family history, and social history, were reviewed and updated as appropriate.    Past Medical History:    Diagnosis Date    Benign neoplasm of colon     Colon polyp     GERD (gastroesophageal reflux disease)     mild diet controlled    Hyperlipidemia     Pituitary mass (HCC)        Past Surgical History:   Procedure Laterality Date    INGUINAL HERNIA REPAIR Bilateral     MOHS SURGERY  2000    nose    UT COLONOSCOPY FLX DX W/COLLJ SPEC WHEN PFRMD N/A 12/18/2018    Procedure: COLONOSCOPY;  Surgeon: Kevin Flores MD;  Location: [facility];  Service: Gastroenterology       Family History   Problem Relation Age of Onset    Varicose Veins Mother     Hypertension Mother     Parkinsonism Father     Hypertension Father     No Known Problems Sister     No Known Problems Brother     No Known Problems Sister     No Known Problems Sister     No Known Problems Sister     No Known Problems Sister     No Known Problems Brother        Social History     Occupational History    Not on file   Tobacco Use    Smoking status: Never    Smokeless tobacco: Never   Vaping Use    Vaping status: Never Used   Substance and Sexual Activity    Alcohol use: Yes     Alcohol/week: 14.0 standard drinks of alcohol     Types: 14 Cans of beer per week     Comment: weekends, sometimes    Drug use: No    Sexual activity: Yes       No Known Allergies      Current Outpatient Medications:     levothyroxine (Euthyrox) 25 mcg tablet, Take 1 tablet (25 mcg total) by mouth daily, Disp: 90 tablet, Rfl: 31    rosuvastatin (CRESTOR) 5 mg tablet, Take 1 tablet (5 mg total) by mouth daily, Disp: 90 tablet, Rfl: 1      Enmanuel Tyson PA-C

## 2024-04-15 ENCOUNTER — OFFICE VISIT (OUTPATIENT)
Dept: OBGYN CLINIC | Facility: CLINIC | Age: 69
End: 2024-04-15
Payer: COMMERCIAL

## 2024-04-15 ENCOUNTER — APPOINTMENT (OUTPATIENT)
Dept: PHYSICAL THERAPY | Facility: CLINIC | Age: 69
End: 2024-04-15
Payer: COMMERCIAL

## 2024-04-15 VITALS
HEIGHT: 68 IN | SYSTOLIC BLOOD PRESSURE: 139 MMHG | BODY MASS INDEX: 25.98 KG/M2 | HEART RATE: 79 BPM | WEIGHT: 171.4 LBS | DIASTOLIC BLOOD PRESSURE: 89 MMHG

## 2024-04-15 DIAGNOSIS — S46.001D ROTATOR CUFF INJURY, RIGHT, SUBSEQUENT ENCOUNTER: ICD-10-CM

## 2024-04-15 DIAGNOSIS — R29.898 WEAKNESS OF RIGHT SHOULDER: ICD-10-CM

## 2024-04-15 DIAGNOSIS — Z87.39 HISTORY OF CLOSED DISLOCATION OF SHOULDER: ICD-10-CM

## 2024-04-15 DIAGNOSIS — M75.01 ADHESIVE CAPSULITIS OF RIGHT SHOULDER: ICD-10-CM

## 2024-04-15 DIAGNOSIS — M25.511 ACUTE PAIN OF RIGHT SHOULDER: Primary | ICD-10-CM

## 2024-04-15 PROCEDURE — 99213 OFFICE O/P EST LOW 20 MIN: CPT | Performed by: ORTHOPAEDIC SURGERY

## 2024-04-15 NOTE — PROGRESS NOTES
CHIEF COMPLAIN/REASON FOR VISIT  Chief Complaint   Patient presents with    Right Shoulder - Pain       HISTORY OF PRESENT ILLNESS  Solomon Meyers is a RHD 69 y.o. male who presents for evaluation of their right shoulder.   The patient has been referred by Enmanuel Smallwood.  He has history of right shoulder dislocation 2/6/2024 with relocation performed in [place].  Today he complains of continued right shoulder weakness and dysfuction with some pain.  He has participated in physical therapy with limited progress.        REVIEW OF SYSTEMS  Review of systems was performed and, woutside that mentioned in the HPI, it was negative for symptomology related to the integumentary, hematologic, immunologic, allergic, neurologic, cardiovascular, respiratory, GI or  systems.     MEDICAL HISTORY  Patient Active Problem List   Diagnosis    Hyperlipidemia    Prediabetes    Overweight with body mass index (BMI) of 27 to 27.9 in adult    GERD (gastroesophageal reflux disease)    History of colon polyps    Asymptomatic varicose veins of both lower extremities    Chronic left shoulder pain    Plantar fasciitis of right foot    Increased prostate specific antigen (PSA) velocity    Need for influenza vaccination    Gastroesophageal reflux disease without esophagitis    Congestion of both ears    Wellness examination    Venous insufficiency    Type 2 diabetes mellitus without complication, without long-term current use of insulin (HCC)    Symptomatic varicose veins of left lower extremity    Secondary hypothyroidism    Acute pain of right shoulder       SURGICAL HISTORY  Past Surgical History:   Procedure Laterality Date    INGUINAL HERNIA REPAIR Bilateral     MOHS SURGERY  2000    nose    DC COLONOSCOPY FLX DX W/COLLJ SPEC WHEN PFRMD N/A 12/18/2018    Procedure: COLONOSCOPY;  Surgeon: Kevin Flores MD;  Location: [facility];  Service: Gastroenterology       CURRENT MEDICATIONS    Current Outpatient Medications:     levothyroxine  "(Euthyrox) 25 mcg tablet, Take 1 tablet (25 mcg total) by mouth daily, Disp: 90 tablet, Rfl: 31    rosuvastatin (CRESTOR) 5 mg tablet, Take 1 tablet (5 mg total) by mouth daily, Disp: 90 tablet, Rfl: 1    SOCIAL HISTORY  Social History     Socioeconomic History    Marital status: /Civil Union     Spouse name: Not on file    Number of children: Not on file    Years of education: Not on file    Highest education level: Not on file   Occupational History    Not on file   Tobacco Use    Smoking status: Never    Smokeless tobacco: Never   Vaping Use    Vaping status: Never Used   Substance and Sexual Activity    Alcohol use: Yes     Alcohol/week: 14.0 standard drinks of alcohol     Types: 14 Cans of beer per week     Comment: weekends, sometimes    Drug use: No    Sexual activity: Yes   Other Topics Concern    Not on file   Social History Narrative    Dog      Social Determinants of Health     Financial Resource Strain: Low Risk  (9/8/2023)    Overall Financial Resource Strain (CARDIA)     Difficulty of Paying Living Expenses: Not hard at all   Food Insecurity: Not on file   Transportation Needs: No Transportation Needs (9/8/2023)    PRAPARE - Transportation     Lack of Transportation (Medical): No     Lack of Transportation (Non-Medical): No   Physical Activity: Not on file   Stress: Not on file   Social Connections: Not on file   Intimate Partner Violence: Not on file   Housing Stability: Not on file       Objective     VITAL SIGNS  /89 (BP Location: Left arm, Patient Position: Sitting, Cuff Size: Standard)   Pulse 79   Ht 5' 8\" (1.727 m)   Wt 77.7 kg (171 lb 6.4 oz)   BMI 26.06 kg/m²      PHYSICAL EXAM  General:   Well-appearing  No acute distress  Appears stated age    Cervical Spine  No tenderness to palpation over the cervical spine in the midline or of the paraspinal muscles  Full range of motion without pain  Negative spurlings   Negative Lhermitte test    Right Shoulder  Negative tenderness to " palpation over the acromioclavicular joint  Negative tenderness to palpation over the long head of the biceps tendon  Shoulder effusion none present  Shoulder abduction 30 degrees  Shoulder forward flexion Limited  Shoulder external rotation 50/50  Shoulder internal rotation T7/T7  Supraspinatus/ABD 2/5   Infraspinatus/ER 2/5   Negative Belly Press/SS  Negative Neer  Negative Bansal  Negative O'briens  Negative Apprehension  Negative Relocation  Negative Posterior Jerk  Negative Sulcus  Skin is intact with no erythema, warmth or drainage  Motor strength intact distally  Sensation to light touch is normal in the axillary, radial, ulnar, and median nerve distributions.  Fingers warm and perfused    RADIOGRAPHIC EXAMINATION/DIAGNOSTICS:  Procedure: XR shoulder 2+ vw right    Result Date: 2/27/2024  Narrative: RIGHT SHOULDER INDICATION:   Pain in right shoulder. COMPARISON:  None. VIEWS:  XR SHOULDER 2+ VW RIGHT Images: 3 FINDINGS: There is no acute fracture or dislocation. Mild osteoarthritis acromioclavicular joint. No lytic or blastic osseous lesion. Soft tissues are unremarkable.     Impression: No acute osseous abnormality. Degenerative changes as described. Electronically signed: 02/27/2024 11:56 AM Stevan Harrell MPH,MD     ASSESSMENT/PLAN:  Right shoulder dislocation with suspect subsequent rotator cuff tear    The patient had dislocation 2/6/2024 in [place].  He was relocated at that time along with immobilization.  She continues to have dysfunction and weakness with pain.  On exam he display significant weakness with abduction and rotator cuff strength testing.  Radiograph does not explain the dysfuction and weakness.  A right shoulder MRI was ordered to evaluate for internal derangement.  Follow up after MRI is performed.    History of dislocation 2/6/2024  Continued dysfunction and weakness   Right shoulder MRI ordered  Follow up for MRI review          Scribe Attestation      I,:  Luis Hancock am acting  as a scribe while in the presence of the attending physician.:       I,:  Gene Morrell MD personally performed the services described in this documentation    as scribed in my presence.:

## 2024-04-17 ENCOUNTER — APPOINTMENT (OUTPATIENT)
Dept: PHYSICAL THERAPY | Facility: CLINIC | Age: 69
End: 2024-04-17
Payer: COMMERCIAL

## 2024-04-18 ENCOUNTER — APPOINTMENT (OUTPATIENT)
Dept: LAB | Facility: AMBULARY SURGERY CENTER | Age: 69
End: 2024-04-18
Payer: COMMERCIAL

## 2024-04-18 DIAGNOSIS — E11.9 TYPE 2 DIABETES MELLITUS WITHOUT COMPLICATION, WITHOUT LONG-TERM CURRENT USE OF INSULIN (HCC): ICD-10-CM

## 2024-04-18 LAB
ALBUMIN SERPL BCP-MCNC: 4.5 G/DL (ref 3.5–5)
ALP SERPL-CCNC: 74 U/L (ref 34–104)
ALT SERPL W P-5'-P-CCNC: 32 U/L (ref 7–52)
ANION GAP SERPL CALCULATED.3IONS-SCNC: 10 MMOL/L (ref 4–13)
AST SERPL W P-5'-P-CCNC: 28 U/L (ref 13–39)
BILIRUB SERPL-MCNC: 0.76 MG/DL (ref 0.2–1)
BUN SERPL-MCNC: 15 MG/DL (ref 5–25)
CALCIUM SERPL-MCNC: 9.6 MG/DL (ref 8.4–10.2)
CHLORIDE SERPL-SCNC: 105 MMOL/L (ref 96–108)
CO2 SERPL-SCNC: 24 MMOL/L (ref 21–32)
CREAT SERPL-MCNC: 1.05 MG/DL (ref 0.6–1.3)
CREAT UR-MCNC: 120.6 MG/DL
EST. AVERAGE GLUCOSE BLD GHB EST-MCNC: 128 MG/DL
GFR SERPL CREATININE-BSD FRML MDRD: 72 ML/MIN/1.73SQ M
GLUCOSE P FAST SERPL-MCNC: 110 MG/DL (ref 65–99)
HBA1C MFR BLD: 6.1 %
MICROALBUMIN UR-MCNC: 8 MG/L
MICROALBUMIN/CREAT 24H UR: 7 MG/G CREATININE (ref 0–30)
POTASSIUM SERPL-SCNC: 4.1 MMOL/L (ref 3.5–5.3)
PROT SERPL-MCNC: 7.4 G/DL (ref 6.4–8.4)
SODIUM SERPL-SCNC: 139 MMOL/L (ref 135–147)

## 2024-04-18 PROCEDURE — 3044F HG A1C LEVEL LT 7.0%: CPT | Performed by: INTERNAL MEDICINE

## 2024-04-18 PROCEDURE — 83036 HEMOGLOBIN GLYCOSYLATED A1C: CPT

## 2024-04-18 PROCEDURE — 82043 UR ALBUMIN QUANTITATIVE: CPT

## 2024-04-18 PROCEDURE — 36415 COLL VENOUS BLD VENIPUNCTURE: CPT

## 2024-04-18 PROCEDURE — 82570 ASSAY OF URINE CREATININE: CPT

## 2024-04-18 PROCEDURE — 80053 COMPREHEN METABOLIC PANEL: CPT

## 2024-04-19 ENCOUNTER — OFFICE VISIT (OUTPATIENT)
Dept: INTERNAL MEDICINE CLINIC | Facility: CLINIC | Age: 69
End: 2024-04-19
Payer: COMMERCIAL

## 2024-04-19 VITALS
WEIGHT: 166.6 LBS | BODY MASS INDEX: 25.25 KG/M2 | RESPIRATION RATE: 16 BRPM | HEART RATE: 81 BPM | SYSTOLIC BLOOD PRESSURE: 122 MMHG | OXYGEN SATURATION: 99 % | HEIGHT: 68 IN | DIASTOLIC BLOOD PRESSURE: 82 MMHG

## 2024-04-19 DIAGNOSIS — Z00.00 HEALTHCARE MAINTENANCE: ICD-10-CM

## 2024-04-19 DIAGNOSIS — E03.8 SECONDARY HYPOTHYROIDISM: ICD-10-CM

## 2024-04-19 DIAGNOSIS — E11.9 TYPE 2 DIABETES MELLITUS WITHOUT COMPLICATION, WITHOUT LONG-TERM CURRENT USE OF INSULIN (HCC): Primary | ICD-10-CM

## 2024-04-19 DIAGNOSIS — E78.5 HYPERLIPIDEMIA, UNSPECIFIED HYPERLIPIDEMIA TYPE: ICD-10-CM

## 2024-04-19 DIAGNOSIS — M25.511 ACUTE PAIN OF RIGHT SHOULDER: ICD-10-CM

## 2024-04-19 DIAGNOSIS — E66.3 OVERWEIGHT WITH BODY MASS INDEX (BMI) OF 27 TO 27.9 IN ADULT: ICD-10-CM

## 2024-04-19 PROCEDURE — 99214 OFFICE O/P EST MOD 30 MIN: CPT | Performed by: INTERNAL MEDICINE

## 2024-04-19 PROCEDURE — G2211 COMPLEX E/M VISIT ADD ON: HCPCS | Performed by: INTERNAL MEDICINE

## 2024-04-19 NOTE — PROGRESS NOTES
Assessment/Plan:    Hyperlipidemia  Hyperlipidemia controlled continue with current medical regiment recommend a low-cholesterol diet and recommend routine exercise we will continue to monitor the progress.  Continue Crestor 5 mg once daily    Prediabetes  Pre diabetes -I have counseled the patient to follow a healthy balanced diet, I have counseled patient reduce carbohydrates and sweets in the diet, I would like the patient exercise routinely.  I will be checking hemoglobin A1c and comprehensive metabolic panel.  Have counseled patient about the prevention of diabetes, and the risk of progression to type 2 diabetes.    Overweight with body mass index (BMI) of 27 to 27.9 in adult  I have counselled the pt to follow a healthy and balanced diet ,and recommend routine exercise.    Type 2 diabetes mellitus without complication, without long-term current use of insulin (HCC)    Lab Results   Component Value Date    HGBA1C 6.1 (H) 04/18/2024   I have counselled the pt to follow a healthy and balanced diet ,and recommend routine exercise.  I will be ordering diabetic laboratories including comprehensive metabolic panel, hemoglobin A1c, urine microalbumin, lipid panel.  Annual eye examination recommended    Secondary hypothyroidism  Hypothyroidism controlled the patient is currently euthyroid I will be ordering a TSH prior to the next office visit and the patient will continue with current medical regiment; we will continue to monitor the patient's progress.    Acute pain of right shoulder  Working with orthopedics going for MRI in the near future undergoing physical therapy         Problem List Items Addressed This Visit        Endocrine    Type 2 diabetes mellitus without complication, without long-term current use of insulin (HCC) - Primary       Lab Results   Component Value Date    HGBA1C 6.1 (H) 04/18/2024   I have counselled the pt to follow a healthy and balanced diet ,and recommend routine exercise.  I will be  ordering diabetic laboratories including comprehensive metabolic panel, hemoglobin A1c, urine microalbumin, lipid panel.  Annual eye examination recommended         Relevant Orders    Comprehensive metabolic panel    Lipid Panel with Direct LDL reflex    Albumin / creatinine urine ratio    Hemoglobin A1c (w/out EAG) ([facility] ONLY)    Secondary hypothyroidism     Hypothyroidism controlled the patient is currently euthyroid I will be ordering a TSH prior to the next office visit and the patient will continue with current medical regiment; we will continue to monitor the patient's progress.            Surgery/Wound/Pain    Acute pain of right shoulder     Working with orthopedics going for MRI in the near future undergoing physical therapy            Other    Hyperlipidemia     Hyperlipidemia controlled continue with current medical regiment recommend a low-cholesterol diet and recommend routine exercise we will continue to monitor the progress.  Continue Crestor 5 mg once daily         Overweight with body mass index (BMI) of 27 to 27.9 in adult     I have counselled the pt to follow a healthy and balanced diet ,and recommend routine exercise.        Other Visit Diagnoses     Healthcare maintenance        Relevant Orders    Varicella zoster antibody, IgG         Return to office  6 months  call if any problems  Subjective:      Patient ID: Solomon Meyers is a 69 y.o. male.    HPI 69-year old male coming in for a follow up visit regarding type 2 diabetes, overweight, hyperlipidemia, hypothyroidism and shoulder pain right; the patient reports me compliant taking medications without untoward side effects the.  The patient is here to review his medical condition, update me on the medical condition and the patient reports me no hospitalizations and no ER visits.  No injuries no illnesses he has been working with orthopedics regarding right shoulder pain he will be going for MRI in the near future he has been attending  physical therapy but not making progress he has made major improvements in his diet he continues to follow healthy and balanced diet he is active here to review laboratories in detail he is uncertain if he has ever had chickenpox    The following portions of the patient's history were reviewed and updated as appropriate: allergies, current medications, past family history, past medical history, past social history, past surgical history and problem list.    Review of Systems   Constitutional:  Negative for activity change, appetite change and unexpected weight change.   HENT:  Negative for congestion and postnasal drip.    Eyes:  Negative for visual disturbance.   Respiratory:  Negative for cough and shortness of breath.    Cardiovascular:  Negative for chest pain.   Gastrointestinal:  Negative for abdominal pain, diarrhea, nausea and vomiting.   Musculoskeletal:         Shoulder pain   Neurological:  Negative for dizziness, light-headedness and headaches.   Hematological:  Negative for adenopathy.         Objective:    Return in about 6 months (around 10/19/2024).    No results found.      No Known Allergies    Past Medical History:   Diagnosis Date   • Benign neoplasm of colon    • Colon polyp    • GERD (gastroesophageal reflux disease)     mild diet controlled   • Hyperlipidemia    • Pituitary mass (HCC)      Past Surgical History:   Procedure Laterality Date   • INGUINAL HERNIA REPAIR Bilateral    • MOHS SURGERY  2000    nose   • NM COLONOSCOPY FLX DX W/COLLJ SPEC WHEN PFRMD N/A 12/18/2018    Procedure: COLONOSCOPY;  Surgeon: Kevin Flores MD;  Location[facility];  Service: Gastroenterology     Current Outpatient Medications on File Prior to Visit   Medication Sig Dispense Refill   • levothyroxine (Euthyrox) 25 mcg tablet Take 1 tablet (25 mcg total) by mouth daily 90 tablet 31   • rosuvastatin (CRESTOR) 5 mg tablet Take 1 tablet (5 mg total) by mouth daily 90 tablet 1     No current facility-administered  "medications on file prior to visit.     Family History   Problem Relation Age of Onset   • Varicose Veins Mother    • Hypertension Mother    • Parkinsonism Father    • Hypertension Father    • No Known Problems Sister    • No Known Problems Brother    • No Known Problems Sister    • No Known Problems Sister    • No Known Problems Sister    • No Known Problems Sister    • No Known Problems Brother      Social History     Socioeconomic History   • Marital status: /Civil Union     Spouse name: Not on file   • Number of children: Not on file   • Years of education: Not on file   • Highest education level: Not on file   Occupational History   • Not on file   Tobacco Use   • Smoking status: Never   • Smokeless tobacco: Never   Vaping Use   • Vaping status: Never Used   Substance and Sexual Activity   • Alcohol use: Yes     Alcohol/week: 14.0 standard drinks of alcohol     Types: 14 Cans of beer per week     Comment: weekends, sometimes   • Drug use: No   • Sexual activity: Yes   Other Topics Concern   • Not on file   Social History Narrative    Dog      Social Determinants of Health     Financial Resource Strain: Low Risk  (9/8/2023)    Overall Financial Resource Strain (CARDIA)    • Difficulty of Paying Living Expenses: Not hard at all   Food Insecurity: Not on file   Transportation Needs: No Transportation Needs (9/8/2023)    PRAPARE - Transportation    • Lack of Transportation (Medical): No    • Lack of Transportation (Non-Medical): No   Physical Activity: Not on file   Stress: Not on file   Social Connections: Not on file   Intimate Partner Violence: Not on file   Housing Stability: Not on file     Vitals:    04/19/24 0940   BP: 122/82   Pulse: 81   Resp: 16   SpO2: 99%   Weight: 75.6 kg (166 lb 9.6 oz)   Height: 5' 8\" (1.727 m)     Results for orders placed or performed in visit on 04/18/24   Hemoglobin A1C   Result Value Ref Range    Hemoglobin A1C 6.1 (H) Normal 4.0-5.6%; PreDiabetic 5.7-6.4%; Diabetic " ">=6.5%; Glycemic control for adults with diabetes <7.0% %     mg/dl   Albumin / creatinine urine ratio   Result Value Ref Range    Creatinine, Ur 120.6 Reference range not established. mg/dL    Albumin,U,Random 8.0 <20.0 mg/L    Albumin Creat Ratio 7 0 - 30 mg/g creatinine   Comprehensive metabolic panel   Result Value Ref Range    Sodium 139 135 - 147 mmol/L    Potassium 4.1 3.5 - 5.3 mmol/L    Chloride 105 96 - 108 mmol/L    CO2 24 21 - 32 mmol/L    ANION GAP 10 4 - 13 mmol/L    BUN 15 5 - 25 mg/dL    Creatinine 1.05 0.60 - 1.30 mg/dL    Glucose, Fasting 110 (H) 65 - 99 mg/dL    Calcium 9.6 8.4 - 10.2 mg/dL    AST 28 13 - 39 U/L    ALT 32 7 - 52 U/L    Alkaline Phosphatase 74 34 - 104 U/L    Total Protein 7.4 6.4 - 8.4 g/dL    Albumin 4.5 3.5 - 5.0 g/dL    Total Bilirubin 0.76 0.20 - 1.00 mg/dL    eGFR 72 ml/min/1.73sq m     Weight (last 2 days)     Date/Time Weight    04/19/24 0940 75.6 (166.6)        Body mass index is 25.33 kg/m².  BP      Temp      Pulse     Resp      SpO2        Vitals:    04/19/24 0940   Weight: 75.6 kg (166 lb 9.6 oz)     Vitals:    04/19/24 0940   Weight: 75.6 kg (166 lb 9.6 oz)       /82   Pulse 81   Resp 16   Ht 5' 8\" (1.727 m)   Wt 75.6 kg (166 lb 9.6 oz)   SpO2 99%   BMI 25.33 kg/m²          Physical Exam  Vitals and nursing note reviewed.   Constitutional:       General: He is not in acute distress.     Appearance: Normal appearance. He is well-developed. He is not ill-appearing, toxic-appearing or diaphoretic.   HENT:      Head: Normocephalic and atraumatic.      Right Ear: External ear normal.      Left Ear: External ear normal.   Eyes:      General: No scleral icterus.        Right eye: No discharge.         Left eye: No discharge.      Conjunctiva/sclera: Conjunctivae normal.      Pupils: Pupils are equal, round, and reactive to light.   Cardiovascular:      Rate and Rhythm: Normal rate and regular rhythm.      Heart sounds: Normal heart sounds. No murmur " heard.     No friction rub. No gallop.   Pulmonary:      Effort: No respiratory distress.      Breath sounds: No wheezing or rales.   Abdominal:      General: Bowel sounds are normal. There is no distension.      Palpations: Abdomen is soft. There is no mass.      Tenderness: There is no abdominal tenderness. There is no guarding or rebound.   Musculoskeletal:         General: No deformity.      Cervical back: Neck supple.   Lymphadenopathy:      Cervical: No cervical adenopathy.   Neurological:      Mental Status: He is alert.

## 2024-04-20 ENCOUNTER — HOSPITAL ENCOUNTER (OUTPATIENT)
Dept: MRI IMAGING | Facility: HOSPITAL | Age: 69
Discharge: HOME | End: 2024-04-20
Attending: ORTHOPAEDIC SURGERY
Payer: COMMERCIAL

## 2024-04-20 DIAGNOSIS — R29.898 WEAKNESS OF RIGHT SHOULDER: ICD-10-CM

## 2024-04-20 DIAGNOSIS — Z87.39 HISTORY OF CLOSED DISLOCATION OF SHOULDER: ICD-10-CM

## 2024-04-20 DIAGNOSIS — M25.511 ACUTE PAIN OF RIGHT SHOULDER: ICD-10-CM

## 2024-04-20 PROCEDURE — 73221 MRI JOINT UPR EXTREM W/O DYE: CPT

## 2024-04-21 NOTE — ASSESSMENT & PLAN NOTE
Lab Results   Component Value Date    HGBA1C 6.1 (H) 04/18/2024   I have counselled the pt to follow a healthy and balanced diet ,and recommend routine exercise.  I will be ordering diabetic laboratories including comprehensive metabolic panel, hemoglobin A1c, urine microalbumin, lipid panel.  Annual eye examination recommended

## 2024-04-22 ENCOUNTER — APPOINTMENT (OUTPATIENT)
Dept: PHYSICAL THERAPY | Facility: CLINIC | Age: 69
End: 2024-04-22
Payer: COMMERCIAL

## 2024-04-23 ENCOUNTER — OFFICE VISIT (OUTPATIENT)
Dept: OBGYN CLINIC | Facility: CLINIC | Age: 69
End: 2024-04-23
Payer: COMMERCIAL

## 2024-04-23 ENCOUNTER — TELEPHONE (OUTPATIENT)
Dept: INTERNAL MEDICINE CLINIC | Facility: CLINIC | Age: 69
End: 2024-04-23

## 2024-04-23 VITALS
HEIGHT: 68 IN | BODY MASS INDEX: 25.16 KG/M2 | DIASTOLIC BLOOD PRESSURE: 84 MMHG | WEIGHT: 166 LBS | SYSTOLIC BLOOD PRESSURE: 143 MMHG | HEART RATE: 56 BPM

## 2024-04-23 DIAGNOSIS — R93.7 ABNORMAL FINDINGS ON DIAGNOSTIC IMAGING OF OTHER PARTS OF MUSCULOSKELETAL SYSTEM: ICD-10-CM

## 2024-04-23 DIAGNOSIS — S46.011A TRAUMATIC COMPLETE TEAR OF RIGHT ROTATOR CUFF, INITIAL ENCOUNTER: Primary | ICD-10-CM

## 2024-04-23 PROCEDURE — 99214 OFFICE O/P EST MOD 30 MIN: CPT | Performed by: ORTHOPAEDIC SURGERY

## 2024-04-23 RX ORDER — CHLORHEXIDINE GLUCONATE ORAL RINSE 1.2 MG/ML
15 SOLUTION DENTAL ONCE
Status: CANCELLED | OUTPATIENT
Start: 2024-04-23 | End: 2024-04-23

## 2024-04-23 RX ORDER — CEFAZOLIN SODIUM 2 G/50ML
2000 SOLUTION INTRAVENOUS ONCE
Status: CANCELLED | OUTPATIENT
Start: 2024-05-08 | End: 2024-04-23

## 2024-04-23 NOTE — TELEPHONE ENCOUNTER
Pt walked in and asked me to ask you to call him to discuss MRI results and possible need for surgery

## 2024-04-23 NOTE — H&P (VIEW-ONLY)
CHIEF COMPLAINT / REASON FOR VISIT  Solomon Meyers is a 69 y.o. who is following up today to discuss the results of his recent imaging study.    HISTORY OF PRESENT ILLNESS  Patient reports they are doing about the same from when we last saw them.     OBJECTIVE  Right Shoulder  Negative tenderness to palpation over the acromioclavicular joint  Negative tenderness to palpation over the long head of the biceps tendon  Shoulder effusion none present  Shoulder abduction 30 degrees  Shoulder forward flexion Limited  Shoulder external rotation 50/50  Shoulder internal rotation T7/T7  Supraspinatus/ABD 2/5   Infraspinatus/ER 2/5   Skin is intact with no erythema, warmth or drainage  Motor strength intact distally  Sensation to light touch is normal in the axillary, radial, ulnar, and median nerve distributions.  Fingers warm and perfused    DIAGNOSTIC IMAGING:  Procedure: MRI shoulder right wo contrast    Result Date: 4/23/2024  Narrative: MRI SHOULDER RIGHT WO CONTRAST INDICATION:   Z87.39: Personal history of other diseases of the musculoskeletal system and connective tissue M25.511: Pain in right shoulder R29.898: Other symptoms and signs involving the musculoskeletal system. COMPARISON: Correlation is made with the prior radiograph dated 2/27/2024. TECHNIQUE:  Multiplanar/multisequence MR of the right shoulder was performed. Imaging performed on 1.5T MRI FINDINGS: SUBCUTANEOUS TISSUES: Normal JOINT EFFUSION: Small to moderate joint effusion. ACROMION PROCESS: Normal. ROTATOR CUFF: Full-thickness full width tears of the supraspinatus and infraspinatus tendons are seen with retracted tendon fibers to the level of the acromion. Mild subscapularis tendinosis. No significant muscular fatty atrophy. SUBACROMIAL/SUBDELTOID BURSA: Fluid seen communicating with the glenohumeral joint. LONG HEAD OF BICEPS TENDON: There is slight medial subluxation of the long head of the biceps tendon from the bicipital groove. Mild tendinosis  and tenosynovitis. GLENOID LABRUM: Superior labral degeneration. GLENOHUMERAL JOINT: Superior subluxation of the humeral head. Minimal glenohumeral joint articular cartilage thinning. ACROMIOCLAVICULAR JOINT: Mild to moderate acromioclavicular joint osteoarthrosis with mild marrow edema. This likely reflect stress reaction. Small subacromial spur. BONES: As mentioned above.     Impression: Full-thickness full width tears of the supraspinatus and infraspinatus tendons with retracted tendon fibers to the level of the acromion. No significant muscular fatty atrophy. Mild subscapularis tendinosis. Slight medial subluxation of the long head of the biceps tendon. Mild bicipital tendinosis and tenosynovitis. Superior labral degeneration with superior subluxation of the humeral head and minimal glenohumeral joint articular cartilage thinning. Mild to moderate acromioclavicular joint osteoarthrosis with mild marrow edema suggestive of stress reaction. Small subacromial spur. Small to moderate glenohumeral joint effusion communicating with the subacromial/subdeltoid bursa. Workstation performed: [id number]     Procedure: XR shoulder 2+ vw right    Result Date: 2/27/2024  Narrative: RIGHT SHOULDER INDICATION:   Pain in right shoulder. COMPARISON:  None. VIEWS:  XR SHOULDER 2+ VW RIGHT Images: 3 FINDINGS: There is no acute fracture or dislocation. Mild osteoarthritis acromioclavicular joint. No lytic or blastic osseous lesion. Soft tissues are unremarkable.     Impression: No acute osseous abnormality. Degenerative changes as described. Electronically signed: 02/27/2024 11:56 AM Stevan Harrell, MPH,MD     ASSESSMENT/PLAN:  Right rotator cuff tear    Solomon Meyers injured their shoulder acutely, resulting in a traumatic rotator cuff tear. We discussed the importance of the rotator cuff in the stability and strength of the shoulder and other overhead activities. We had an extensive discussion regarding the diagnosis and its  natural history. We also discussed both non-operative and operative treatment strategies. In an active individual who is frequently using the upper extremity for work and other activities, I would recommend rotator cuff repair. he as continued to experience significant symptoms and dysfunction and is ready to proceed with surgery. I certainly understand and am in agreement.    Pending medical clearance, we will plan to proceed with shoulder arthroscopy with rotator cuff debridement versus repair, possible biceps tenodesis, subacromial decompression,  and all other indicated procedures. We described the significant post-operative recovery, including a minimum of 6 to 9 months return to full strenuous upper extremity activity if everything goes well. We discussed the anticipated pre, intra, and postoperative course in great detail. Specifically, we discussed the recovery and rehabilitation protocol and time lines.There is certainly a risk of reinjury upon returning full strenuous activities.    We discussed risks of surgery, which include shoulder stiffness, infection, risk of reinjury and future arthritis.    Patient will schedule rotator cuff repair surgery. he will need a preoperative clearance/physical appointment and physical therapy.         Scribe Attestation      I,:  Kavon Geiger PA-C am acting as a scribe while in the presence of the attending physician.:       I,:  Gene Morrell MD personally performed the services described in this documentation    as scribed in my presence.:

## 2024-04-23 NOTE — PROGRESS NOTES
CHIEF COMPLAINT / REASON FOR VISIT  Solomon Meyers is a 69 y.o. who is following up today to discuss the results of his recent imaging study.    HISTORY OF PRESENT ILLNESS  Patient reports they are doing about the same from when we last saw them.     OBJECTIVE  Right Shoulder  Negative tenderness to palpation over the acromioclavicular joint  Negative tenderness to palpation over the long head of the biceps tendon  Shoulder effusion none present  Shoulder abduction 30 degrees  Shoulder forward flexion Limited  Shoulder external rotation 50/50  Shoulder internal rotation T7/T7  Supraspinatus/ABD 2/5   Infraspinatus/ER 2/5   Skin is intact with no erythema, warmth or drainage  Motor strength intact distally  Sensation to light touch is normal in the axillary, radial, ulnar, and median nerve distributions.  Fingers warm and perfused    DIAGNOSTIC IMAGING:  Procedure: MRI shoulder right wo contrast    Result Date: 4/23/2024  Narrative: MRI SHOULDER RIGHT WO CONTRAST INDICATION:   Z87.39: Personal history of other diseases of the musculoskeletal system and connective tissue M25.511: Pain in right shoulder R29.898: Other symptoms and signs involving the musculoskeletal system. COMPARISON: Correlation is made with the prior radiograph dated 2/27/2024. TECHNIQUE:  Multiplanar/multisequence MR of the right shoulder was performed. Imaging performed on 1.5T MRI FINDINGS: SUBCUTANEOUS TISSUES: Normal JOINT EFFUSION: Small to moderate joint effusion. ACROMION PROCESS: Normal. ROTATOR CUFF: Full-thickness full width tears of the supraspinatus and infraspinatus tendons are seen with retracted tendon fibers to the level of the acromion. Mild subscapularis tendinosis. No significant muscular fatty atrophy. SUBACROMIAL/SUBDELTOID BURSA: Fluid seen communicating with the glenohumeral joint. LONG HEAD OF BICEPS TENDON: There is slight medial subluxation of the long head of the biceps tendon from the bicipital groove. Mild tendinosis  and tenosynovitis. GLENOID LABRUM: Superior labral degeneration. GLENOHUMERAL JOINT: Superior subluxation of the humeral head. Minimal glenohumeral joint articular cartilage thinning. ACROMIOCLAVICULAR JOINT: Mild to moderate acromioclavicular joint osteoarthrosis with mild marrow edema. This likely reflect stress reaction. Small subacromial spur. BONES: As mentioned above.     Impression: Full-thickness full width tears of the supraspinatus and infraspinatus tendons with retracted tendon fibers to the level of the acromion. No significant muscular fatty atrophy. Mild subscapularis tendinosis. Slight medial subluxation of the long head of the biceps tendon. Mild bicipital tendinosis and tenosynovitis. Superior labral degeneration with superior subluxation of the humeral head and minimal glenohumeral joint articular cartilage thinning. Mild to moderate acromioclavicular joint osteoarthrosis with mild marrow edema suggestive of stress reaction. Small subacromial spur. Small to moderate glenohumeral joint effusion communicating with the subacromial/subdeltoid bursa. Workstation performed: [id number]     Procedure: XR shoulder 2+ vw right    Result Date: 2/27/2024  Narrative: RIGHT SHOULDER INDICATION:   Pain in right shoulder. COMPARISON:  None. VIEWS:  XR SHOULDER 2+ VW RIGHT Images: 3 FINDINGS: There is no acute fracture or dislocation. Mild osteoarthritis acromioclavicular joint. No lytic or blastic osseous lesion. Soft tissues are unremarkable.     Impression: No acute osseous abnormality. Degenerative changes as described. Electronically signed: 02/27/2024 11:56 AM Stevan Harrell, MPH,MD     ASSESSMENT/PLAN:  Right rotator cuff tear    Solomon Meyers injured their shoulder acutely, resulting in a traumatic rotator cuff tear. We discussed the importance of the rotator cuff in the stability and strength of the shoulder and other overhead activities. We had an extensive discussion regarding the diagnosis and its  natural history. We also discussed both non-operative and operative treatment strategies. In an active individual who is frequently using the upper extremity for work and other activities, I would recommend rotator cuff repair. he as continued to experience significant symptoms and dysfunction and is ready to proceed with surgery. I certainly understand and am in agreement.    Pending medical clearance, we will plan to proceed with shoulder arthroscopy with rotator cuff debridement versus repair, possible biceps tenodesis, subacromial decompression,  and all other indicated procedures. We described the significant post-operative recovery, including a minimum of 6 to 9 months return to full strenuous upper extremity activity if everything goes well. We discussed the anticipated pre, intra, and postoperative course in great detail. Specifically, we discussed the recovery and rehabilitation protocol and time lines.There is certainly a risk of reinjury upon returning full strenuous activities.    We discussed risks of surgery, which include shoulder stiffness, infection, risk of reinjury and future arthritis.    Patient will schedule rotator cuff repair surgery. he will need a preoperative clearance/physical appointment and physical therapy.         Scribe Attestation      I,:  Kavon Geiger PA-C am acting as a scribe while in the presence of the attending physician.:       I,:  Gene Morrell MD personally performed the services described in this documentation    as scribed in my presence.:

## 2024-04-24 ENCOUNTER — APPOINTMENT (OUTPATIENT)
Dept: PHYSICAL THERAPY | Facility: CLINIC | Age: 69
End: 2024-04-24
Payer: COMMERCIAL

## 2024-04-25 ENCOUNTER — APPOINTMENT (OUTPATIENT)
Dept: LAB | Facility: CLINIC | Age: 69
End: 2024-04-25
Payer: COMMERCIAL

## 2024-04-25 ENCOUNTER — TELEPHONE (OUTPATIENT)
Dept: OBGYN CLINIC | Facility: CLINIC | Age: 69
End: 2024-04-25

## 2024-04-25 DIAGNOSIS — Z01.818 PRE-OPERATIVE CLEARANCE: ICD-10-CM

## 2024-04-25 DIAGNOSIS — R93.7 ABNORMAL FINDINGS ON DIAGNOSTIC IMAGING OF OTHER PARTS OF MUSCULOSKELETAL SYSTEM: ICD-10-CM

## 2024-04-25 DIAGNOSIS — S46.011A TRAUMATIC COMPLETE TEAR OF RIGHT ROTATOR CUFF, INITIAL ENCOUNTER: ICD-10-CM

## 2024-04-25 LAB
ALBUMIN SERPL BCP-MCNC: 4.3 G/DL (ref 3.5–5)
ALP SERPL-CCNC: 69 U/L (ref 34–104)
ALT SERPL W P-5'-P-CCNC: 24 U/L (ref 7–52)
ANION GAP SERPL CALCULATED.3IONS-SCNC: 7 MMOL/L (ref 4–13)
AST SERPL W P-5'-P-CCNC: 21 U/L (ref 13–39)
BASOPHILS # BLD AUTO: 0.02 THOUSANDS/ÂΜL (ref 0–0.1)
BASOPHILS NFR BLD AUTO: 0 % (ref 0–1)
BILIRUB SERPL-MCNC: 0.52 MG/DL (ref 0.2–1)
BUN SERPL-MCNC: 15 MG/DL (ref 5–25)
CALCIUM SERPL-MCNC: 9.6 MG/DL (ref 8.4–10.2)
CHLORIDE SERPL-SCNC: 106 MMOL/L (ref 96–108)
CO2 SERPL-SCNC: 27 MMOL/L (ref 21–32)
CREAT SERPL-MCNC: 1.18 MG/DL (ref 0.6–1.3)
EOSINOPHIL # BLD AUTO: 0.08 THOUSAND/ÂΜL (ref 0–0.61)
EOSINOPHIL NFR BLD AUTO: 2 % (ref 0–6)
ERYTHROCYTE [DISTWIDTH] IN BLOOD BY AUTOMATED COUNT: 13 % (ref 11.6–15.1)
GFR SERPL CREATININE-BSD FRML MDRD: 63 ML/MIN/1.73SQ M
GLUCOSE P FAST SERPL-MCNC: 126 MG/DL (ref 65–99)
HCT VFR BLD AUTO: 46.6 % (ref 36.5–49.3)
HGB BLD-MCNC: 15.7 G/DL (ref 12–17)
IMM GRANULOCYTES # BLD AUTO: 0.01 THOUSAND/UL (ref 0–0.2)
IMM GRANULOCYTES NFR BLD AUTO: 0 % (ref 0–2)
LYMPHOCYTES # BLD AUTO: 1.92 THOUSANDS/ÂΜL (ref 0.6–4.47)
LYMPHOCYTES NFR BLD AUTO: 37 % (ref 14–44)
MCH RBC QN AUTO: 31.7 PG (ref 26.8–34.3)
MCHC RBC AUTO-ENTMCNC: 33.7 G/DL (ref 31.4–37.4)
MCV RBC AUTO: 94 FL (ref 82–98)
MONOCYTES # BLD AUTO: 0.48 THOUSAND/ÂΜL (ref 0.17–1.22)
MONOCYTES NFR BLD AUTO: 9 % (ref 4–12)
NEUTROPHILS # BLD AUTO: 2.68 THOUSANDS/ÂΜL (ref 1.85–7.62)
NEUTS SEG NFR BLD AUTO: 52 % (ref 43–75)
NRBC BLD AUTO-RTO: 0 /100 WBCS
PLATELET # BLD AUTO: 192 THOUSANDS/UL (ref 149–390)
PMV BLD AUTO: 9.7 FL (ref 8.9–12.7)
POTASSIUM SERPL-SCNC: 4.5 MMOL/L (ref 3.5–5.3)
PROT SERPL-MCNC: 7.1 G/DL (ref 6.4–8.4)
RBC # BLD AUTO: 4.95 MILLION/UL (ref 3.88–5.62)
SODIUM SERPL-SCNC: 140 MMOL/L (ref 135–147)
WBC # BLD AUTO: 5.19 THOUSAND/UL (ref 4.31–10.16)

## 2024-04-25 PROCEDURE — 85025 COMPLETE CBC W/AUTO DIFF WBC: CPT

## 2024-04-25 PROCEDURE — 93005 ELECTROCARDIOGRAM TRACING: CPT

## 2024-04-25 PROCEDURE — 36415 COLL VENOUS BLD VENIPUNCTURE: CPT

## 2024-04-25 PROCEDURE — 80053 COMPREHEN METABOLIC PANEL: CPT

## 2024-04-25 NOTE — TELEPHONE ENCOUNTER
Patient called requesting to move up surgery. Agreed upon 5/8/24 at [facility]. Related appointments updated. Questions answered.

## 2024-04-26 ENCOUNTER — CONSULT (OUTPATIENT)
Dept: INTERNAL MEDICINE CLINIC | Facility: CLINIC | Age: 69
End: 2024-04-26
Payer: COMMERCIAL

## 2024-04-26 VITALS
HEIGHT: 68 IN | WEIGHT: 169.2 LBS | DIASTOLIC BLOOD PRESSURE: 78 MMHG | BODY MASS INDEX: 25.64 KG/M2 | HEART RATE: 65 BPM | SYSTOLIC BLOOD PRESSURE: 126 MMHG | OXYGEN SATURATION: 97 %

## 2024-04-26 DIAGNOSIS — Z01.818 PREOP EXAM FOR INTERNAL MEDICINE: Primary | ICD-10-CM

## 2024-04-26 DIAGNOSIS — S46.011A TRAUMATIC COMPLETE TEAR OF RIGHT ROTATOR CUFF, INITIAL ENCOUNTER: ICD-10-CM

## 2024-04-26 PROCEDURE — 99213 OFFICE O/P EST LOW 20 MIN: CPT | Performed by: INTERNAL MEDICINE

## 2024-04-26 NOTE — ASSESSMENT & PLAN NOTE
Patient is medically cleared for shoulder surgery, no cardiopulmonary complaints, exam today is okay, blood pressure is good.  Patient's chronic conditions of high cholesterol and hypothyroidism are being treated.  His diabetes has been controlled with diet.  Patient not on any blood thinners.  No problems with anesthesia in the past.

## 2024-04-26 NOTE — PATIENT INSTRUCTIONS
Problem List Items Addressed This Visit          Musculoskeletal and Integument    Traumatic complete tear of right rotator cuff, initial encounter       Surgery/Wound/Pain    Preop exam for internal medicine - Primary     Patient is medically cleared for shoulder surgery, no cardiopulmonary complaints, exam today is okay, blood pressure is good.  Patient's chronic conditions of high cholesterol and hypothyroidism are being treated.  His diabetes has been controlled with diet.  Patient not on any blood thinners.  No problems with anesthesia in the past.

## 2024-04-26 NOTE — LETTER
2024     Gene Morrell MD  [id number] [facility] [id number]    Patient: Solomon Meyers   YOB: 1955   Date of Visit: 2024       Dear Dr. Morrell:    Thank you for referring Solomon Meyers to me for evaluation. Below are my notes for this consultation.    If you have questions, please do not hesitate to call me. I look forward to following your patient along with you.         Sincerely,        Lj Romano MD        CC: No Recipients    Lj Romano MD  2024  1:11 PM  Incomplete  Name: Solomon Meyers      : 1955      MRN: [id number]  Encounter Provider: Lj Romano MD  Encounter Date: 2024   [facility]    Assessment & Plan     1. Preop exam for internal medicine  Assessment & Plan:  Patient is medically cleared for shoulder surgery, no cardiopulmonary complaints, exam today is okay, blood pressure is good.  Patient's chronic conditions of high cholesterol and hypothyroidism are being treated.  His diabetes has been controlled with diet.  Patient not on any blood thinners.  No problems with anesthesia in the past.      2. Traumatic complete tear of right rotator cuff, initial encounter  -     Ambulatory referral to Family Practice           Subjective     I am seeing patient in coverage for their PCP.  Patient here for preop medical clearance for shoulder surgery.  No acute cardiopulmonary complaints, no chest pain, shortness of breath, no lightheadedness.  No problems with anesthesia in the past      Review of Systems   Constitutional:  Negative for chills, fatigue and fever.   HENT:  Negative for congestion, nosebleeds, postnasal drip, sore throat and trouble swallowing.    Eyes:  Negative for pain.   Respiratory:  Negative for cough, chest tightness, shortness of breath and wheezing.    Cardiovascular:  Negative for chest pain, palpitations and leg swelling.   Gastrointestinal:  Negative for abdominal pain,  constipation, diarrhea, nausea and vomiting.   Endocrine: Negative for polydipsia and polyuria.   Genitourinary:  Negative for dysuria, flank pain and hematuria.   Skin:  Negative for rash.   Neurological:  Negative for dizziness, tremors, light-headedness and headaches.   Hematological:  Does not bruise/bleed easily.   Psychiatric/Behavioral:  Negative for confusion and dysphoric mood. The patient is not nervous/anxious.        Past Medical History:   Diagnosis Date   • Benign neoplasm of colon    • Colon polyp    • GERD (gastroesophageal reflux disease)     mild diet controlled   • Hyperlipidemia    • Pituitary mass (HCC)      Past Surgical History:   Procedure Laterality Date   • INGUINAL HERNIA REPAIR Bilateral    • MOHS SURGERY  2000    nose   • [place] COLONOSCOPY FLX DX W/COLLJ SPEC WHEN PFRMD N/A 12/18/2018    Procedure: COLONOSCOPY;  Surgeon: Kevin Flores MD;  Location: [facility];  Service: Gastroenterology     Family History   Problem Relation Age of Onset   • Varicose Veins Mother    • Hypertension Mother    • Parkinsonism Father    • Hypertension Father    • No Known Problems Sister    • No Known Problems Brother    • No Known Problems Sister    • No Known Problems Sister    • No Known Problems Sister    • No Known Problems Sister    • No Known Problems Brother      Social History     Socioeconomic History   • Marital status: /Civil Union     Spouse name: None   • Number of children: None   • Years of education: None   • Highest education level: None   Occupational History   • None   Tobacco Use   • Smoking status: Never     Passive exposure: Never   • Smokeless tobacco: Never   Vaping Use   • Vaping status: Never Used   Substance and Sexual Activity   • Alcohol use: Yes     Alcohol/week: 14.0 standard drinks of alcohol     Types: 14 Cans of beer per week     Comment: weekends, sometimes   • Drug use: No   • Sexual activity: Yes   Other Topics Concern   • None   Social History Narrative    Dog   "    Social Determinants of Health     Financial Resource Strain: Low Risk  (9/8/2023)    Overall Financial Resource Strain (CARDIA)    • Difficulty of Paying Living Expenses: Not hard at all   Food Insecurity: Not on file   Transportation Needs: No Transportation Needs (9/8/2023)    PRAPARE - Transportation    • Lack of Transportation (Medical): No    • Lack of Transportation (Non-Medical): No   Physical Activity: Not on file   Stress: Not on file   Social Connections: Not on file   Intimate Partner Violence: Not on file   Housing Stability: Not on file     Current Outpatient Medications on File Prior to Visit   Medication Sig   • levothyroxine (Euthyrox) 25 mcg tablet Take 1 tablet (25 mcg total) by mouth daily   • rosuvastatin (CRESTOR) 5 mg tablet Take 1 tablet (5 mg total) by mouth daily     No Known Allergies  Immunization History   Administered Date(s) Administered   • COVID-19 MODERNA VACC 0.25 ML IM BOOSTER 10/26/2021   • COVID-19 MODERNA VACC 0.5 ML IM 12/29/2020, 01/25/2021   • Influenza Quadrivalent 3 years and older 11/29/2018   • Influenza, high dose seasonal 0.7 mL 10/07/2023   • Influenza, recombinant, quadrivalent,injectable, preservative free 09/26/2019   • Pneumococcal Conjugate Vaccine 20-valent (Pcv20), Polysace 09/08/2023   • Pneumococcal Polysaccharide PPV23 08/31/2021   • Tdap 09/11/2018       Objective     /78 (BP Location: Left arm, Patient Position: Sitting, Cuff Size: Large)   Pulse 65   Ht 5' 8\" (1.727 m)   Wt 76.7 kg (169 lb 3.2 oz)   SpO2 97%   BMI 25.73 kg/m²     Physical Exam  Vitals reviewed.   Constitutional:       General: He is not in acute distress.     Appearance: He is well-developed.   HENT:      Head: Normocephalic and atraumatic.      Right Ear: External ear normal.      Left Ear: External ear normal.   Eyes:      General: No scleral icterus.     Conjunctiva/sclera: Conjunctivae normal.   Neck:      Thyroid: No thyromegaly.      Trachea: No tracheal deviation. "   Cardiovascular:      Rate and Rhythm: Normal rate and regular rhythm.      Heart sounds: Normal heart sounds.   Pulmonary:      Effort: Pulmonary effort is normal. No respiratory distress.      Breath sounds: Normal breath sounds. No wheezing or rales.   Musculoskeletal:      Cervical back: Normal range of motion and neck supple.      Right lower leg: No edema.      Left lower leg: No edema.   Lymphadenopathy:      Cervical: No cervical adenopathy.   Neurological:      Mental Status: He is alert and oriented to person, place, and time.   Psychiatric:         Behavior: Behavior normal.         Thought Content: Thought content normal.         Judgment: Judgment normal.       Lj Romano MD

## 2024-04-26 NOTE — PROGRESS NOTES
Name: Solomon Meyers      : 1955      MRN: [id number]  Encounter Provider: Lj Romano MD  Encounter Date: 2024   Enc[facility] department: [facility]    Assessment & Plan     1. Preop exam for internal medicine  Assessment & Plan:  Patient is medically cleared for shoulder surgery, no cardiopulmonary complaints, exam today is okay, blood pressure is good.  Patient's chronic conditions of high cholesterol and hypothyroidism are being treated.  His diabetes has been controlled with diet.  Patient not on any blood thinners.  No problems with anesthesia in the past.      2. Traumatic complete tear of right rotator cuff, initial encounter  -     Ambulatory referral to Family Practice           Subjective     I am seeing patient in coverage for their PCP.  Patient here for preop medical clearance for shoulder surgery.  No acute cardiopulmonary complaints, no chest pain, shortness of breath, no lightheadedness.  No problems with anesthesia in the past      Review of Systems   Constitutional:  Negative for chills, fatigue and fever.   HENT:  Negative for congestion, nosebleeds, postnasal drip, sore throat and trouble swallowing.    Eyes:  Negative for pain.   Respiratory:  Negative for cough, chest tightness, shortness of breath and wheezing.    Cardiovascular:  Negative for chest pain, palpitations and leg swelling.   Gastrointestinal:  Negative for abdominal pain, constipation, diarrhea, nausea and vomiting.   Endocrine: Negative for polydipsia and polyuria.   Genitourinary:  Negative for dysuria, flank pain and hematuria.   Skin:  Negative for rash.   Neurological:  Negative for dizziness, tremors, light-headedness and headaches.   Hematological:  Does not bruise/bleed easily.   Psychiatric/Behavioral:  Negative for confusion and dysphoric mood. The patient is not nervous/anxious.        Past Medical History:   Diagnosis Date   • Benign neoplasm of colon    • Colon polyp    • GERD  (gastroesophageal reflux disease)     mild diet controlled   • Hyperlipidemia    • Pituitary mass (HCC)      Past Surgical History:   Procedure Laterality Date   • INGUINAL HERNIA REPAIR Bilateral    • MOHS SURGERY  2000    nose   • [place] COLONOSCOPY FLX DX W/COLLJ SPEC WHEN PFRMD N/A 12/18/2018    Procedure: COLONOSCOPY;  Surgeon: Kevin Flores MD;  Location: [facility];  Service: Gastroenterology     Family History   Problem Relation Age of Onset   • Varicose Veins Mother    • Hypertension Mother    • Parkinsonism Father    • Hypertension Father    • No Known Problems Sister    • No Known Problems Brother    • No Known Problems Sister    • No Known Problems Sister    • No Known Problems Sister    • No Known Problems Sister    • No Known Problems Brother      Social History     Socioeconomic History   • Marital status: /Civil Union     Spouse name: None   • Number of children: None   • Years of education: None   • Highest education level: None   Occupational History   • None   Tobacco Use   • Smoking status: Never     Passive exposure: Never   • Smokeless tobacco: Never   Vaping Use   • Vaping status: Never Used   Substance and Sexual Activity   • Alcohol use: Yes     Alcohol/week: 14.0 standard drinks of alcohol     Types: 14 Cans of beer per week     Comment: weekends, sometimes   • Drug use: No   • Sexual activity: Yes   Other Topics Concern   • None   Social History Narrative    Dog      Social Determinants of Health     Financial Resource Strain: Low Risk  (9/8/2023)    Overall Financial Resource Strain (CARDIA)    • Difficulty of Paying Living Expenses: Not hard at all   Food Insecurity: Not on file   Transportation Needs: No Transportation Needs (9/8/2023)    PRAPARE - Transportation    • Lack of Transportation (Medical): No    • Lack of Transportation (Non-Medical): No   Physical Activity: Not on file   Stress: Not on file   Social Connections: Not on file   Intimate Partner Violence: Not on file  "  Housing Stability: Not on file     Current Outpatient Medications on File Prior to Visit   Medication Sig   • levothyroxine (Euthyrox) 25 mcg tablet Take 1 tablet (25 mcg total) by mouth daily   • rosuvastatin (CRESTOR) 5 mg tablet Take 1 tablet (5 mg total) by mouth daily     No Known Allergies  Immunization History   Administered Date(s) Administered   • COVID-19 MODERNA VACC 0.25 ML IM BOOSTER 10/26/2021   • COVID-19 MODERNA VACC 0.5 ML IM 12/29/2020, 01/25/2021   • Influenza Quadrivalent 3 years and older 11/29/2018   • Influenza, high dose seasonal 0.7 mL 10/07/2023   • Influenza, recombinant, quadrivalent,injectable, preservative free 09/26/2019   • Pneumococcal Conjugate Vaccine 20-valent (Pcv20), Polysace 09/08/2023   • Pneumococcal Polysaccharide PPV23 08/31/2021   • Tdap 09/11/2018       Objective     /78 (BP Location: Left arm, Patient Position: Sitting, Cuff Size: Large)   Pulse 65   Ht 5' 8\" (1.727 m)   Wt 76.7 kg (169 lb 3.2 oz)   SpO2 97%   BMI 25.73 kg/m²     Physical Exam  Vitals reviewed.   Constitutional:       General: He is not in acute distress.     Appearance: He is well-developed.   HENT:      Head: Normocephalic and atraumatic.      Right Ear: External ear normal.      Left Ear: External ear normal.   Eyes:      General: No scleral icterus.     Conjunctiva/sclera: Conjunctivae normal.   Neck:      Thyroid: No thyromegaly.      Trachea: No tracheal deviation.   Cardiovascular:      Rate and Rhythm: Normal rate and regular rhythm.      Heart sounds: Normal heart sounds.   Pulmonary:      Effort: Pulmonary effort is normal. No respiratory distress.      Breath sounds: Normal breath sounds. No wheezing or rales.   Musculoskeletal:      Cervical back: Normal range of motion and neck supple.      Right lower leg: No edema.      Left lower leg: No edema.   Lymphadenopathy:      Cervical: No cervical adenopathy.   Neurological:      Mental Status: He is alert and oriented to person, " place, and time.   Psychiatric:         Behavior: Behavior normal.         Thought Content: Thought content normal.         Judgment: Judgment normal.       Lj Romano MD

## 2024-04-28 LAB
ATRIAL RATE: 67 BPM
P AXIS: 50 DEGREES
PR INTERVAL: 152 MS
QRS AXIS: -28 DEGREES
QRSD INTERVAL: 72 MS
QT INTERVAL: 406 MS
QTC INTERVAL: 429 MS
T WAVE AXIS: 31 DEGREES
VENTRICULAR RATE: 67 BPM

## 2024-04-28 PROCEDURE — 93010 ELECTROCARDIOGRAM REPORT: CPT | Performed by: INTERNAL MEDICINE

## 2024-04-29 ENCOUNTER — APPOINTMENT (OUTPATIENT)
Dept: PHYSICAL THERAPY | Facility: CLINIC | Age: 69
End: 2024-04-29
Payer: COMMERCIAL

## 2024-05-03 ENCOUNTER — ANESTHESIA EVENT (OUTPATIENT)
Age: 69
End: 2024-05-03
Payer: COMMERCIAL

## 2024-05-03 NOTE — PRE-PROCEDURE INSTRUCTIONS
Pre-Surgery Instructions:   Medication Instructions    levothyroxine (Euthyrox) 25 mcg tablet Take day of surgery.    rosuvastatin (CRESTOR) 5 mg tablet Take day of surgery.   Medication instructions for day surgery reviewed. Please use only a sip of water to take your instructed medications. Avoid all over the counter vitamins, supplements and NSAIDS for one week prior to surgery per anesthesia guidelines. Tylenol is ok to take as needed.     You will receive a call one business day prior to surgery with an arrival time and hospital directions. If your surgery is scheduled on a Monday, the hospital will be calling you on the Friday prior to your surgery. If you have not heard from anyone by 8pm, please call the hospital supervisor through the hospital  at [phone]. ([place] [phone] or [place] [phone]).    Do not eat or drink anything after midnight the night before your surgery, including candy, mints, lifesavers, or chewing gum. Do not drink alcohol 24hrs before your surgery. Try not to smoke at least 24hrs before your surgery.       Follow the pre surgery showering instructions as listed in the “My Surgical Experience Booklet” or otherwise provided by your surgeon's office. Do not use a blade to shave the surgical area 1 week before surgery. It is okay to use a clean electric clippers up to 24 hours before surgery. Do not apply any lotions, creams, including makeup, cologne, deodorant, or perfumes after showering on the day of your surgery. Do not use dry shampoo, hair spray, hair gel, or any type of hair products.     No contact lenses, eye make-up, or artificial eyelashes. Remove nail polish, including gel polish, and any artificial, gel, or acrylic nails if possible. Remove all jewelry including rings and body piercing jewelry.     Wear causal clothing that is easy to take on and off. Consider your type of surgery.    Keep any valuables, jewelry, piercings at home. Please bring any  specially ordered equipment (sling, braces) if indicated.    Arrange for a responsible person to drive you to and from the hospital on the day of your surgery. Please confirm the visitor policy for the day of your procedure when you receive your phone call with an arrival time.     Call the surgeon's office with any new illnesses, exposures, or additional questions prior to surgery.    Please reference your “My Surgical Experience Booklet” for additional information to prepare for your upcoming surgery.

## 2024-05-08 ENCOUNTER — ANESTHESIA (OUTPATIENT)
Age: 69
End: 2024-05-08
Payer: COMMERCIAL

## 2024-05-08 ENCOUNTER — HOSPITAL ENCOUNTER (OUTPATIENT)
Age: 69
Setting detail: OUTPATIENT SURGERY
Discharge: HOME | End: 2024-05-08
Attending: ORTHOPAEDIC SURGERY | Admitting: ORTHOPAEDIC SURGERY
Payer: COMMERCIAL

## 2024-05-08 VITALS
OXYGEN SATURATION: 98 % | SYSTOLIC BLOOD PRESSURE: 134 MMHG | DIASTOLIC BLOOD PRESSURE: 72 MMHG | TEMPERATURE: 97.2 F | WEIGHT: 165 LBS | RESPIRATION RATE: 18 BRPM | HEART RATE: 64 BPM | BODY MASS INDEX: 25.09 KG/M2

## 2024-05-08 DIAGNOSIS — S46.011A TRAUMATIC COMPLETE TEAR OF RIGHT ROTATOR CUFF, INITIAL ENCOUNTER: Primary | ICD-10-CM

## 2024-05-08 DIAGNOSIS — E03.8 SECONDARY HYPOTHYROIDISM: ICD-10-CM

## 2024-05-08 LAB — GLUCOSE SERPL-MCNC: 127 MG/DL (ref 65–140)

## 2024-05-08 PROCEDURE — 29826 SHO ARTHRS SRG DECOMPRESSION: CPT

## 2024-05-08 PROCEDURE — C1713 ANCHOR/SCREW BN/BN,TIS/BN: HCPCS | Performed by: ORTHOPAEDIC SURGERY

## 2024-05-08 PROCEDURE — 29828 SHO ARTHRS SRG BICP TENODSIS: CPT | Performed by: ORTHOPAEDIC SURGERY

## 2024-05-08 PROCEDURE — 29827 SHO ARTHRS SRG RT8TR CUF RPR: CPT

## 2024-05-08 PROCEDURE — 29828 SHO ARTHRS SRG BICP TENODSIS: CPT

## 2024-05-08 PROCEDURE — C9290 INJ, BUPIVACAINE LIPOSOME: HCPCS | Performed by: ANESTHESIOLOGY

## 2024-05-08 PROCEDURE — 29826 SHO ARTHRS SRG DECOMPRESSION: CPT | Performed by: ORTHOPAEDIC SURGERY

## 2024-05-08 PROCEDURE — 29827 SHO ARTHRS SRG RT8TR CUF RPR: CPT | Performed by: ORTHOPAEDIC SURGERY

## 2024-05-08 PROCEDURE — 82948 REAGENT STRIP/BLOOD GLUCOSE: CPT

## 2024-05-08 DEVICE — SP FBRTAK RC TGRTPE WH/BLK & STTPE WH/BL
Type: IMPLANTABLE DEVICE | Site: SHOULDER | Status: FUNCTIONAL
Brand: ARTHREX®

## 2024-05-08 DEVICE — 4.75MM BC KNOTLESS SWIVELOCK
Type: IMPLANTABLE DEVICE | Site: SHOULDER | Status: FUNCTIONAL
Brand: ARTHREX®

## 2024-05-08 DEVICE — SP FBRTAK RC FBRTPE BLK/BLU & STTPE BLU
Type: IMPLANTABLE DEVICE | Site: SHOULDER | Status: FUNCTIONAL
Brand: ARTHREX®

## 2024-05-08 RX ORDER — ROCURONIUM BROMIDE 10 MG/ML
INJECTION, SOLUTION INTRAVENOUS AS NEEDED
Status: DISCONTINUED | OUTPATIENT
Start: 2024-05-08 | End: 2024-05-08

## 2024-05-08 RX ORDER — BUPIVACAINE HYDROCHLORIDE 5 MG/ML
INJECTION, SOLUTION EPIDURAL; INTRACAUDAL
Status: COMPLETED | OUTPATIENT
Start: 2024-05-08 | End: 2024-05-08

## 2024-05-08 RX ORDER — FENTANYL CITRATE 50 UG/ML
INJECTION, SOLUTION INTRAMUSCULAR; INTRAVENOUS AS NEEDED
Status: DISCONTINUED | OUTPATIENT
Start: 2024-05-08 | End: 2024-05-08

## 2024-05-08 RX ORDER — ONDANSETRON 2 MG/ML
INJECTION INTRAMUSCULAR; INTRAVENOUS AS NEEDED
Status: DISCONTINUED | OUTPATIENT
Start: 2024-05-08 | End: 2024-05-08

## 2024-05-08 RX ORDER — ONDANSETRON 2 MG/ML
4 INJECTION INTRAMUSCULAR; INTRAVENOUS ONCE AS NEEDED
Status: DISCONTINUED | OUTPATIENT
Start: 2024-05-08 | End: 2024-05-08 | Stop reason: HOSPADM

## 2024-05-08 RX ORDER — LABETALOL HYDROCHLORIDE 5 MG/ML
5 INJECTION, SOLUTION INTRAVENOUS
Status: DISCONTINUED | OUTPATIENT
Start: 2024-05-08 | End: 2024-05-08 | Stop reason: HOSPADM

## 2024-05-08 RX ORDER — LIDOCAINE HYDROCHLORIDE 10 MG/ML
INJECTION, SOLUTION EPIDURAL; INFILTRATION; INTRACAUDAL; PERINEURAL AS NEEDED
Status: DISCONTINUED | OUTPATIENT
Start: 2024-05-08 | End: 2024-05-08

## 2024-05-08 RX ORDER — CHLORHEXIDINE GLUCONATE ORAL RINSE 1.2 MG/ML
15 SOLUTION DENTAL ONCE
Status: COMPLETED | OUTPATIENT
Start: 2024-05-08 | End: 2024-05-08

## 2024-05-08 RX ORDER — PROPOFOL 10 MG/ML
INJECTION, EMULSION INTRAVENOUS AS NEEDED
Status: DISCONTINUED | OUTPATIENT
Start: 2024-05-08 | End: 2024-05-08

## 2024-05-08 RX ORDER — SODIUM CHLORIDE, SODIUM LACTATE, POTASSIUM CHLORIDE, CALCIUM CHLORIDE 600; 310; 30; 20 MG/100ML; MG/100ML; MG/100ML; MG/100ML
125 INJECTION, SOLUTION INTRAVENOUS CONTINUOUS
Status: DISCONTINUED | OUTPATIENT
Start: 2024-05-08 | End: 2024-05-08 | Stop reason: HOSPADM

## 2024-05-08 RX ORDER — PROMETHAZINE HYDROCHLORIDE 25 MG/ML
12.5 INJECTION, SOLUTION INTRAMUSCULAR; INTRAVENOUS ONCE AS NEEDED
Status: DISCONTINUED | OUTPATIENT
Start: 2024-05-08 | End: 2024-05-08 | Stop reason: HOSPADM

## 2024-05-08 RX ORDER — ACETAMINOPHEN 325 MG/1
325 TABLET ORAL EVERY 6 HOURS PRN
Qty: 30 TABLET | Refills: 0 | Status: SHIPPED | OUTPATIENT
Start: 2024-05-08

## 2024-05-08 RX ORDER — DEXAMETHASONE SODIUM PHOSPHATE 10 MG/ML
INJECTION, SOLUTION INTRAMUSCULAR; INTRAVENOUS AS NEEDED
Status: DISCONTINUED | OUTPATIENT
Start: 2024-05-08 | End: 2024-05-08

## 2024-05-08 RX ORDER — HYDROMORPHONE HCL/PF 1 MG/ML
0.5 SYRINGE (ML) INJECTION
Status: DISCONTINUED | OUTPATIENT
Start: 2024-05-08 | End: 2024-05-08 | Stop reason: HOSPADM

## 2024-05-08 RX ORDER — TRANEXAMIC ACID 10 MG/ML
INJECTION, SOLUTION INTRAVENOUS AS NEEDED
Status: DISCONTINUED | OUTPATIENT
Start: 2024-05-08 | End: 2024-05-08

## 2024-05-08 RX ORDER — ALBUTEROL SULFATE 2.5 MG/3ML
2.5 SOLUTION RESPIRATORY (INHALATION) ONCE AS NEEDED
Status: DISCONTINUED | OUTPATIENT
Start: 2024-05-08 | End: 2024-05-08 | Stop reason: HOSPADM

## 2024-05-08 RX ORDER — MEPERIDINE HYDROCHLORIDE 50 MG/ML
12.5 INJECTION INTRAMUSCULAR; INTRAVENOUS; SUBCUTANEOUS ONCE
Status: DISCONTINUED | OUTPATIENT
Start: 2024-05-08 | End: 2024-05-08 | Stop reason: HOSPADM

## 2024-05-08 RX ORDER — SODIUM CHLORIDE, SODIUM LACTATE, POTASSIUM CHLORIDE, CALCIUM CHLORIDE 600; 310; 30; 20 MG/100ML; MG/100ML; MG/100ML; MG/100ML
100 INJECTION, SOLUTION INTRAVENOUS CONTINUOUS
Status: DISCONTINUED | OUTPATIENT
Start: 2024-05-08 | End: 2024-05-08 | Stop reason: HOSPADM

## 2024-05-08 RX ORDER — OXYCODONE HYDROCHLORIDE 5 MG/1
5 TABLET ORAL EVERY 4 HOURS PRN
Status: DISCONTINUED | OUTPATIENT
Start: 2024-05-08 | End: 2024-05-08 | Stop reason: HOSPADM

## 2024-05-08 RX ORDER — NAPROXEN 500 MG/1
500 TABLET ORAL 2 TIMES DAILY WITH MEALS
Qty: 60 TABLET | Refills: 0 | Status: SHIPPED | OUTPATIENT
Start: 2024-05-08

## 2024-05-08 RX ORDER — MIDAZOLAM HYDROCHLORIDE 2 MG/2ML
INJECTION, SOLUTION INTRAMUSCULAR; INTRAVENOUS AS NEEDED
Status: DISCONTINUED | OUTPATIENT
Start: 2024-05-08 | End: 2024-05-08

## 2024-05-08 RX ORDER — FENTANYL CITRATE/PF 50 MCG/ML
25 SYRINGE (ML) INJECTION
Status: DISCONTINUED | OUTPATIENT
Start: 2024-05-08 | End: 2024-05-08 | Stop reason: HOSPADM

## 2024-05-08 RX ORDER — OXYCODONE HYDROCHLORIDE 5 MG/1
5 TABLET ORAL EVERY 4 HOURS PRN
Qty: 15 TABLET | Refills: 0 | Status: SHIPPED | OUTPATIENT
Start: 2024-05-08

## 2024-05-08 RX ORDER — CEFAZOLIN SODIUM 2 G/50ML
2000 SOLUTION INTRAVENOUS ONCE
Status: COMPLETED | OUTPATIENT
Start: 2024-05-08 | End: 2024-05-08

## 2024-05-08 RX ADMIN — SUGAMMADEX 150 MG: 100 INJECTION, SOLUTION INTRAVENOUS at 08:55

## 2024-05-08 RX ADMIN — LIDOCAINE HYDROCHLORIDE 50 MG: 10 INJECTION, SOLUTION EPIDURAL; INFILTRATION; INTRACAUDAL; PERINEURAL at 07:35

## 2024-05-08 RX ADMIN — SODIUM CHLORIDE, SODIUM LACTATE, POTASSIUM CHLORIDE, AND CALCIUM CHLORIDE 100 ML/HR: .6; .31; .03; .02 INJECTION, SOLUTION INTRAVENOUS at 06:26

## 2024-05-08 RX ADMIN — MIDAZOLAM 2 MG: 1 INJECTION INTRAMUSCULAR; INTRAVENOUS at 06:52

## 2024-05-08 RX ADMIN — DEXAMETHASONE SODIUM PHOSPHATE 10 MG: 10 INJECTION INTRAMUSCULAR; INTRAVENOUS at 07:56

## 2024-05-08 RX ADMIN — TRANEXAMIC ACID 1000 MG: 10 INJECTION, SOLUTION INTRAVENOUS at 08:11

## 2024-05-08 RX ADMIN — FENTANYL CITRATE 25 MCG: 50 INJECTION INTRAMUSCULAR; INTRAVENOUS at 08:47

## 2024-05-08 RX ADMIN — CHLORHEXIDINE GLUCONATE 15 ML: 1.2 RINSE ORAL at 06:26

## 2024-05-08 RX ADMIN — ONDANSETRON 4 MG: 2 INJECTION INTRAMUSCULAR; INTRAVENOUS at 07:56

## 2024-05-08 RX ADMIN — BUPIVACAINE HYDROCHLORIDE 5 ML: 5 INJECTION, SOLUTION EPIDURAL; INTRACAUDAL; PERINEURAL at 06:57

## 2024-05-08 RX ADMIN — PROPOFOL 200 MG: 10 INJECTION, EMULSION INTRAVENOUS at 07:35

## 2024-05-08 RX ADMIN — BUPIVACAINE 20 ML: 13.3 INJECTION, SUSPENSION, LIPOSOMAL INFILTRATION at 06:57

## 2024-05-08 RX ADMIN — FENTANYL CITRATE 25 MCG: 50 INJECTION INTRAMUSCULAR; INTRAVENOUS at 08:30

## 2024-05-08 RX ADMIN — CEFAZOLIN SODIUM 2000 MG: 2 SOLUTION INTRAVENOUS at 07:42

## 2024-05-08 RX ADMIN — FENTANYL CITRATE 50 MCG: 50 INJECTION INTRAMUSCULAR; INTRAVENOUS at 07:35

## 2024-05-08 RX ADMIN — ROCURONIUM BROMIDE 50 MG: 10 INJECTION, SOLUTION INTRAVENOUS at 07:35

## 2024-05-08 NOTE — DISCHARGE INSTR - AVS FIRST PAGE
POSTOPERATIVE INSTRUCTIONS following ROTATOR CUFF REPAIR    MEDICATIONS:  Resume all home medications unless otherwise instructed by your surgeon.  Pain Medication:  Oxycodone 5 mg, 1 tablets every 4 hours as needed  If you were given a regional anesthetic (nerve block), please begin taking the pain medication as soon as you get home, even if you have minimal or no pain.  DO NOT WAIT FOR THE NERVE BLOCK TO WEAR OFF.  Possible side effects include nausea, constipation, and urinary retention.  If you experience these side effects, please call our office for assistance.  Pain med refills are authorized only during office hours (8am-4pm, Mon-Fri).  Anti-Inflammatory:  Naproxen 500 mg, 1 tablet every 12 hours for 4 weeks and Tylenol 325 mg, 1-2 tablets every 6 hours for 4 weeks  TAKE WITH FOOD.  Stop if you experience nausea, reflux, or stomach pain.    WOUND CARE:  Keep the dressing clean and dry.  Light drainage may occur the first 2 days postop.  You may remove the dressings and get the incision wet in the shower 72 hours after surgery.  Do not remove steri-strips or sutures.  Do not immerse the incision under water.  Carefully pat the incision dry.  If there is wound drainage, re-apply a fresh dry gauze dressing.  Please call our office ([phone]) if you experience either of the following:  Sudden increase in swelling, redness, or warmth at the surgical site  Excessive incisional drainage that persists beyond the 3rd day after surgery  Oral temperature greater than 101 degrees, not relieved with Tylenol  Shortness of breath, chest pain, nausea, or any other concerning symptoms    SWELLING CONTROL:  Cold Therapy:  The cold therapy device may be used either continuously or only as needed, according to your preference.  Do not let the pad directly touch your skin.  Alternatively, apply ice (20 min on, 20 min off) as often as you feel is necessary.    SLING:  Wear your sling at all times (including sleep) until your  first postoperative office visit.  You may remove the sling for showering but must keep your arm at your side.    ACTIVITY:   Do not lift, carry, push, or pull anything with your operative arm.  You are not allowed range of motion of your shoulder until you are given permission from your surgeon. You may do gentle range of motion of the elbow, wrist, and fingers.  Place a pillow behind the elbow while lying down.  Sleeping in a more upright position (recliner) may be more comfortable initially.  Wrist / Finger Motion:  With the sling on, move your wrist and fingers through a full range of motion 20 times per hour while awake.    PHYSICAL THERAPY:  You may begin physical therapy after you are seen in the office for your postoperative appointment. We will place a referral to physical therapy after the procedure so you can call in advance to get an appointment set up after the first postoperative appointment.    FOLLOW-UP APPOINTMENT:  10-14 days after surgery with:    Dr. Gene Morrell MD  [facility]  [address] [place], [address] ([facility])  [address] [place], [address] [facility])  [phone]

## 2024-05-08 NOTE — ANESTHESIA POSTPROCEDURE EVALUATION
Post-Op Assessment Note    CV Status:  Stable    Pain management: adequate       Mental Status:  Alert and awake   Hydration Status:  Euvolemic   PONV Controlled:  Controlled   Airway Patency:  Patent     Post Op Vitals Reviewed: Yes    No anethesia notable event occurred.    Staff: SHARATH               /82 (05/08/24 0914)    Temp (!) 96.9 °F (36.1 °C) (05/08/24 0914)    Pulse 71 (05/08/24 0914)   Resp 18 (05/08/24 0914)    SpO2 98 % (05/08/24 0914)

## 2024-05-08 NOTE — ANESTHESIA PROCEDURE NOTES
Peripheral Block    Patient location during procedure: holding area  Start time: 5/8/2024 6:50 AM  Reason for block: at surgeon's request and post-op pain management  Staffing  Performed by: Rishabh Carpenter MD  Authorized by: Rishabh Carpenter MD    Preanesthetic Checklist  Completed: patient identified, IV checked, site marked, risks and benefits discussed, surgical consent, monitors and equipment checked, pre-op evaluation and timeout performed  Peripheral Block  Patient position: sitting  Prep: ChloraPrep  Patient monitoring: frequent blood pressure checks, continuous pulse oximetry and heart rate  Block type: Interscalene  Laterality: right  Injection technique: single-shot  Procedures: ultrasound guided, Ultrasound guidance required for the procedure to increase accuracy and safety of medication placement and decrease risk of complications.  Ultrasound permanent image saved  bupivacaine (PF) (MARCAINE) 0.5 % injection 20 mL - Perineural   5 mL - 5/8/2024 6:57:00 AM  bupivacaine liposomal (EXPAREL) 1.3 % injection 20 mL - Perineural   20 mL - 5/8/2024 6:57:00 AM  Needle  Needle type: Stimuplex   Needle gauge: 20 G  Needle length: 4 in  Needle localization: anatomical landmarks and ultrasound guidance  Needle insertion depth: 4 cm  Assessment  Injection assessment: incremental injection, frequent aspiration, injected with ease, negative aspiration, negative for heart rate change, no paresthesia on injection, no symptoms of intraneural/intravenous injection and needle tip visualized at all times  Paresthesia pain: none  Post-procedure:  site cleaned  patient tolerated the procedure well with no immediate complications

## 2024-05-08 NOTE — ANESTHESIA PREPROCEDURE EVALUATION
Procedure:  REPAIR ROTATOR CUFF  ARTHROSCOPIC (Right: Shoulder)  ARTHROSCOPIC BICEPS TENODESIS (Right: Shoulder)  ACROMIOPLASTY (Right: Shoulder)    Relevant Problems   ANESTHESIA (within normal limits)      CARDIO   (+) Hyperlipidemia      ENDO   (+) Secondary hypothyroidism   (+) Type 2 diabetes mellitus without complication, without long-term current use of insulin (HCC)      GI/HEPATIC   (+) GERD (gastroesophageal reflux disease)   (+) Gastroesophageal reflux disease without esophagitis      /RENAL (within normal limits)      GYN (within normal limits)      HEMATOLOGY (within normal limits)      MUSCULOSKELETAL (within normal limits)      NEURO/PSYCH   (+) Chronic left shoulder pain      PULMONARY (within normal limits)        Physical Exam    Airway    Mallampati score: II  TM Distance: >3 FB  Neck ROM: full     Dental   No notable dental hx     Cardiovascular  Rhythm: regular, Rate: normal, Cardiovascular exam normal    Pulmonary  Pulmonary exam normal Breath sounds clear to auscultation    Other Findings        Anesthesia Plan  ASA Score- 2     Anesthesia Type- general with ASA Monitors.         Additional Monitors:     Airway Plan: ETT.    Comment: Patient seen and examined.  History reviewed.  Patient to be done under general anesthesia with GETA and routine monitors.  IS block with Exparel to be performed preoperatively for post-op pain.  Risks discussed with the patient. Consent obtained..       Plan Factors-Exercise tolerance (METS): >4 METS.    Chart reviewed. EKG reviewed.  Existing labs reviewed. Patient summary reviewed.    Patient is not a current smoker.  Patient did not smoke on day of surgery.    Obstructive sleep apnea risk education given perioperatively.        Induction- intravenous.    Postoperative Plan- Plan for postoperative opioid use. Planned trial extubation    Informed Consent- Anesthetic plan and risks discussed with patient.  I personally reviewed this patient with the CRNA.  Discussed and agreed on the Anesthesia Plan with the CRNA..

## 2024-05-08 NOTE — INTERVAL H&P NOTE
H&P reviewed. After examining the patient I find no changes in the patients condition since the H&P had been written.    Vitals:    05/08/24 0655   BP:    Pulse: 67   Resp: 19   Temp:    SpO2: 96%

## 2024-05-10 NOTE — OP NOTE
OPERATIVE REPORT  PATIENT NAME: Solomon Meyers    :  1955  MRN: [id number]  Pt Location: [facility] OR ROOM 06    SURGERY DATE: 2024    Surgeons and Role:     * Gene Morrell MD - Primary     * ALEXANDER Werner-C - Assisting    Preop Diagnosis:  Traumatic complete tear of right rotator cuff, initial encounter [S46.011A]    Post-Op Diagnosis Codes:     * Traumatic complete tear of right rotator cuff, initial encounter [S46.011A]    Procedure(s):  Right - REPAIR ROTATOR CUFF  ARTHROSCOPIC  Right - ARTHROSCOPIC BICEPS TENODESIS  Right - ACROMIOPLASTY    Specimen(s):  * No specimens in log *    Estimated Blood Loss:   Minimal    Drains:  * No LDAs found *    Anesthesia Type:   General w/ Regional    Operative Indications:  Traumatic complete tear of right rotator cuff, initial encounter [S46.011A]      Operative Findings:  Massive rotator cuff tear of the suprispinatus and infraspinatus  Tear/tendonitis of the biceps tendon  Impingment syndrome     Complications:   None    Procedure and Technique:  NAME OF OPERATION:    Right Shoulder arthroscopic rotator cuff repair of the supraspinatus and infraspinatus.   Right Shoulder arthroscopic subacromial decompression, acromioplasty.   Right Biceps tenodesis, arthroscopic    ASSISTANT: I requested ALEXANDER Mckenzie to assist with this procedure. Assistance was medically necessary in order to safely perform the procedure without increased blood loss or morbidity. Assistance was provided through positioning, instrumentation, wound closure, and instillation of anesthetic, application of sterile dressing, and safe transport from the operative suite.    There was no qualified resident available to assist    INDICATIONS:  Solomon Meyers is a 69 y.o. who had injured his shoulder, followed by pain and dysfunction. MRI was consistent with full thickness rotator cuff tear. he had failed conservative measures and was therefore indicated for surgical repair.  he understood the  risks, benefits and alternatives to the procedure, and elected to proceed.    The patient was identified in the preoperative holding area and the correct operative site was signed. The patient was then brought into the Operating Room and Anesthesia then successfully introduced a regional block without complications. The patient was then positioned in a beach chair position. The operative shoulder and upper extremity were prepped and draped in the usual sterile fashion. Prior to making incision, a time-out occurred at which time all members of the surgical team confirmed the patient identity, laterality and correct operation against the informed written consent. It was also confirmed that the patient received preoperative antibiotics.    A posterior portal was established. The arthroscope was inserted into the glenohumeral joint. An anterior portal was established in the rotator interval. Diagnostic arthroscopy then took place. The articular surfaces of the glenoid and humeral head were within normal limits. The labrum circumferentially had some mild fraying that was gently debrided with an arthroscopic shaver. The labral attachment to the bone was solid, therefore, no labral repair was warranted. The long head of the biceps tendon was torn and tendonotic. It was released at the bicipital anchor and using the loop and tack technique it was tenodesed to the bicipital groove.  The supraspinatus and infraspinatus tendons had a full thickness tear with moderate retraction. The subscapularis and teres minor tendons were within normal limits.    The arthroscope was inserted into the subacromial space. There was an impingement lesion of the CA ligament. The CA ligament was removed, revealing a Type II acromial spur. A 5.5 shaver was used to kelsi this down such that it was flush with the remainder of the acromion. An extensive bursectomy then took place; the bursa was hyperemic as was the rotator cuff. This revealed a  crescent shaped, massive 4 cm x 3 cm tear in the supraspinatus tendon with minimal retraction and good quality tendon. The greater tuberosity footprint was then abraded down to bleeding bone. Two knotless anchors were placed at the articular margin. A scorpion was then used to pass the sutures through the tendon. Two additional luggage tag sutures were placed at the anterior and posterior limbs to reduce dog ear formation of the massive tear.     The medial row was then placed through two knotless anchors; these were placed in the posterolateral and anterolateral aspect of the proximal humerus, bringing the leading edges of the cuff down to the footprint. This provided an excellent transosseous-equivalent repair of the rotator cuff, and reduced the rotator cuff back to its anatomic position. The shoulder was internally and externally rotated and abducted, and the rotator cuff repair moved as a single unit demonstrating adequate rotator cuff repair.    At this point, all arthroscopic instruments were removed. The portal sites were closed with 3-0 nylon. Dry sterile dressing was applied. Patient was placed in a shoulder immobilizer and brought to the recovery room in stable condition.    At the conclusion of the case, the patient tolerated the procedure well. No complications. All sponge and instrument counts were correct.        Post-Operative Rehabilitation Guidelines for Standard Rotator Cuff Tears    1-4 Weeks:   Sling Immobilization  Active ROM Elbow, Wrist and Hand  True Passive (ONLY) ROM Shoulder. NO ACTIVE MOTION.   Pendulums, Supine Elevation in Scapular plane, External Rotation to tolerance with arm at side. (emphasize ER, minimum goal 40°)  Scapular Stabilization exercises (sidelying)  Deltoid isometrics in neutral (submaximal) as ROM improves  No Pulley/Canes until 6 weeks post-op (these are active motions)    4-6 Weeks:   Discontinue sling use.  Begin Active Assist ROM and advance to Active as  Tolerated  Elevation in scapular plane and external rotation as tolerated  No Internal rotation or behind back until 6wks.  Begin Cuff Isometrics at 6wks with arm at the side    6-12 Weeks:   Active Assist to Active ROM Shoulder As Tolerated  Elevation in scapular plane and external rotation to tolerance  Begin internal rotation as tolerated  Light stretching at end ranges  Cuff Isometrics with the arm at the side    3-12 Months:  Advance to full ROM as tolerated with passive stretching at end ranges  Advance strengthening as tolerated: isometrics ? bands ? light weights (1-5   lbs); 8-12 reps/2-3 sets per rotator cuff, deltoid, and scapular stabilizers  Limit strengthening 3x/week to avoid rotator cuff tendonitis  Begin eccentrically resisted motions, pylometrics (ex. Weighted ball toss),  proprioception (es. body blade)  Begin sports related rehab at 4 ½ months, including advanced conditioning  Return to throwing at 6 months  Throw from pitcher's mound at 9 months  Collision sports at 9 months  MMI is usually at 12 months post-op      I was present for the entire procedure.    Patient Disposition:  PACU         SIGNATURE: Gene Morrell MD  DATE: May 10, 2024  TIME: 7:31 AM

## 2024-05-21 ENCOUNTER — OFFICE VISIT (OUTPATIENT)
Dept: OBGYN CLINIC | Facility: CLINIC | Age: 69
End: 2024-05-21

## 2024-05-21 VITALS
HEART RATE: 61 BPM | WEIGHT: 171 LBS | HEIGHT: 68 IN | SYSTOLIC BLOOD PRESSURE: 139 MMHG | DIASTOLIC BLOOD PRESSURE: 86 MMHG | BODY MASS INDEX: 25.91 KG/M2

## 2024-05-21 DIAGNOSIS — Z98.890 STATUS POST RIGHT ROTATOR CUFF REPAIR: Primary | ICD-10-CM

## 2024-05-21 PROCEDURE — 99024 POSTOP FOLLOW-UP VISIT: CPT | Performed by: ORTHOPAEDIC SURGERY

## 2024-05-21 NOTE — PROGRESS NOTES
Subjective   CHIEF COMPLAINT/REASON FOR VISIT  Solomon Meyers is a 69 y.o. male who presents for 2 week post op follow-up after Repair Rotator Cuff  Arthroscopic - Right, Arthroscopic Biceps Tenodesis - Right, and Acromioplasty - Right on 5/8/2024     HISTORY OF PRESENT ILLNESS  Overall, he feels things are going well and progressing appropriately. Currently, he is doing well without any specific concerns.  He has been wearing the sling.  He has not been set up with PT yet.    Objective   PHYSICAL EXAMINATION  Right Shoulder  Surgical incisions well healed. Sutures removed.  ROM of shoulder not tested  ROM of elbow, wrist, and hand intact  Fingers warm and perfused  NVID     Assessment & Plan   1. Status Post Repair Rotator Cuff  Arthroscopic - Right, Arthroscopic Biceps Tenodesis - Right, and Acromioplasty - Right    He is doing well after surgery and making appropriate progress. Recommended staying in the sling for the next 2-4 weeks and the weaning out of it. Encouraged to get set up with physical therapy to begin rotator cuff rehabilitation protocol.  PT order placed.    We will plan to see him back at the 3 month post-operative [place]. If any issues, questions, or concerns arise between now and the next appointment, we have encouraged the patient contact our team.    Scribe Attestation      I,:  Kavon Geiger PA-C am acting as a scribe while in the presence of the attending physician.:       I,:  Gene Morrell MD personally performed the services described in this documentation    as scribed in my presence.:

## 2024-05-24 ENCOUNTER — TELEPHONE (OUTPATIENT)
Age: 69
End: 2024-05-24

## 2024-05-24 NOTE — PROGRESS NOTES
PT Evaluation     Today's date: 2024  Patient name: Solomon Meyers  : 1955  MRN: [id number]  Referring provider: Enmanuel Tyson PA*  Dx:   Encounter Diagnosis     ICD-10-CM    1. S/P right rotator cuff repair  Z98.890                      Assessment    Assessment details: Pt is a 69 y.o. male who presents to OP PT for IE following R RTCr, biceps tenodesis, and acromioplasty on 24. Pt doing well with minimal pain reported. Upon formal assessment pt demonstrates good PROM, all motions to protocol limits. Incisions healing well. Steri-strips still intact over one surgical, all others have fallen off. Given these findings pt is recommended for skilled PT intervention 2x wk to improve level of function, to increase overall quality of life, and to address aforementioned impairments. Pt agreeable to POC. HEP given and completed in clinic. Handout provided. Can progress HEP NV per protocol.     Pt educated in post-op protocol including sling wear, PROM limits, and no active motion at [facility]. Verbalized understanding.     Understanding of Dx/Px/POC: good     Prognosis: good    Goals  STGs (to be achieved within 2-4 weeks)  1. Pt will report no >2/10 pain w/ all activities to indicate a significant improvement in activity tolerance and pain.  2. Pt will be educated in and demonstrate good understanding of post-op protocol and only engage in activities appropriate for current phase while outside of PT.     LTGs (to be achieved within 6-8 weeks)   1. Pt will be I with HEP at d/c to promote PT carry-over.   2. Pt will meet FOTO predicted score.   3. Pt will improve R shoulder ROM to WNL to increase ease w/ functional mobility.   4. Pt will improve RUE strength to 4+/5 or greater to increase ease w/ functional mobility.      Plan  Planned modality interventions: unattended electrical stimulation, ultrasound, traction, thermotherapy: hydrocollator packs, low level laser therapy and cryotherapy    Planned therapy  interventions: manual therapy, neuromuscular re-education, therapeutic activities, therapeutic exercise and gait training    Frequency: 2x week  Duration in weeks: 8  Plan of Care beginning date: 5/28/2024  Plan of Care expiration date: 7/26/2024  Treatment plan discussed with: patient        Subjective Evaluation    History of Present Illness  Mechanism of injury: On 5/8/24 pt underwent routine R RTCr (supraspinatus & infraspinatus), biceps tenodesis, & acromioplasty w/o complications, pt was d/c'ed home same day. Since surgery pt has been doing well. Occasional numbness reported depending on position but overall very minimal pain. Has not needed to take rx pain medications; uses OTC occasionally. Prior to surgery pt failed course of conservative PT treatment at [facility]. [place] in [place]; returns to [place] for several months each year.     Goals include being able to raise arms overhead and be able to lift arm out the side away from body.               Objective     Active Range of Motion   Cervical/Thoracic Spine     Normal active range of motion  Left Shoulder   Normal active range of motion    Left Elbow   Normal active range of motion    Right Elbow   Normal active range of motion    Passive Range of Motion     Right Shoulder   Flexion: 90 degrees   Abduction: 80 degrees   External rotation 0°: 40 degrees   Internal rotation 0°: WFL    Additional Passive Range of Motion Details  All PROM to protocol limits    Strength/Myotome Testing     Left Shoulder   Normal muscle strength    Additional Strength Details  R NT per protocol             Precautions: GERD, pituitary mass     DOS: 5/8/24 R RTCr (supraspinatus & infraspinatus), biceps tenodesis, acromioplasty   Next Protocol progression at Week 6: 6/19/24     * indicates included in HEP:     Access Code: [email or web address]  URL: [email or web address][email or web address]  Date: 05/28/2024  Prepared by: Mariely  "Ivanpold    Manuals 5/28            Shoulder PROM (per protocol) VR                                                   Neuro Re-Ed             Scap set 2x10 5\"                                                                                          Ther Ex             Pt education POC, HEP, post-op protocol D/c sling at 4 weeks           Cervical AROM             Forearm supination ROM* 10x10\"            Elbow extension stretch* 10x10\"            Pendulums (flex/abd/CW/CCW) X2min ea            Table slides  NV            Pulleys  NV            Cane exercises NV                         Ther Activity             TM 5' (will return to at gym)                         Gait Training                                       Modalities             Ice                               "

## 2024-05-24 NOTE — TELEPHONE ENCOUNTER
Pt. Called to let us know that Dr. Lord called him twice.  Was leaving message for someone named Yvonne but this patients name is Solomon Meyers. He just called to let us know that we are reaching wrong person.  Thank you!!

## 2024-05-28 ENCOUNTER — OFFICE VISIT (OUTPATIENT)
Dept: PHYSICAL THERAPY | Facility: CLINIC | Age: 69
End: 2024-05-28
Payer: COMMERCIAL

## 2024-05-28 DIAGNOSIS — Z98.890 STATUS POST RIGHT ROTATOR CUFF REPAIR: ICD-10-CM

## 2024-05-28 DIAGNOSIS — Z98.890 S/P RIGHT ROTATOR CUFF REPAIR: Primary | ICD-10-CM

## 2024-05-28 PROCEDURE — 97161 PT EVAL LOW COMPLEX 20 MIN: CPT

## 2024-05-28 PROCEDURE — 97110 THERAPEUTIC EXERCISES: CPT

## 2024-05-31 NOTE — PROGRESS NOTES
Daily Note     Today's date: 6/3/2024  Patient name: Solomon Meyers  : 1955  MRN: [id number]  Referring provider: Enmanuel Tyson PA*  Dx:   Encounter Diagnosis     ICD-10-CM    1. S/P right rotator cuff repair  Z98.890       2. Status post right rotator cuff repair  Z98.890                      Subjective: Pt reports his shoulder is doing really well.       Objective: See treatment diary below      Assessment: Flexion PROM to 90 deg. Abduction to 50 deg. ER to 35-40 deg. Good scap retraction. Progress to AAROM nv, but no pulleys or canes until 6 weeks.       Plan: Continue per plan of care.  Progress treatment as tolerated.       Precautions: GERD, pituitary mass     DOS: 24 R RTCr (supraspinatus & infraspinatus), biceps tenodesis, acromioplasty   Next Protocol progression at Week 6: 24     Post-Operative Rehabilitation Guidelines for Standard Rotator Cuff Tears     1-4 Weeks:   Sling Immobilization   Active ROM Elbow, Wrist and Hand   True Passive (ONLY) ROM Shoulder. NO ACTIVE MOTION.   Pendulums, Supine Elevation in Scapular plane, External Rotation to tolerance with arm at side. (emphasize ER, minimum goal 40°)   Scapular Stabilization exercises (sidelying)   Deltoid isometrics in neutral (submaximal) as ROM improves   No Pulley/Canes until 6 weeks post-op (these are active motions)     4-6 Weeks: -  Discontinue sling use.   Begin Active Assist ROM and advance to Active as Tolerated   Elevation in scapular plane and external rotation as tolerated   No Internal rotation or behind back until 6wks.   Begin Cuff Isometrics at 6wks with arm at the side     6-12 Weeks:   Active Assist to Active ROM Shoulder As Tolerated   Elevation in scapular plane and external rotation to tolerance   Begin internal rotation as tolerated   Light stretching at end ranges   Cuff Isometrics with the arm at the side     3-12 Months:   Advance to full ROM as tolerated with passive stretching at end ranges  "  Advance strengthening as tolerated: isometrics ? bands ? light weights (1-5    lbs); 8-12 reps/2-3 sets per rotator cuff, deltoid, and scapular stabilizers   Limit strengthening 3x/week to avoid rotator cuff tendonitis   Begin eccentrically resisted motions, pylometrics (ex. Weighted ball toss),   proprioception (es. body blade)   Begin sports related rehab at 4 ½ months, including advanced conditioning   Return to throwing at 6 months   Throw from pitcher'Meileleund at 9 months   Collision sports at 9 months   MMI is usually at 12 months post-op     Access Code: [email or web address]  URL: [email or web address]  Date: 05/28/2024  Prepared by: Mariely Serrano    Manuals 5/28 6/3           Shoulder PROM (per protocol) VR 10'                                                  Neuro Re-Ed             Scap retraction 2x10 5\" 2x10           Deltoid isometrics  10x5\"                                                                            Ther Ex             Cervical AROM             Elbow flexion AROM  1x30           Digiflex  Blue 1x30           Pendulums (flex/abd/CW/CCW) x2min ea x30 ea           Flexion table slides                                                    Ther Activity                                       Gait Training                                       Modalities             Ice                               "

## 2024-06-03 ENCOUNTER — EVALUATION (OUTPATIENT)
Dept: PHYSICAL THERAPY | Facility: CLINIC | Age: 69
End: 2024-06-03
Payer: COMMERCIAL

## 2024-06-03 DIAGNOSIS — Z98.890 S/P RIGHT ROTATOR CUFF REPAIR: Primary | ICD-10-CM

## 2024-06-03 DIAGNOSIS — Z98.890 STATUS POST RIGHT ROTATOR CUFF REPAIR: ICD-10-CM

## 2024-06-03 PROCEDURE — 97110 THERAPEUTIC EXERCISES: CPT

## 2024-06-03 PROCEDURE — 97140 MANUAL THERAPY 1/> REGIONS: CPT

## 2024-06-06 ENCOUNTER — OFFICE VISIT (OUTPATIENT)
Dept: PHYSICAL THERAPY | Facility: CLINIC | Age: 69
End: 2024-06-06
Payer: COMMERCIAL

## 2024-06-06 DIAGNOSIS — Z98.890 STATUS POST RIGHT ROTATOR CUFF REPAIR: ICD-10-CM

## 2024-06-06 DIAGNOSIS — Z98.890 S/P RIGHT ROTATOR CUFF REPAIR: Primary | ICD-10-CM

## 2024-06-06 PROCEDURE — 97110 THERAPEUTIC EXERCISES: CPT

## 2024-06-06 PROCEDURE — 97140 MANUAL THERAPY 1/> REGIONS: CPT

## 2024-06-06 NOTE — PROGRESS NOTES
Daily Note     Today's date: 2024  Patient name: Solomon Meyers  : 1955  MRN: [id number]  Referring provider: Enmanuel Tyson PA*  Dx:   Encounter Diagnosis     ICD-10-CM    1. S/P right rotator cuff repair  Z98.890       2. Status post right rotator cuff repair  Z98.890                      Subjective: Pt reports no shoulder pain.      Objective: See treatment diary below; Added table slides; R shoulder PROM: flexion: 90 deg, abduction: 60 deg ,ER: 30 deg      Assessment: Pt demonstrated full PROM within protocol restrictions.       Plan: Continue per plan of care.  Progress treatment as tolerated.       Precautions: GERD, pituitary mass     DOS: 24 R RTCr (supraspinatus & infraspinatus), biceps tenodesis, acromioplasty   Next Protocol progression at Week 6: 24 -  no pulleys or canes until 6 weeks.     Post-Operative Rehabilitation Guidelines for Standard Rotator Cuff Tears     1-4 Weeks:   Sling Immobilization   Active ROM Elbow, Wrist and Hand   True Passive (ONLY) ROM Shoulder. NO ACTIVE MOTION.   Pendulums, Supine Elevation in Scapular plane, External Rotation to tolerance with arm at side. (emphasize ER, minimum goal 40°)   Scapular Stabilization exercises (sidelying)   Deltoid isometrics in neutral (submaximal) as ROM improves   No Pulley/Canes until 6 weeks post-op (these are active motions)     4-6 Weeks: -  Discontinue sling use.   Begin Active Assist ROM and advance to Active as Tolerated   Elevation in scapular plane and external rotation as tolerated   No Internal rotation or behind back until 6wks.   Begin Cuff Isometrics at 6wks with arm at the side     6-12 Weeks:   Active Assist to Active ROM Shoulder As Tolerated   Elevation in scapular plane and external rotation to tolerance   Begin internal rotation as tolerated   Light stretching at end ranges   Cuff Isometrics with the arm at the side     3-12 Months:   Advance to full ROM as tolerated with passive stretching at  "end ranges   Advance strengthening as tolerated: isometrics ? bands ? light weights (1-5    lbs); 8-12 reps/2-3 sets per rotator cuff, deltoid, and scapular stabilizers   Limit strengthening 3x/week to avoid rotator cuff tendonitis   Begin eccentrically resisted motions, pylometrics (ex. Weighted ball toss),   proprioception (es. body blade)   Begin sports related rehab at 4 ½ months, including advanced conditioning   Return to throwing at 6 months   Throw from pitcher'Doctor kineticund at 9 months   Collision sports at 9 months   MMI is usually at 12 months post-op     Access Code: [email or web address]  URL: [email or web address]  Date: 05/28/2024  Prepared by: Mariely Serrano    Manuals 5/28 6/3 6/6          Shoulder PROM (per protocol) VR 10' 10'                                                 Neuro Re-Ed             Scap retraction 2x10 5\" 2x10 2x10          Deltoid isometrics  10x5\"                                                                            Ther Ex             Cervical AROM             Elbow flexion AROM  1x30 1x30          Digiflex  Blue 1x30 Blue  1x30          Pendulums (flex/abd/CW/CCW) x2min ea x30 ea X30 ea          Flexion table slides   1x15                                                 Ther Activity                                       Gait Training                                       Modalities             Ice                               "

## 2024-06-11 ENCOUNTER — OFFICE VISIT (OUTPATIENT)
Dept: PHYSICAL THERAPY | Facility: CLINIC | Age: 69
End: 2024-06-11
Payer: COMMERCIAL

## 2024-06-11 DIAGNOSIS — Z98.890 STATUS POST RIGHT ROTATOR CUFF REPAIR: ICD-10-CM

## 2024-06-11 DIAGNOSIS — Z98.890 S/P RIGHT ROTATOR CUFF REPAIR: Primary | ICD-10-CM

## 2024-06-11 PROCEDURE — 97140 MANUAL THERAPY 1/> REGIONS: CPT | Performed by: PHYSICAL THERAPIST

## 2024-06-11 PROCEDURE — 97110 THERAPEUTIC EXERCISES: CPT | Performed by: PHYSICAL THERAPIST

## 2024-06-11 NOTE — PROGRESS NOTES
Daily Note     Today's date: 2024  Patient name: Solomon Meyers  : 1955  MRN: [id number]  Referring provider: Enmanuel Tyson PA*  Dx:   Encounter Diagnosis     ICD-10-CM    1. S/P right rotator cuff repair  Z98.890       2. Status post right rotator cuff repair  Z98.890                        Subjective: Pt denies any shoulder pain and notes steady improvement in pain.       Objective: See treatment diary below. R shoulder PROM: flexion: 90 deg, abduction: 60 deg ,ER: 30 deg      Assessment: Patient exhibits full PROM within protocol restrictions. No pain or difficulty with current program.       Plan: Continue per plan of care.  Progress treatment as tolerated.       Precautions: GERD, pituitary mass     DOS: 24 R RTCr (supraspinatus & infraspinatus), biceps tenodesis, acromioplasty   Next Protocol progression at Week 6: 24 -  no pulleys or canes until 6 weeks.     Post-Operative Rehabilitation Guidelines for Standard Rotator Cuff Tears     1-4 Weeks:   Sling Immobilization   Active ROM Elbow, Wrist and Hand   True Passive (ONLY) ROM Shoulder. NO ACTIVE MOTION.   Pendulums, Supine Elevation in Scapular plane, External Rotation to tolerance with arm at side. (emphasize ER, minimum goal 40°)   Scapular Stabilization exercises (sidelying)   Deltoid isometrics in neutral (submaximal) as ROM improves   No Pulley/Canes until 6 weeks post-op (these are active motions)     4-6 Weeks: -  Discontinue sling use.   Begin Active Assist ROM and advance to Active as Tolerated   Elevation in scapular plane and external rotation as tolerated   No Internal rotation or behind back until 6wks.   Begin Cuff Isometrics at 6wks with arm at the side     6-12 Weeks:   Active Assist to Active ROM Shoulder As Tolerated   Elevation in scapular plane and external rotation to tolerance   Begin internal rotation as tolerated   Light stretching at end ranges   Cuff Isometrics with the arm at the side     3-12  "Months:   Advance to full ROM as tolerated with passive stretching at end ranges   Advance strengthening as tolerated: isometrics ? bands ? light weights (1-5    lbs); 8-12 reps/2-3 sets per rotator cuff, deltoid, and scapular stabilizers   Limit strengthening 3x/week to avoid rotator cuff tendonitis   Begin eccentrically resisted motions, pylometrics (ex. Weighted ball toss),   proprioception (es. body blade)   Begin sports related rehab at 4 ½ months, including advanced conditioning   Return to throwing at 6 months   Throw from pitcher'[place]ur[employer] at 9 months   Collision sports at 9 months   MMI is usually at 12 months post-op     Access Code: [email or web address]  URL: [email or web address]  Date: 05/28/2024  Prepared by: Mariely Serrano    Manuals 5/28 6/3 6/6 6/10         Shoulder PROM (per protocol) VR 10' 10' 10'                                                Neuro Re-Ed             Scap retraction 2x10 5\" 2x10 2x10 2x10         Deltoid isometrics  10x5\"  15x5\"                                                                          Ther Ex             Cervical AROM             Elbow flexion AROM  1x30 1x30 1x30         Digiflex  Blue 1x30 Blue  1x30 Blue 1x30         Pendulums (flex/abd/CW/CCW) x2min ea x30 ea X30 ea X30 ea         Flexion table slides   1x15 1x15                                                Ther Activity                                       Gait Training                                       Modalities             Ice                               "

## 2024-06-13 ENCOUNTER — OFFICE VISIT (OUTPATIENT)
Dept: PHYSICAL THERAPY | Facility: CLINIC | Age: 69
End: 2024-06-13
Payer: COMMERCIAL

## 2024-06-13 DIAGNOSIS — Z98.890 STATUS POST RIGHT ROTATOR CUFF REPAIR: ICD-10-CM

## 2024-06-13 DIAGNOSIS — Z98.890 S/P RIGHT ROTATOR CUFF REPAIR: Primary | ICD-10-CM

## 2024-06-13 PROCEDURE — 97140 MANUAL THERAPY 1/> REGIONS: CPT

## 2024-06-13 PROCEDURE — 97112 NEUROMUSCULAR REEDUCATION: CPT

## 2024-06-13 PROCEDURE — 97110 THERAPEUTIC EXERCISES: CPT

## 2024-06-13 NOTE — PROGRESS NOTES
Daily Note     Today's date: 2024  Patient name: Solomon Meyers  : 1955  MRN: [id number]  Referring provider: Enmanuel Tyson PA*  Dx:   Encounter Diagnosis     ICD-10-CM    1. S/P right rotator cuff repair  Z98.890       2. Status post right rotator cuff repair  Z98.890                        Subjective: Pt reports no change in condition since last visit.       Objective: See treatment diary below. R shoulder PROM: flexion: 90 deg, abduction: 60 deg ,ER: 45 deg      Assessment: UT compensation noted with table slides. Good correction with cueing, but did have slightly less flexion range of motion then. ER to 45 deg today. Good improvement. Continue to progress as able.       Plan: Continue per plan of care.  Progress treatment as tolerated.       Precautions: GERD, pituitary mass     DOS: 24 R RTCr (supraspinatus & infraspinatus), biceps tenodesis, acromioplasty   Next Protocol progression at Week 6: 24 -  no pulleys or canes until 6 weeks.     Post-Operative Rehabilitation Guidelines for Standard Rotator Cuff Tears     1-4 Weeks:   Sling Immobilization   Active ROM Elbow, Wrist and Hand   True Passive (ONLY) ROM Shoulder. NO ACTIVE MOTION.   Pendulums, Supine Elevation in Scapular plane, External Rotation to tolerance with arm at side. (emphasize ER, minimum goal 40°)   Scapular Stabilization exercises (sidelying)   Deltoid isometrics in neutral (submaximal) as ROM improves   No Pulley/Canes until 6 weeks post-op (these are active motions)     4-6 Weeks: -  Discontinue sling use.   Begin Active Assist ROM and advance to Active as Tolerated   Elevation in scapular plane and external rotation as tolerated   No Internal rotation or behind back until 6wks.   Begin Cuff Isometrics at 6wks with arm at the side     6-12 Weeks:   Active Assist to Active ROM Shoulder As Tolerated   Elevation in scapular plane and external rotation to tolerance   Begin internal rotation as tolerated   Light  "stretching at end ranges   Cuff Isometrics with the arm at the side     3-12 Months:   Advance to full ROM as tolerated with passive stretching at end ranges   Advance strengthening as tolerated: isometrics ? bands ? light weights (1-5    lbs); 8-12 reps/2-3 sets per rotator cuff, deltoid, and scapular stabilizers   Limit strengthening 3x/week to avoid rotator cuff tendonitis   Begin eccentrically resisted motions, pylometrics (ex. Weighted ball toss),   proprioception (es. body blade)   Begin sports related rehab at 4 ½ months, including advanced conditioning   Return to throwing at 6 months   Throw from pitcher'Clusterize at 9 months   Collision sports at 9 months   MMI is usually at 12 months post-op     Access Code: [email or web address]  URL: [email or web address]  Date: 05/28/2024  Prepared by: Mariely Serrano    Manuals 5/28 6/3 6/6 6/10 6/13        Shoulder PROM (per protocol) VR 10' 10' 10' 15'                                               Neuro Re-Ed             Scap retraction 2x10 5\" 2x10 2x10 2x10 3x10        Deltoid isometrics  10x5\"  10x5\" 10x5\"                                                                         Ther Ex             Cervical AROM             Elbow flexion AROM  1x30 1x30 1x30 1x30        Digiflex  Blue 1x30 Blue  1x30 Blue 1x30         Pendulums (flex/abd/CW/CCW) x2min ea x30 ea X30 ea X30 ea 1x10 ea        Flexion table slides   1x15 1x15 2x10                                               Ther Activity                                       Gait Training                                       Modalities             Ice                               "

## 2024-06-18 ENCOUNTER — OFFICE VISIT (OUTPATIENT)
Dept: PHYSICAL THERAPY | Facility: CLINIC | Age: 69
End: 2024-06-18
Payer: COMMERCIAL

## 2024-06-18 DIAGNOSIS — Z98.890 S/P RIGHT ROTATOR CUFF REPAIR: Primary | ICD-10-CM

## 2024-06-18 DIAGNOSIS — Z98.890 STATUS POST RIGHT ROTATOR CUFF REPAIR: ICD-10-CM

## 2024-06-18 PROCEDURE — 97110 THERAPEUTIC EXERCISES: CPT

## 2024-06-18 PROCEDURE — 97140 MANUAL THERAPY 1/> REGIONS: CPT

## 2024-06-18 NOTE — PROGRESS NOTES
Daily Note     Today's date: 2024  Patient name: Solomon Meyers  : 1955  MRN: [id number]  Referring provider: Enmanuel Tyson PA*  Dx:   Encounter Diagnosis     ICD-10-CM    1. S/P right rotator cuff repair  Z98.890       2. Status post right rotator cuff repair  Z98.890                          Subjective: Pt reports his shoulder continues to improve each day.       Objective: See treatment diary below. R shoulder PROM: flexion: 90 deg, abduction: 60 deg ,ER: 45 deg      Assessment: Progressed to cane and pulley AAROM as tomorrow is 6 weeks from date of surgery. Pt tolerated very well. Updated HEP provided.       Plan: Continue per plan of care.  Progress treatment as tolerated.       Precautions: GERD, pituitary mass     DOS: 24 R RTCr (supraspinatus & infraspinatus), biceps tenodesis, acromioplasty   Next Protocol progression at Week 6: 24 -  no pulleys or canes until 6 weeks.     Post-Operative Rehabilitation Guidelines for Standard Rotator Cuff Tears     1-4 Weeks:   Sling Immobilization   Active ROM Elbow, Wrist and Hand   True Passive (ONLY) ROM Shoulder. NO ACTIVE MOTION.   Pendulums, Supine Elevation in Scapular plane, External Rotation to tolerance with arm at side. (emphasize ER, minimum goal 40°)   Scapular Stabilization exercises (sidelying)   Deltoid isometrics in neutral (submaximal) as ROM improves   No Pulley/Canes until 6 weeks post-op (these are active motions)     4-6 Weeks: -  Discontinue sling use.   Begin Active Assist ROM and advance to Active as Tolerated   Elevation in scapular plane and external rotation as tolerated   No Internal rotation or behind back until 6wks.   Begin Cuff Isometrics at 6wks with arm at the side     6-12 Weeks:   Active Assist to Active ROM Shoulder As Tolerated   Elevation in scapular plane and external rotation to tolerance   Begin internal rotation as tolerated   Light stretching at end ranges   Cuff Isometrics with the arm at the  "side     3-12 Months:   Advance to full ROM as tolerated with passive stretching at end ranges   Advance strengthening as tolerated: isometrics ? bands ? light weights (1-5    lbs); 8-12 reps/2-3 sets per rotator cuff, deltoid, and scapular stabilizers   Limit strengthening 3x/week to avoid rotator cuff tendonitis   Begin eccentrically resisted motions, pylometrics (ex. Weighted ball toss),   proprioception (es. body blade)   Begin sports related rehab at 4 ½ months, including advanced conditioning   Return to throwing at 6 months   Throw from pitcher'iMegaund at 9 months   Collision sports at 9 months   MMI is usually at 12 months post-op     Access Code: [email or web address]  URL: [email or web address]  Date: 05/28/2024  Prepared by: Mariely Serrano    Manuals 5/28 6/3 6/6 6/10 6/13 6/18       Shoulder PROM (per protocol) VR 10' 10' 10' 15' 15'                                              Neuro Re-Ed             Scap retraction 2x10 5\" 2x10 2x10 2x10 3x10 3x10       Deltoid isometrics  10x5\"  10x5\" 10x5\"        RTC isometrics      10x5\" ea                                                           Ther Ex             Pulley flexion      20x5\"       Pulley abduction             Cane ER AAROM      20x5\"       Supine cane flexion AAROM      20x5\"       Standing cane flexion AAROM             Standing cane abduction AAROM             Standing shoulder flex to 90 AROM             Standing shoulder abd to 90 AROM             Ther Activity                                       Gait Training                                       Modalities             Ice                               "

## 2024-06-20 ENCOUNTER — OFFICE VISIT (OUTPATIENT)
Dept: PHYSICAL THERAPY | Facility: CLINIC | Age: 69
End: 2024-06-20
Payer: COMMERCIAL

## 2024-06-20 DIAGNOSIS — Z98.890 STATUS POST RIGHT ROTATOR CUFF REPAIR: ICD-10-CM

## 2024-06-20 DIAGNOSIS — Z98.890 S/P RIGHT ROTATOR CUFF REPAIR: Primary | ICD-10-CM

## 2024-06-20 PROCEDURE — 97112 NEUROMUSCULAR REEDUCATION: CPT

## 2024-06-20 PROCEDURE — 97110 THERAPEUTIC EXERCISES: CPT

## 2024-06-20 PROCEDURE — 97140 MANUAL THERAPY 1/> REGIONS: CPT

## 2024-06-20 NOTE — PROGRESS NOTES
Daily Note     Today's date: 2024  Patient name: Solomon Meyers  : 1955  MRN: [id number]  Referring provider: Enmanuel Tyson PA*  Dx:   Encounter Diagnosis     ICD-10-CM    1. S/P right rotator cuff repair  Z98.890       2. Status post right rotator cuff repair  Z98.890           Start Time: 1300  Stop Time: 1339  Total time in clinic (min): 39 minutes    Subjective: Patient reports stiffness prior to therapy and no complaints of pain.     Objective: See treatment diary below    Assessment: Patient did well with progressions performed last visit. Patient is making steady progress with his PROM.     Plan: Continue per plan of care.  Progress treatment as tolerated.       Precautions: GERD, pituitary mass     DOS: 24 R RTCr (supraspinatus & infraspinatus), biceps tenodesis, acromioplasty   Next Protocol progression at Week 6: 24 -  no pulleys or canes until 6 weeks.     Post-Operative Rehabilitation Guidelines for Standard Rotator Cuff Tears     1-4 Weeks:   Sling Immobilization   Active ROM Elbow, Wrist and Hand   True Passive (ONLY) ROM Shoulder. NO ACTIVE MOTION.   Pendulums, Supine Elevation in Scapular plane, External Rotation to tolerance with arm at side. (emphasize ER, minimum goal 40°)   Scapular Stabilization exercises (sidelying)   Deltoid isometrics in neutral (submaximal) as ROM improves   No Pulley/Canes until 6 weeks post-op (these are active motions)     4-6 Weeks: -  Discontinue sling use.   Begin Active Assist ROM and advance to Active as Tolerated   Elevation in scapular plane and external rotation as tolerated   No Internal rotation or behind back until 6wks.   Begin Cuff Isometrics at 6wks with arm at the side     6-12 Weeks:   Active Assist to Active ROM Shoulder As Tolerated   Elevation in scapular plane and external rotation to tolerance   Begin internal rotation as tolerated   Light stretching at end ranges   Cuff Isometrics with the arm at the side     3-12  "Months:   Advance to full ROM as tolerated with passive stretching at end ranges   Advance strengthening as tolerated: isometrics ? bands ? light weights (1-5    lbs); 8-12 reps/2-3 sets per rotator cuff, deltoid, and scapular stabilizers   Limit strengthening 3x/week to avoid rotator cuff tendonitis   Begin eccentrically resisted motions, pylometrics (ex. Weighted ball toss),   proprioception (es. body blade)   Begin sports related rehab at 4 ½ months, including advanced conditioning   Return to throwing at 6 months   Throw from pitcher'Watchwithund at 9 months   Collision sports at 9 months   MMI is usually at 12 months post-op     Access Code: [email or web address]  URL: [email or web address]  Date: 05/28/2024  Prepared by: Mariely Serrano    Manuals 5/28 6/3 6/6 6/10 6/13 6/18 6/20      Shoulder PROM (per protocol) VR 10' 10' 10' 15' 15' 15'                                             Neuro Re-Ed             Scap retraction 2x10 5\" 2x10 2x10 2x10 3x10 3x10 3x10      Deltoid isometrics  10x5\"  10x5\" 10x5\"        RTC isometrics      10x5\" ea 10x5\" ea                                                          Ther Ex             Pulley flexion      20x5\" 20x5\"      Pulley abduction             Cane ER AAROM      20x5\" 20x5\"      Supine cane flexion AAROM      20x5\" 20x5\"      Standing cane flexion AAROM             Standing cane abduction AAROM             Standing shoulder flex to 90 AROM             Standing shoulder abd to 90 AROM             Ther Activity                                       Gait Training                                       Modalities             Ice                             "

## 2024-06-25 ENCOUNTER — EVALUATION (OUTPATIENT)
Dept: PHYSICAL THERAPY | Facility: CLINIC | Age: 69
End: 2024-06-25
Payer: COMMERCIAL

## 2024-06-25 DIAGNOSIS — Z98.890 STATUS POST RIGHT ROTATOR CUFF REPAIR: Primary | ICD-10-CM

## 2024-06-25 DIAGNOSIS — Z98.890 S/P RIGHT ROTATOR CUFF REPAIR: ICD-10-CM

## 2024-06-25 PROCEDURE — 97110 THERAPEUTIC EXERCISES: CPT

## 2024-06-25 PROCEDURE — 97112 NEUROMUSCULAR REEDUCATION: CPT

## 2024-06-25 PROCEDURE — 97140 MANUAL THERAPY 1/> REGIONS: CPT

## 2024-06-25 NOTE — PROGRESS NOTES
PT Re-Evaluation     Today's date: 2024  Patient name: Solomon Meyers  : 1955  MRN: [id number]  Referring provider: Enmanuel Tyson PA*  Dx:   Encounter Diagnosis     ICD-10-CM    1. S/P right rotator cuff repair  Z98.890                      Assessment    Assessment details: Solomon is a 69 y.o. male being treated in PT following R RTCr, biceps tenodesis, and acromioplasty on 24. Solomon has improved greatly over the last 8 visits, but still has significant limitation with his R shoulder. His PROM is limited to about 50% of full range. His protocol does not yet allow for strengthening at this time, but he has no pain with isometric muscle activation. He tolerates AAROM well and we plan to progress to AROM next week as tolerated. Solomon requires additional skilled PT in order to regain his full motion, his full strength, improve his quality of movement and return to PLOF.       Understanding of Dx/Px/POC: good  Prognosis: good    Goals  STGs (to be achieved within 2-4 weeks)  1. Pt will report no >2/10 pain w/ all activities to indicate a significant improvement in activity tolerance and pain. MET  2. Pt will be educated in and demonstrate good understanding of post-op protocol and only engage in activities appropriate for current phase while outside of PT. MET    LTGs (to be achieved within 6-8 weeks)   1. Pt will be I with HEP at d/c to promote PT carry-over. NOT YET MET  2. Pt will meet FOTO predicted score.  NOT YET MET  3. Pt will improve R shoulder ROM to WNL to increase ease w/ functional mobility.  NOT YET MET  4. Pt will improve RUE strength to 4+/5 or greater to increase ease w/ functional mobility. NOT YET MET      Plan  Planned modality interventions: unattended electrical stimulation, ultrasound, traction, thermotherapy: hydrocollator packs, low level laser therapy and cryotherapy    Planned therapy interventions: manual therapy, neuromuscular re-education, therapeutic activities,  therapeutic exercise and gait training    Frequency: 2x week  Duration in weeks: 8  Plan of Care beginning date: 5/28/2024  Plan of Care expiration date: 7/26/2024  Treatment plan discussed with: patient        Subjective Evaluation    History of Present Illness  Mechanism of injury: On 5/8/24 pt underwent routine R RTCr (supraspinatus & infraspinatus), biceps tenodesis, & acromioplasty w/o complications, pt was d/c'ed home same day. Since surgery pt has been doing well. Occasional numbness reported depending on position but overall very minimal pain. Has not needed to take rx pain medications; uses OTC occasionally. Prior to surgery pt failed course of conservative PT treatment at [facility]. [place] in [place]; returns to [place] for several months each year.     Goals include being able to raise arms overhead and be able to lift arm out the side away from body.       Interval History 6/25/2024: Pt reports his shoulder feels pretty good overall. He has min to no pain. He is following protocol precautions. He is progressing well with AAROM at home.         Objective     Active Range of Motion   Cervical/Thoracic Spine     Normal active range of motion  Left Shoulder   Normal active range of motion    Left Elbow   Normal active range of motion    Right Elbow   Normal active range of motion    Passive Range of Motion     Right Shoulder   Flexion: 110 degrees   Abduction: 90 degrees   External rotation 0°: 40 degrees   External rotation 90°: 20 degrees  Internal rotation 90°: 20 degrees    Hypomobility joint mobility PA and Inf      Strength/Myotome Testing     Left Shoulder   Normal muscle strength    Additional Strength Details  R NT per protocol             Precautions: GERD, pituitary mass     DOS: 5/8/24 R RTCr (supraspinatus & infraspinatus), biceps tenodesis, acromioplasty   Next Protocol progression at Week 6: 6/19/24     Post-Operative Rehabilitation  "Guidelines for Standard Rotator Cuff Tears     1-4 Weeks:   Sling Immobilization   Active ROM Elbow, Wrist and Hand   True Passive (ONLY) ROM Shoulder. NO ACTIVE MOTION.   Pendulums, Supine Elevation in Scapular plane, External Rotation to tolerance with arm at side. (emphasize ER, minimum goal 40°)   Scapular Stabilization exercises (sidelying)   Deltoid isometrics in neutral (submaximal) as ROM improves   No Pulley/Canes until 6 weeks post-op (these are active motions)     4-6 Weeks:   Discontinue sling use.   Begin Active Assist ROM and advance to Active as Tolerated   Elevation in scapular plane and external rotation as tolerated   No Internal rotation or behind back until 6wks.   Begin Cuff Isometrics at 6wks with arm at the side     6-12 Weeks:   Active Assist to Active ROM Shoulder As Tolerated   Elevation in scapular plane and external rotation to tolerance   Begin internal rotation as tolerated   Light stretching at end ranges   Cuff Isometrics with the arm at the side     3-12 Months:   Advance to full ROM as tolerated with passive stretching at end ranges   Advance strengthening as tolerated: isometrics ? bands ? light weights (1-5    lbs); 8-12 reps/2-3 sets per rotator cuff, deltoid, and scapular stabilizers   Limit strengthening 3x/week to avoid rotator cuff tendonitis   Begin eccentrically resisted motions, pylometrics (ex. Weighted ball toss),   proprioception (es. body blade)   Begin sports related rehab at 4 ½ months, including advanced conditioning   Return to throwing at 6 months   Throw from pitcher's mound at 9 months   Collision sports at 9 months   MMI is usually at 12 months post-op     Access Code: [email or web address]      Manuals 5/28 6/3 6/6 6/10 6/13 6/18 6/20 6/25     Shoulder PROM (per protocol) VR 10' 10' 10' 15' 15' 15' 20'                                            Neuro Re-Ed             Scap retraction        20x5\"     RTC isometrics      10x5\" ea 10x5\" ea 10x5\" ea                            " "                             Ther Ex             Pulley flexion      20x5\" 20x5\" 20x5\"     Pulley abduction        20x5\"     Cane ER AAROM      20x5\" 20x5\" 20x5\"     Supine cane flexion AAROM      20x5\" 20x5\"      Standing cane flexion AAROM        20x5\"     Standing cane abduction AAROM        20x5\"     Doorway ER stretch        nv     Gentle BTB IR strap stretch        nv     Standing shoulder flex to 90 AROM             Standing shoulder abd to 90 AROM             Ther Activity                                       Gait Training                                       Modalities             Ice                               "

## 2024-06-27 ENCOUNTER — OFFICE VISIT (OUTPATIENT)
Dept: PHYSICAL THERAPY | Facility: CLINIC | Age: 69
End: 2024-06-27
Payer: COMMERCIAL

## 2024-06-27 DIAGNOSIS — Z98.890 STATUS POST RIGHT ROTATOR CUFF REPAIR: Primary | ICD-10-CM

## 2024-06-27 DIAGNOSIS — Z98.890 S/P RIGHT ROTATOR CUFF REPAIR: ICD-10-CM

## 2024-06-27 PROCEDURE — 97112 NEUROMUSCULAR REEDUCATION: CPT

## 2024-06-27 PROCEDURE — 97110 THERAPEUTIC EXERCISES: CPT

## 2024-06-27 NOTE — PROGRESS NOTES
Daily Note     Today's date: 2024  Patient name: Solomon Meyers  : 1955  MRN: [id number]  Referring provider: Enmanuel Tyson PA*  Dx:   Encounter Diagnosis     ICD-10-CM    1. Status post right rotator cuff repair  Z98.890       2. S/P right rotator cuff repair  Z98.890           Start Time: 1300  Stop Time: 1345  Total time in clinic (min): 45 minutes    Subjective: Patient reports soreness in the morning after sleeping on his R side. However, the soreness did not last very long once he woke up.    Objective: See treatment diary below    Assessment: Tolerated treatment well. Patient demonstrated fatigue post treatment, exhibited good technique with therapeutic exercises, and would benefit from continued PT.    Plan: Continue per plan of care.      Precautions: GERD, pituitary mass     DOS: 24 R RTCr (supraspinatus & infraspinatus), biceps tenodesis, acromioplasty   Next Protocol progression at Week 6: 24 -  no pulleys or canes until 6 weeks.     Post-Operative Rehabilitation Guidelines for Standard Rotator Cuff Tears     1-4 Weeks:   Sling Immobilization   Active ROM Elbow, Wrist and Hand   True Passive (ONLY) ROM Shoulder. NO ACTIVE MOTION.   Pendulums, Supine Elevation in Scapular plane, External Rotation to tolerance with arm at side. (emphasize ER, minimum goal 40°)   Scapular Stabilization exercises (sidelying)   Deltoid isometrics in neutral (submaximal) as ROM improves   No Pulley/Canes until 6 weeks post-op (these are active motions)     4-6 Weeks: -  Discontinue sling use.   Begin Active Assist ROM and advance to Active as Tolerated   Elevation in scapular plane and external rotation as tolerated   No Internal rotation or behind back until 6wks.   Begin Cuff Isometrics at 6wks with arm at the side     6-12 Weeks:   Active Assist to Active ROM Shoulder As Tolerated   Elevation in scapular plane and external rotation to tolerance   Begin internal rotation as tolerated  "  Light stretching at end ranges   Cuff Isometrics with the arm at the side     3-12 Months:   Advance to full ROM as tolerated with passive stretching at end ranges   Advance strengthening as tolerated: isometrics ? bands ? light weights (1-5    lbs); 8-12 reps/2-3 sets per rotator cuff, deltoid, and scapular stabilizers   Limit strengthening 3x/week to avoid rotator cuff tendonitis   Begin eccentrically resisted motions, pylometrics (ex. Weighted ball toss),   proprioception (es. body blade)   Begin sports related rehab at 4 ½ months, including advanced conditioning   Return to throwing at 6 months   Throw from pitcher'Dr. Z at 9 months   Collision sports at 9 months   MMI is usually at 12 months post-op     Access Code: [email or web address]  URL: [email or web address]  Date: 05/28/2024  Prepared by: Mariely Serrano    Manuals 5/28 6/3 6/6 6/10 6/13 6/18 6/20 6/27     Shoulder PROM (per protocol) VR 10' 10' 10' 15' 15' 15' 15'                                            Neuro Re-Ed             Scap retraction 2x10 5\" 2x10 2x10 2x10 3x10 3x10 3x10 3x10     Deltoid isometrics  10x5\"  10x5\" 10x5\"        RTC isometrics      10x5\" ea 10x5\" ea 10x5\" ea                                                         Ther Ex             Pulley flexion      20x5\" 20x5\" 20x5\"     Pulley abduction             Cane ER AAROM      20x5\" 20x5\" 20x5\"     Supine cane flexion AAROM      20x5\" 20x5\" 20x5\"     Door AAROM ER        5\"x5     Standing cane flexion AAROM             Standing cane abduction AAROM             Standing shoulder flex to 90 AROM             Standing shoulder abd to 90 AROM             Ther Activity                                       Gait Training                                       Modalities             Ice                             "

## 2024-07-01 ENCOUNTER — OFFICE VISIT (OUTPATIENT)
Dept: PHYSICAL THERAPY | Facility: CLINIC | Age: 69
End: 2024-07-01
Payer: COMMERCIAL

## 2024-07-01 DIAGNOSIS — Z98.890 STATUS POST RIGHT ROTATOR CUFF REPAIR: Primary | ICD-10-CM

## 2024-07-01 PROCEDURE — 97112 NEUROMUSCULAR REEDUCATION: CPT

## 2024-07-01 PROCEDURE — 97110 THERAPEUTIC EXERCISES: CPT

## 2024-07-01 PROCEDURE — 97140 MANUAL THERAPY 1/> REGIONS: CPT

## 2024-07-01 NOTE — PROGRESS NOTES
Daily Note     Today's date: 2024  Patient name: Solomon Meyers  : 1955  MRN: [id number]  Referring provider: Enmanuel Tyson PA*  Dx:   Encounter Diagnosis     ICD-10-CM    1. Status post right rotator cuff repair  Z98.890                        Subjective: Patient reports each visit gets better.     Objective: See treatment diary below    Assessment: Progressed AAROM to against gravity. Updated HEP provided. Pt tolerated well. Mobility is improving with added ER doorway stretch and joint mobility. Continue to progress to full PROM and AAROM adding AROM when tolerable.     Plan: Continue per plan of care.      Precautions: GERD, pituitary mass     DOS: 24 R RTCr (supraspinatus & infraspinatus), biceps tenodesis, acromioplasty   Next Protocol progression at Week 6: 24 -  no pulleys or canes until 6 weeks.     Post-Operative Rehabilitation Guidelines for Standard Rotator Cuff Tears     1-4 Weeks:   Sling Immobilization   Active ROM Elbow, Wrist and Hand   True Passive (ONLY) ROM Shoulder. NO ACTIVE MOTION.   Pendulums, Supine Elevation in Scapular plane, External Rotation to tolerance with arm at side. (emphasize ER, minimum goal 40°)   Scapular Stabilization exercises (sidelying)   Deltoid isometrics in neutral (submaximal) as ROM improves   No Pulley/Canes until 6 weeks post-op (these are active motions)     4-6 Weeks: -  Discontinue sling use.   Begin Active Assist ROM and advance to Active as Tolerated   Elevation in scapular plane and external rotation as tolerated   No Internal rotation or behind back until 6wks.   Begin Cuff Isometrics at 6wks with arm at the side     6-12 Weeks:   Active Assist to Active ROM Shoulder As Tolerated   Elevation in scapular plane and external rotation to tolerance   Begin internal rotation as tolerated   Light stretching at end ranges   Cuff Isometrics with the arm at the side     3-12 Months:   Advance to full ROM as tolerated with passive  "stretching at end ranges   Advance strengthening as tolerated: isometrics ? bands ? light weights (1-5    lbs); 8-12 reps/2-3 sets per rotator cuff, deltoid, and scapular stabilizers   Limit strengthening 3x/week to avoid rotator cuff tendonitis   Begin eccentrically resisted motions, pylometrics (ex. Weighted ball toss),   proprioception (es. body blade)   Begin sports related rehab at 4 ½ months, including advanced conditioning   Return to throwing at 6 months   Throw from pitcher'xCloudund at 9 months   Collision sports at 9 months   MMI is usually at 12 months post-op     Access Code: [email or web address]  URL: [email or web address]  Date: 05/28/2024  Prepared by: Mariely Serrano    Manuals 5/28 6/3 6/6 6/10 6/13 6/18 6/20 6/27 7/1    Shoulder PROM (per protocol) VR 10' 10' 10' 15' 15' 15' 15' 15'    Shoulder PA and in GHJ mobs gr IV         3x30 ea                              Neuro Re-Ed             Scap retraction 2x10 5\" 2x10 2x10 2x10 3x10 3x10 3x10 3x10 3x10    Deltoid isometrics  10x5\"  10x5\" 10x5\"        RTC isometrics      10x5\" ea 10x5\" ea 10x5\" ea 10x5\" ea                                                        Ther Ex             Pulley flexion      20x5\" 20x5\" 20x5\" 20x5\"    Pulley abduction         20x5\"    Cane ER AAROM      20x5\" 20x5\" 20x5\" 20x5\"    Supine cane flexion AAROM      20x5\" 20x5\" 20x5\"     Door AAROM ER        5\"x5 10x5\"    Standing cane flexion AAROM         20x5\"    Standing cane abduction AAROM         20x5\"    Standing shoulder flex to 90 AROM             Standing shoulder abd to 90 AROM             Ther Activity                                       Gait Training                                       Modalities             Ice                             "

## 2024-07-03 ENCOUNTER — OFFICE VISIT (OUTPATIENT)
Dept: PHYSICAL THERAPY | Facility: CLINIC | Age: 69
End: 2024-07-03
Payer: COMMERCIAL

## 2024-07-03 DIAGNOSIS — Z98.890 STATUS POST RIGHT ROTATOR CUFF REPAIR: Primary | ICD-10-CM

## 2024-07-03 DIAGNOSIS — Z98.890 S/P RIGHT ROTATOR CUFF REPAIR: ICD-10-CM

## 2024-07-03 PROCEDURE — 97110 THERAPEUTIC EXERCISES: CPT

## 2024-07-03 PROCEDURE — 97112 NEUROMUSCULAR REEDUCATION: CPT

## 2024-07-03 NOTE — PROGRESS NOTES
Daily Note     Today's date: 7/3/2024  Patient name: Solomon Meyers  : 1955  MRN: [id number]  Referring provider: Enmanuel Tyson PA*  Dx:   Encounter Diagnosis     ICD-10-CM    1. Status post right rotator cuff repair  Z98.890       2. S/P right rotator cuff repair  Z98.890                        Subjective: Patient reports his shoulder is improving but he has difficulty with reaching.     Objective: See treatment diary below; R shoulder PROM: flexion: 120 degrees, abduction 110 degrees, ER: 45 degrees    Assessment: Pt required cueing for shoulder arthrokinematics with shoulder flexion and abduction AAROM. Shoulder AROM at this time is very limited with moderate UT compensation. Continue to progress to full PROM and AAROM adding AROM when tolerable.     Plan: Continue per plan of care.      Precautions: GERD, pituitary mass     DOS: 24 R RTCr (supraspinatus & infraspinatus), biceps tenodesis, acromioplasty   Next Protocol progression at Week 6: 24 -  no pulleys or canes until 6 weeks.     Post-Operative Rehabilitation Guidelines for Standard Rotator Cuff Tears     1-4 Weeks:   Sling Immobilization   Active ROM Elbow, Wrist and Hand   True Passive (ONLY) ROM Shoulder. NO ACTIVE MOTION.   Pendulums, Supine Elevation in Scapular plane, External Rotation to tolerance with arm at side. (emphasize ER, minimum goal 40°)   Scapular Stabilization exercises (sidelying)   Deltoid isometrics in neutral (submaximal) as ROM improves   No Pulley/Canes until 6 weeks post-op (these are active motions)     4-6 Weeks: -  Discontinue sling use.   Begin Active Assist ROM and advance to Active as Tolerated   Elevation in scapular plane and external rotation as tolerated   No Internal rotation or behind back until 6wks.   Begin Cuff Isometrics at 6wks with arm at the side     6-12 Weeks:   Active Assist to Active ROM Shoulder As Tolerated   Elevation in scapular plane and external rotation to tolerance  "  Begin internal rotation as tolerated   Light stretching at end ranges   Cuff Isometrics with the arm at the side     3-12 Months:   Advance to full ROM as tolerated with passive stretching at end ranges   Advance strengthening as tolerated: isometrics ? bands ? light weights (1-5    lbs); 8-12 reps/2-3 sets per rotator cuff, deltoid, and scapular stabilizers   Limit strengthening 3x/week to avoid rotator cuff tendonitis   Begin eccentrically resisted motions, pylometrics (ex. Weighted ball toss),   proprioception (es. body blade)   Begin sports related rehab at 4 ½ months, including advanced conditioning   Return to throwing at 6 months   Throw from pitcher'Cellay at 9 months   Collision sports at 9 months   MMI is usually at 12 months post-op     Access Code: [email or web address]  URL: [email or web address]  Date: 05/28/2024  Prepared by: Mariely Serrano    Manuals 5/28 6/3 6/6 6/10 6/13 6/18 6/20 6/27 7/1 7/3   Shoulder PROM (per protocol) VR 10' 10' 10' 15' 15' 15' 15' 15' 15'   Shoulder PA and in GHJ mobs gr IV         3x30 ea                              Neuro Re-Ed             Scap retraction 2x10 5\" 2x10 2x10 2x10 3x10 3x10 3x10 3x10 3x10 3x10    Deltoid isometrics  10x5\"  10x5\" 10x5\"        RTC isometrics      10x5\" ea 10x5\" ea 10x5\" ea 10x5\" ea 10x5\" ea                                                       Ther Ex             Pulley flexion      20x5\" 20x5\" 20x5\" 20x5\" 20x5\"   Pulley abduction         20x5\" 20x5\"   Cane ER AAROM      20x5\" 20x5\" 20x5\" 20x5\" 20x5\"   Supine cane flexion AAROM      20x5\" 20x5\" 20x5\"     Door AAROM ER        5\"x5 10x5\" 10x5\"   Standing cane flexion AAROM         20x5\" 20x5\"   Standing cane abduction AAROM         20x5\" 20x5\"   Standing shoulder flex to 90 AROM             Standing shoulder abd to 90 AROM             Ther Activity                                                    Gait Training                                       Modalities             Ice                  "

## 2024-07-09 ENCOUNTER — OFFICE VISIT (OUTPATIENT)
Dept: PHYSICAL THERAPY | Facility: CLINIC | Age: 69
End: 2024-07-09
Payer: COMMERCIAL

## 2024-07-09 DIAGNOSIS — Z98.890 STATUS POST RIGHT ROTATOR CUFF REPAIR: Primary | ICD-10-CM

## 2024-07-09 DIAGNOSIS — Z98.890 S/P RIGHT ROTATOR CUFF REPAIR: ICD-10-CM

## 2024-07-09 PROCEDURE — 97140 MANUAL THERAPY 1/> REGIONS: CPT

## 2024-07-09 PROCEDURE — 97110 THERAPEUTIC EXERCISES: CPT

## 2024-07-09 NOTE — PROGRESS NOTES
Daily Note     Today's date: 2024  Patient name: Solomon Meyers  : 1955  MRN: [id number]  Referring provider: Enmanuel Tyson PA*  Dx:   Encounter Diagnosis     ICD-10-CM    1. Status post right rotator cuff repair  Z98.890       2. S/P right rotator cuff repair  Z98.890                        Subjective: Patient reports each day is a bit better.     Objective: See treatment diary below; R shoulder PROM: flexion: 120 degrees, abduction 110 degrees, ER: 45 degrees    Assessment: Began AROM today. Pt has significant weakness and less than 30 deg AROM in all planes. PROM still limited about 40%.     Plan: Continue per plan of care.      Precautions: GERD, pituitary mass     DOS: 24 R RTCr (supraspinatus & infraspinatus), biceps tenodesis, acromioplasty   Next Protocol progression at Week 6: 24 -  no pulleys or canes until 6 weeks.     Post-Operative Rehabilitation Guidelines for Standard Rotator Cuff Tears     1-4 Weeks:   Sling Immobilization   Active ROM Elbow, Wrist and Hand   True Passive (ONLY) ROM Shoulder. NO ACTIVE MOTION.   Pendulums, Supine Elevation in Scapular plane, External Rotation to tolerance with arm at side. (emphasize ER, minimum goal 40°)   Scapular Stabilization exercises (sidelying)   Deltoid isometrics in neutral (submaximal) as ROM improves   No Pulley/Canes until 6 weeks post-op (these are active motions)     4-6 Weeks: -  Discontinue sling use.   Begin Active Assist ROM and advance to Active as Tolerated   Elevation in scapular plane and external rotation as tolerated   No Internal rotation or behind back until 6wks.   Begin Cuff Isometrics at 6wks with arm at the side     6-12 Weeks:   Active Assist to Active ROM Shoulder As Tolerated   Elevation in scapular plane and external rotation to tolerance   Begin internal rotation as tolerated   Light stretching at end ranges   Cuff Isometrics with the arm at the side     3-12 Months:   Advance to full ROM as  "tolerated with passive stretching at end ranges   Advance strengthening as tolerated: isometrics ? bands ? light weights (1-5    lbs); 8-12 reps/2-3 sets per rotator cuff, deltoid, and scapular stabilizers   Limit strengthening 3x/week to avoid rotator cuff tendonitis   Begin eccentrically resisted motions, pylometrics (ex. Weighted ball toss),   proprioception (es. body blade)   Begin sports related rehab at 4 ½ months, including advanced conditioning   Return to throwing at 6 months   Throw from pitcher'[employer] at 9 months   Collision sports at 9 months   MMI is usually at 12 months post-op     Access Code: [email or web address]  URL: [email or web address]  Date: 05/28/2024  Prepared by: Mariely Serrano    Manuals 7/9     6/18 6/20 6/27 7/1 7/3   Shoulder PROM (per protocol) 15'     15' 15' 15' 15' 15'   Shoulder PA and in GHJ mobs gr IV 3x30\"ea        3x30 ea                              Neuro Re-Ed             Scap retraction 3x10     3x10 3x10 3x10 3x10 3x10    Deltoid isometrics             RTC isometrics 10x5\" ea     10x5\" ea 10x5\" ea 10x5\" ea 10x5\" ea 10x5\" ea                                                       Ther Ex             Pulley flexion 20x5\"     20x5\" 20x5\" 20x5\" 20x5\" 20x5\"   Pulley abduction 20x5\"        20x5\" 20x5\"   Cane ER AAROM 20x5\"     20x5\" 20x5\" 20x5\" 20x5\" 20x5\"   Supine cane flexion AAROM      20x5\" 20x5\" 20x5\"     Door AAROM ER 10x5\"       5\"x5 10x5\" 10x5\"   Standing cane flexion AAROM 20x5\"        20x5\" 20x5\"   Standing cane abduction AAROM 20x5\"        20x5\" 20x5\"   Standing shoulder flex to 90 AROM 2x10            Standing shoulder abd to 90 AROM 2x10            Ther Activity                                                    Gait Training                                       Modalities             Ice                             "

## 2024-07-11 ENCOUNTER — APPOINTMENT (OUTPATIENT)
Dept: PHYSICAL THERAPY | Facility: CLINIC | Age: 69
End: 2024-07-11
Payer: COMMERCIAL

## 2024-07-16 ENCOUNTER — OFFICE VISIT (OUTPATIENT)
Dept: PHYSICAL THERAPY | Facility: CLINIC | Age: 69
End: 2024-07-16
Payer: COMMERCIAL

## 2024-07-16 DIAGNOSIS — Z98.890 S/P RIGHT ROTATOR CUFF REPAIR: ICD-10-CM

## 2024-07-16 DIAGNOSIS — Z98.890 STATUS POST RIGHT ROTATOR CUFF REPAIR: Primary | ICD-10-CM

## 2024-07-16 PROCEDURE — 97140 MANUAL THERAPY 1/> REGIONS: CPT

## 2024-07-16 PROCEDURE — 97110 THERAPEUTIC EXERCISES: CPT

## 2024-07-16 NOTE — PROGRESS NOTES
Daily Note     Today's date: 2024  Patient name: Solomon Meyers  : 1955  MRN: [id number]  Referring provider: Enmanuel Tyson PA*  Dx:   Encounter Diagnosis     ICD-10-CM    1. Status post right rotator cuff repair  Z98.890       2. S/P right rotator cuff repair  Z98.890                        Subjective: Patient reports each day is a bit better.     Objective: See treatment diary below; R shoulder PROM: flexion: 120 degrees, abduction 110 degrees, ER: 45 degrees    Assessment: Pt has significant difficulty with AROM. Significant UT compensation and deltoid compensation, little RTC activation. Continue to progress as able.     Plan: Continue per plan of care.      Precautions: GERD, pituitary mass     DOS: 24 R RTCr (supraspinatus & infraspinatus), biceps tenodesis, acromioplasty   Next Protocol progression at Week 6: 24 -  no pulleys or canes until 6 weeks.     Post-Operative Rehabilitation Guidelines for Standard Rotator Cuff Tears     1-4 Weeks:   Sling Immobilization   Active ROM Elbow, Wrist and Hand   True Passive (ONLY) ROM Shoulder. NO ACTIVE MOTION.   Pendulums, Supine Elevation in Scapular plane, External Rotation to tolerance with arm at side. (emphasize ER, minimum goal 40°)   Scapular Stabilization exercises (sidelying)   Deltoid isometrics in neutral (submaximal) as ROM improves   No Pulley/Canes until 6 weeks post-op (these are active motions)     4-6 Weeks: -  Discontinue sling use.   Begin Active Assist ROM and advance to Active as Tolerated   Elevation in scapular plane and external rotation as tolerated   No Internal rotation or behind back until 6wks.   Begin Cuff Isometrics at 6wks with arm at the side     6-12 Weeks:   Active Assist to Active ROM Shoulder As Tolerated   Elevation in scapular plane and external rotation to tolerance   Begin internal rotation as tolerated   Light stretching at end ranges   Cuff Isometrics with the arm at the side     3-12 Months:  "  Advance to full ROM as tolerated with passive stretching at end ranges   Advance strengthening as tolerated: isometrics ? bands ? light weights (1-5    lbs); 8-12 reps/2-3 sets per rotator cuff, deltoid, and scapular stabilizers   Limit strengthening 3x/week to avoid rotator cuff tendonitis   Begin eccentrically resisted motions, pylometrics (ex. Weighted ball toss),   proprioception (es. body blade)   Begin sports related rehab at 4 ½ months, including advanced conditioning   Return to throwing at 6 months   Throw from [employer] at 9 months   Collision sports at 9 months   MMI is usually at 12 months post-op     Access Code: [email or web address]  URL: [email or web address]  Date: 05/28/2024  Prepared by: Mariely Serrano    Manuals 7/9 7/16    6/18 6/20 6/27 7/1 7/3   Shoulder PROM (per protocol) 15' 15'    15' 15' 15' 15' 15'   Shoulder PA and in [facility] mobs gr IV 3x30\"ea        3x30 ea                              Neuro Re-Ed             Scap retraction 3x10 3x10    3x10 3x10 3x10 3x10 3x10    Deltoid isometrics             RTC isometrics 10x5\" ea 10x5\" ea    10x5\" ea 10x5\" ea 10x5\" ea 10x5\" ea 10x5\" ea                                                       Ther Ex             Pulley flexion 20x5\" 20x5\"    20x5\" 20x5\" 20x5\" 20x5\" 20x5\"   Pulley abduction 20x5\" 20x5\"       20x5\" 20x5\"   Cane ER AAROM 20x5\" 20x5\"    20x5\" 20x5\" 20x5\" 20x5\" 20x5\"   Supine cane flexion AAROM      20x5\" 20x5\" 20x5\"     Door AAROM ER 10x5\" 10x5\"      5\"x5 10x5\" 10x5\"   Standing cane flexion AAROM 20x5\" 20x5\"       20x5\" 20x5\"   Standing cane abduction AAROM 20x5\" 20x5\"       20x5\" 20x5\"   Standing shoulder flex to 90 AROM 2x10 2x10           Standing shoulder abd to 90 AROM 2x10 2x10           Ther Activity                                                    Gait Training                                       Modalities             Ice                             "

## 2024-07-18 ENCOUNTER — OFFICE VISIT (OUTPATIENT)
Dept: PHYSICAL THERAPY | Facility: CLINIC | Age: 69
End: 2024-07-18
Payer: COMMERCIAL

## 2024-07-18 DIAGNOSIS — Z98.890 S/P RIGHT ROTATOR CUFF REPAIR: ICD-10-CM

## 2024-07-18 DIAGNOSIS — Z98.890 STATUS POST RIGHT ROTATOR CUFF REPAIR: Primary | ICD-10-CM

## 2024-07-18 PROCEDURE — 97110 THERAPEUTIC EXERCISES: CPT

## 2024-07-18 PROCEDURE — 97140 MANUAL THERAPY 1/> REGIONS: CPT

## 2024-07-18 PROCEDURE — 97112 NEUROMUSCULAR REEDUCATION: CPT

## 2024-07-18 NOTE — PROGRESS NOTES
Daily Note     Today's date: 2024  Patient name: Solomon Meyers  : 1955  MRN: [id number]  Referring provider: Enmanuel Tyson PA*  Dx:   Encounter Diagnosis     ICD-10-CM    1. Status post right rotator cuff repair  Z98.890       2. S/P right rotator cuff repair  Z98.890                        Subjective: Patient reports each day is a bit better with being able to lift his arm.     Objective: See treatment diary below; R shoulder PROM: flexion: 123 degrees, abduction 115 degrees, ER: 53 degrees    Assessment: Pt continues to demonstrate significant difficulty with AROM secondary to compensation. Shoulder PROM is slowly increasing.     Plan: Continue per plan of care.      Precautions: GERD, pituitary mass     DOS: 24 R RTCr (supraspinatus & infraspinatus), biceps tenodesis, acromioplasty   Next Protocol progression at Week 6: 24 -  no pulleys or canes until 6 weeks.     Post-Operative Rehabilitation Guidelines for Standard Rotator Cuff Tears     1-4 Weeks:   Sling Immobilization   Active ROM Elbow, Wrist and Hand   True Passive (ONLY) ROM Shoulder. NO ACTIVE MOTION.   Pendulums, Supine Elevation in Scapular plane, External Rotation to tolerance with arm at side. (emphasize ER, minimum goal 40°)   Scapular Stabilization exercises (sidelying)   Deltoid isometrics in neutral (submaximal) as ROM improves   No Pulley/Canes until 6 weeks post-op (these are active motions)     4-6 Weeks: -  Discontinue sling use.   Begin Active Assist ROM and advance to Active as Tolerated   Elevation in scapular plane and external rotation as tolerated   No Internal rotation or behind back until 6wks.   Begin Cuff Isometrics at 6wks with arm at the side     6-12 Weeks:   Active Assist to Active ROM Shoulder As Tolerated   Elevation in scapular plane and external rotation to tolerance   Begin internal rotation as tolerated   Light stretching at end ranges   Cuff Isometrics with the arm at the side     3-12  "Months:   Advance to full ROM as tolerated with passive stretching at end ranges   Advance strengthening as tolerated: isometrics ? bands ? light weights (1-5    lbs); 8-12 reps/2-3 sets per rotator cuff, deltoid, and scapular stabilizers   Limit strengthening 3x/week to avoid rotator cuff tendonitis   Begin eccentrically resisted motions, pylometrics (ex. Weighted ball toss),   proprioception (es. body blade)   Begin sports related rehab at 4 ½ months, including advanced conditioning   Return to throwing at 6 months   Throw from [employer] at 9 months   Collision sports at 9 months   MMI is usually at 12 months post-op     Access Code: [email or web address]  URL: [email or web address]  Date: 05/28/2024  Prepared by: Mariely Serrano    Manuals 7/9 7/16 7/18   6/18 6/20 6/27 7/1 7/3   Shoulder PROM (per protocol) 15' 15' 15'   15' 15' 15' 15' 15'   Shoulder PA and in [facility] mobs gr IV 3x30\"ea        3x30 ea                              Neuro Re-Ed             Scap retraction 3x10 3x10 3x10   3x10 3x10 3x10 3x10 3x10    Deltoid isometrics             RTC isometrics 10x5\" ea 10x5\" ea 10x5\" ea   10x5\" ea 10x5\" ea 10x5\" ea 10x5\" ea 10x5\" ea                                                       Ther Ex             Pulley flexion 20x5\" 20x5\" 20x5\"   20x5\" 20x5\" 20x5\" 20x5\" 20x5\"   Pulley abduction 20x5\" 20x5\" 20x5\"      20x5\" 20x5\"   Cane ER AAROM 20x5\" 20x5\" 20x5\"   20x5\" 20x5\" 20x5\" 20x5\" 20x5\"   Supine cane flexion AAROM      20x5\" 20x5\" 20x5\"     Door AAROM ER 10x5\" 10x5\" 10x5\"      5\"x5 10x5\" 10x5\"   Standing cane flexion AAROM 20x5\" 20x5\" 20x5\"       20x5\" 20x5\"   Standing cane abduction AAROM 20x5\" 20x5\" 20x5\"       20x5\" 20x5\"   Standing shoulder flex to 90 AROM 2x10 2x10 2x10          Standing shoulder abd to 90 AROM 2x10 2x10 2x10          Ther Activity                                                    Gait Training                                       Modalities             Ice                             "

## 2024-07-23 ENCOUNTER — OFFICE VISIT (OUTPATIENT)
Dept: PHYSICAL THERAPY | Facility: CLINIC | Age: 69
End: 2024-07-23
Payer: COMMERCIAL

## 2024-07-23 DIAGNOSIS — Z98.890 S/P RIGHT ROTATOR CUFF REPAIR: ICD-10-CM

## 2024-07-23 DIAGNOSIS — Z98.890 STATUS POST RIGHT ROTATOR CUFF REPAIR: Primary | ICD-10-CM

## 2024-07-23 PROCEDURE — 97140 MANUAL THERAPY 1/> REGIONS: CPT

## 2024-07-23 PROCEDURE — 97110 THERAPEUTIC EXERCISES: CPT

## 2024-07-23 NOTE — PROGRESS NOTES
Daily Note     Today's date: 2024  Patient name: Solomon Meyers  : 1955  MRN: [id number]  Referring provider: Enmanuel yTson PA*  Dx:   Encounter Diagnosis     ICD-10-CM    1. Status post right rotator cuff repair  Z98.890       2. S/P right rotator cuff repair  Z98.890                          Subjective: Patient reports each day is a bit better with being able to lift his arm.     Objective: See treatment diary below; R shoulder PROM: flexion: 123 degrees, abduction 115 degrees, ER: 53 degrees    Assessment: Continues to have very weak and limited AROM. Continue to progress as able. May want to try full AROM gravity eliminated supine on table.     Plan: Continue per plan of care.      Precautions: GERD, pituitary mass     DOS: 24 R RTCr (supraspinatus & infraspinatus), biceps tenodesis, acromioplasty   Next Protocol progression at Week 6: 24 -  no pulleys or canes until 6 weeks.     Post-Operative Rehabilitation Guidelines for Standard Rotator Cuff Tears     1-4 Weeks:   Sling Immobilization   Active ROM Elbow, Wrist and Hand   True Passive (ONLY) ROM Shoulder. NO ACTIVE MOTION.   Pendulums, Supine Elevation in Scapular plane, External Rotation to tolerance with arm at side. (emphasize ER, minimum goal 40°)   Scapular Stabilization exercises (sidelying)   Deltoid isometrics in neutral (submaximal) as ROM improves   No Pulley/Canes until 6 weeks post-op (these are active motions)     4-6 Weeks: -  Discontinue sling use.   Begin Active Assist ROM and advance to Active as Tolerated   Elevation in scapular plane and external rotation as tolerated   No Internal rotation or behind back until 6wks.   Begin Cuff Isometrics at 6wks with arm at the side     6-12 Weeks:   Active Assist to Active ROM Shoulder As Tolerated   Elevation in scapular plane and external rotation to tolerance   Begin internal rotation as tolerated   Light stretching at end ranges   Cuff Isometrics with the arm at  "the side     3-12 Months:   Advance to full ROM as tolerated with passive stretching at end ranges   Advance strengthening as tolerated: isometrics ? bands ? light weights (1-5    lbs); 8-12 reps/2-3 sets per rotator cuff, deltoid, and scapular stabilizers   Limit strengthening 3x/week to avoid rotator cuff tendonitis   Begin eccentrically resisted motions, pylometrics (ex. Weighted ball toss),   proprioception (es. body blade)   Begin sports related rehab at 4 ½ months, including advanced conditioning   Return to throwing at 6 months   Throw from [place] at 9 months   Collision sports at 9 months   MMI is usually at 12 months post-op     Access Code: [email or web address]  URL: [email or web address]  Date: 05/28/2024  Prepared by: Mariely Serrano    Manuals 7/9 7/16 7/18 7/23  6/18 6/20 6/27 7/1 7/3   Shoulder PROM (per protocol) 15' 15' 15' 15'  15' 15' 15' 15' 15'   Shoulder PA and in [facility] mobs gr IV 3x30\"ea        3x30 ea                              Neuro Re-Ed             Scap retraction 3x10 3x10 3x10 3x10  3x10 3x10 3x10 3x10 3x10    Deltoid isometrics             RTC isometrics 10x5\" ea 10x5\" ea 10x5\" ea 10x5\" ea  10x5\" ea 10x5\" ea 10x5\" ea 10x5\" ea 10x5\" ea                                                       Ther Ex             Pulley flexion 20x5\" 20x5\" 20x5\" 20x5\"  20x5\" 20x5\" 20x5\" 20x5\" 20x5\"   Pulley abduction 20x5\" 20x5\" 20x5\" 20x5\"     20x5\" 20x5\"   Cane ER AAROM 20x5\" 20x5\" 20x5\" 20x5\"  20x5\" 20x5\" 20x5\" 20x5\" 20x5\"   Supine cane flexion AAROM      20x5\" 20x5\" 20x5\"     Door AAROM ER 10x5\" 10x5\" 10x5\"      5\"x5 10x5\" 10x5\"   Standing cane flexion AAROM 20x5\" 20x5\" 20x5\"  20x5\"     20x5\" 20x5\"   Standing cane abduction AAROM 20x5\" 20x5\" 20x5\"  20x5\"     20x5\" 20x5\"   Standing shoulder flex to 90 AROM 2x10 2x10 2x10 2x10         Standing shoulder abd to 90 AROM 2x10 2x10 2x10 2x10         Standing shoulder ER AROM    2x10         Ther Activity                                                  "   Gait Training                                       Modalities             Ice

## 2024-07-25 ENCOUNTER — OFFICE VISIT (OUTPATIENT)
Dept: PHYSICAL THERAPY | Facility: CLINIC | Age: 69
End: 2024-07-25
Payer: COMMERCIAL

## 2024-07-25 DIAGNOSIS — Z98.890 STATUS POST RIGHT ROTATOR CUFF REPAIR: Primary | ICD-10-CM

## 2024-07-25 DIAGNOSIS — Z98.890 S/P RIGHT ROTATOR CUFF REPAIR: ICD-10-CM

## 2024-07-25 PROCEDURE — 97110 THERAPEUTIC EXERCISES: CPT

## 2024-07-25 PROCEDURE — 97140 MANUAL THERAPY 1/> REGIONS: CPT

## 2024-07-25 NOTE — PROGRESS NOTES
Daily Note     Today's date: 2024  Patient name: Solomon Meyers  : 1955  MRN: [id number]  Referring provider: Enmanuel Tyson PA*  Dx:   Encounter Diagnosis     ICD-10-CM    1. Status post right rotator cuff repair  Z98.890       2. S/P right rotator cuff repair  Z98.890           Start Time: 1200  Stop Time: 1245  Total time in clinic (min): 45 minutes    Subjective: Patient notes R deltoid soreness and improvement with AROM.     Objective: See treatment diary below; R shoulder PROM: flexion: 130 degrees, abduction 115 degrees, ER: 55 degrees    Assessment: Continues to have very weak and limited AROM ABD > FF. Slightly improve standing FF following supine flexion to 90°. Progress as per surgical protocol.     Plan: Continue per plan of care.      Precautions: GERD, pituitary mass     DOS: 24 R RTCr (supraspinatus & infraspinatus), biceps tenodesis, acromioplasty   Next Protocol progression at Week 6: 24 -  no pulleys or canes until 6 weeks.     Post-Operative Rehabilitation Guidelines for Standard Rotator Cuff Tears     1-4 Weeks:   Sling Immobilization   Active ROM Elbow, Wrist and Hand   True Passive (ONLY) ROM Shoulder. NO ACTIVE MOTION.   Pendulums, Supine Elevation in Scapular plane, External Rotation to tolerance with arm at side. (emphasize ER, minimum goal 40°)   Scapular Stabilization exercises (sidelying)   Deltoid isometrics in neutral (submaximal) as ROM improves   No Pulley/Canes until 6 weeks post-op (these are active motions)     4-6 Weeks: -  Discontinue sling use.   Begin Active Assist ROM and advance to Active as Tolerated   Elevation in scapular plane and external rotation as tolerated   No Internal rotation or behind back until 6wks.   Begin Cuff Isometrics at 6wks with arm at the side     6-12 Weeks:   Active Assist to Active ROM Shoulder As Tolerated   Elevation in scapular plane and external rotation to tolerance   Begin internal rotation as tolerated   Light  "stretching at end ranges   Cuff Isometrics with the arm at the side     3-12 Months:   Advance to full ROM as tolerated with passive stretching at end ranges   Advance strengthening as tolerated: isometrics ? bands ? light weights (1-5    lbs); 8-12 reps/2-3 sets per rotator cuff, deltoid, and scapular stabilizers   Limit strengthening 3x/week to avoid rotator cuff tendonitis   Begin eccentrically resisted motions, pylometrics (ex. Weighted ball toss),   proprioception (es. body blade)   Begin sports related rehab at 4 ½ months, including advanced conditioning   Return to throwing at 6 months   Throw from pitcher'[employer] at 9 months   Collision sports at 9 months   MMI is usually at 12 months post-op     Access Code: [email or web address]  URL: [email or web address]  Date: 05/28/2024  Prepared by: Mariely Serrano    Manuals 7/9 7/16 7/18 7/23 7/25        Shoulder PROM (per protocol) 15' 15' 15' 15' 15''        Shoulder PA and in [facility] mobs gr IV 3x30\"ea                                      Neuro Re-Ed             Scap retraction 3x10 3x10 3x10 3x10 3x10        Deltoid isometrics             RTC isometrics 10x5\" ea 10x5\" ea 10x5\" ea 10x5\" ea 10x5\" ea                                                            Ther Ex             Pulley flexion 20x5\" 20x5\" 20x5\" 20x5\" 20x5\"        Pulley abduction 20x5\" 20x5\" 20x5\" 20x5\" 20x5\"        Cane ER AAROM 20x5\" 20x5\" 20x5\" 20x5\" 20x5\"        Supine cane flexion AAROM             Door AAROM ER 10x5\" 10x5\" 10x5\"           Standing cane flexion AAROM 20x5\" 20x5\" 20x5\"  20x5\" 20x5\"        Standing cane abduction AAROM 20x5\" 20x5\" 20x5\"  20x5\" 20x5\"        Standing shoulder flex to 90 AROM 2x10 2x10 2x10 2x10 2x10        Standing shoulder abd to 90 AROM 2x10 2x10 2x10 2x10 1x10        Standing shoulder ER AROM    2x10 2x10        Supine shoulder flex to 90 AROM     1x10        Ther Activity                                                    Gait Training                               "         Modalities             Ice

## 2024-07-30 ENCOUNTER — EVALUATION (OUTPATIENT)
Dept: PHYSICAL THERAPY | Facility: CLINIC | Age: 69
End: 2024-07-30
Payer: COMMERCIAL

## 2024-07-30 DIAGNOSIS — Z98.890 S/P RIGHT ROTATOR CUFF REPAIR: ICD-10-CM

## 2024-07-30 DIAGNOSIS — Z98.890 STATUS POST RIGHT ROTATOR CUFF REPAIR: Primary | ICD-10-CM

## 2024-07-30 PROCEDURE — 97110 THERAPEUTIC EXERCISES: CPT

## 2024-07-30 PROCEDURE — 97140 MANUAL THERAPY 1/> REGIONS: CPT

## 2024-07-30 PROCEDURE — 97112 NEUROMUSCULAR REEDUCATION: CPT

## 2024-07-30 NOTE — PROGRESS NOTES
Daily Note     Today's date: 2024  Patient name: Solomon Meyers  : 1955  MRN: [id number]  Referring provider: Enmanuel Tyson PA*  Dx:   Encounter Diagnosis     ICD-10-CM    1. Status post right rotator cuff repair  Z98.890       2. S/P right rotator cuff repair  Z98.890                      Subjective: Patient reports no change since last visit. Pain is minimal. He has limited function of RUE with ADLs.     Objective: See treatment diary below    AAROM:  Flexion to 95 degrees  Abduction to 90 degrees  ER at 30 degrees    Supine AROM (gravity eliminated)  Flexion: 110 deg  Abduction: 95 deg    Sidelying ER AROM: to 0 deg    PROM:  Flexion to 140 degrees  Abduction to 110 degrees  ER to 60 degrees    Assessment: Solomon is making very slow progressions. His PROM is below PLOF but is functional for ADLs however his severe weakness persists so AAROM and AROM is significantly limited. As today is 12 weeks from surgery, I added rows for periscapular activation. I did not add resisted TB RTC strengthening at this time, as he lacks full AROM at this point, but we will progress to this once able. His neuromuscular control is still impaired with significantly trunk rotation and UT compensations. Changed AROM to supine for gravity eliminated and better control of compensations. Solomon tolerated this very well,updated HEP provided. No change to status of progress towards functional goals since last evaluation, but objective measures have improved.     Plan: Goals  STGs (to be achieved within 2-4 weeks)  1. Pt will report no >2/10 pain w/ all activities to indicate a significant improvement in activity tolerance and pain. MET  2. Pt will be educated in and demonstrate good understanding of post-op protocol and only engage in activities appropriate for current phase while outside of PT. MET     LTGs (to be achieved within 6-8 weeks)   1. Pt will be I with HEP at d/c to promote PT carry-over. NOT YET MET  2. Pt will  meet FOTO predicted score.  NOT YET MET  3. Pt will improve R shoulder ROM to WNL to increase ease w/ functional mobility.  NOT YET MET  4. Pt will improve RUE strength to 4+/5 or greater to increase ease w/ functional mobility. NOT YET MET        Plan  Planned modality interventions: unattended electrical stimulation, ultrasound, traction, thermotherapy: hydrocollator packs, low level laser therapy and cryotherapy     Planned therapy interventions: manual therapy, neuromuscular re-education, therapeutic activities, therapeutic exercise and gait training     Frequency: 2x week  Duration in weeks: 12  Plan of Care beginning date: 7/30/2024  Plan of Care expiration date: 10/22/2024  Treatment plan discussed with: patient     Precautions: GERD, pituitary mass     DOS: 5/8/24 R RTCr (supraspinatus & infraspinatus), biceps tenodesis, acromioplasty   Next Protocol progression at Week 6: 6/19/24 -  no pulleys or canes until 6 weeks.     Post-Operative Rehabilitation Guidelines for Standard Rotator Cuff Tears     1-4 Weeks:   Sling Immobilization   Active ROM Elbow, Wrist and Hand   True Passive (ONLY) ROM Shoulder. NO ACTIVE MOTION.   Pendulums, Supine Elevation in Scapular plane, External Rotation to tolerance with arm at side. (emphasize ER, minimum goal 40°)   Scapular Stabilization exercises (sidelying)   Deltoid isometrics in neutral (submaximal) as ROM improves   No Pulley/Canes until 6 weeks post-op (these are active motions)     4-6 Weeks: 6/5-6/19  Discontinue sling use.   Begin Active Assist ROM and advance to Active as Tolerated   Elevation in scapular plane and external rotation as tolerated   No Internal rotation or behind back until 6wks.   Begin Cuff Isometrics at 6wks with arm at the side     6-12 Weeks:   Active Assist to Active ROM Shoulder As Tolerated   Elevation in scapular plane and external rotation to tolerance   Begin internal rotation as tolerated   Light stretching at end ranges   Cuff Isometrics  "with the arm at the side     3-12 Months:  Begin 7/31  Advance to full ROM as tolerated with passive stretching at end ranges   Advance strengthening as tolerated: isometrics ? bands ? light weights (1-5    lbs); 8-12 reps/2-3 sets per rotator cuff, deltoid, and scapular stabilizers   Limit strengthening 3x/week to avoid rotator cuff tendonitis   Begin eccentrically resisted motions, pylometrics (ex. Weighted ball toss),   proprioception (es. body blade)   Begin sports related rehab at 4 ½ months, including advanced conditioning   Return to throwing at 6 months   Throw from pitcher's mound at 9 months   Collision sports at 9 months   MMI is usually at 12 months post-op     Access Code: [email or web address]  URL: [email or web address]  Date: 05/28/2024  Prepared by: Mariely Serrano    Manuals 7/9 7/16 7/18 7/23 7/25 7/30       Shoulder PROM (per protocol) 15' 15' 15' 15' 15' 15'       Shoulder PA and in [facility] mobs gr IV 3x30\"ea                                      Neuro Re-Ed             RTC isometrics 10x5\" ea 10x5\" ea 10x5\" ea 10x5\" ea 10x5\" ea 10x5\" ea                                                           Ther Ex             Pulley flexion 20x5\" 20x5\" 20x5\" 20x5\" 20x5\" 20x5\"       Pulley abduction 20x5\" 20x5\" 20x5\" 20x5\" 20x5\" 20x5\"       Supine shoulder flex AROM      2x20       Supine shoulder abd to 90 AROM      2x20       Sidelying shoulder ER      2x20                    Ther Activity                                                    Gait Training                                       Modalities             Ice                             "

## 2024-08-01 ENCOUNTER — OFFICE VISIT (OUTPATIENT)
Dept: PHYSICAL THERAPY | Facility: CLINIC | Age: 69
End: 2024-08-01
Payer: COMMERCIAL

## 2024-08-01 DIAGNOSIS — Z98.890 S/P RIGHT ROTATOR CUFF REPAIR: ICD-10-CM

## 2024-08-01 DIAGNOSIS — Z98.890 STATUS POST RIGHT ROTATOR CUFF REPAIR: Primary | ICD-10-CM

## 2024-08-01 PROCEDURE — 97140 MANUAL THERAPY 1/> REGIONS: CPT

## 2024-08-01 PROCEDURE — 97110 THERAPEUTIC EXERCISES: CPT

## 2024-08-01 NOTE — PROGRESS NOTES
Daily Note     Today's date: 2024  Patient name: Solomon Meyers  : 1955  MRN: [id number]  Referring provider: Enmanuel Tyson PA*  Dx:   Encounter Diagnosis     ICD-10-CM    1. Status post right rotator cuff repair  Z98.890       2. S/P right rotator cuff repair  Z98.890           Start Time: 1200  Stop Time: 1240  Total time in clinic (min): 40 minutes    Subjective: Patient reports his R shoulder is getting better week by week. No complaints of pain or soreness prior to therapy.     Objective: See treatment diary below    Assessment: Patient's PROM was smoother today with no catching. PROM is making steady progress with stretching. Patient's AROM is progressing with supine mobility.     Plan: Continue per plan of care.      Precautions: GERD, pituitary mass     DOS: 24 R RTCr (supraspinatus & infraspinatus), biceps tenodesis, acromioplasty   Next Protocol progression at Week 6: 24 -  no pulleys or canes until 6 weeks.     Post-Operative Rehabilitation Guidelines for Standard Rotator Cuff Tears     1-4 Weeks:   Sling Immobilization   Active ROM Elbow, Wrist and Hand   True Passive (ONLY) ROM Shoulder. NO ACTIVE MOTION.   Pendulums, Supine Elevation in Scapular plane, External Rotation to tolerance with arm at side. (emphasize ER, minimum goal 40°)   Scapular Stabilization exercises (sidelying)   Deltoid isometrics in neutral (submaximal) as ROM improves   No Pulley/Canes until 6 weeks post-op (these are active motions)     4-6 Weeks: -  Discontinue sling use.   Begin Active Assist ROM and advance to Active as Tolerated   Elevation in scapular plane and external rotation as tolerated   No Internal rotation or behind back until 6wks.   Begin Cuff Isometrics at 6wks with arm at the side     6-12 Weeks:   Active Assist to Active ROM Shoulder As Tolerated   Elevation in scapular plane and external rotation to tolerance   Begin internal rotation as tolerated   Light stretching at end  "ranges   Cuff Isometrics with the arm at the side     3-12 Months:  Begin 7/31  Advance to full ROM as tolerated with passive stretching at end ranges   Advance strengthening as tolerated: isometrics ? bands ? light weights (1-5    lbs); 8-12 reps/2-3 sets per rotator cuff, deltoid, and scapular stabilizers   Limit strengthening 3x/week to avoid rotator cuff tendonitis   Begin eccentrically resisted motions, pylometrics (ex. Weighted ball toss),   proprioception (es. body blade)   Begin sports related rehab at 4 ½ months, including advanced conditioning   Return to throwing at 6 months   Throw from pitcher'[employer] at 9 months   Collision sports at 9 months   MMI is usually at 12 months post-op     Access Code: [email or web address]  URL: [email or web address]  Date: 05/28/2024  Prepared by: Mariely Serrano    Manuals 7/9 7/16 7/18 7/23 7/25 7/30 8/1      Shoulder PROM (per protocol) 15' 15' 15' 15' 15' 15' 15'      Shoulder PA and in [employer] mobs gr IV 3x30\"ea                                      Neuro Re-Ed             RTC isometrics 10x5\" ea 10x5\" ea 10x5\" ea 10x5\" ea 10x5\" ea 10x5\" ea 10x5\" ea                                                          Ther Ex             Pulley flexion 20x5\" 20x5\" 20x5\" 20x5\" 20x5\" 20x5\" 20x5\"      Pulley abduction 20x5\" 20x5\" 20x5\" 20x5\" 20x5\" 20x5\" 20x5\"      Supine shoulder flex AROM      2x20 2x20      Supine shoulder abd to 90 AROM      2x20 2x20      Sidelying shoulder ER      2x20   2x20                   Ther Activity                                                    Gait Training                                       Modalities             Ice                             "

## 2024-08-06 ENCOUNTER — OFFICE VISIT (OUTPATIENT)
Dept: PHYSICAL THERAPY | Facility: CLINIC | Age: 69
End: 2024-08-06
Payer: COMMERCIAL

## 2024-08-06 DIAGNOSIS — Z98.890 STATUS POST RIGHT ROTATOR CUFF REPAIR: Primary | ICD-10-CM

## 2024-08-06 DIAGNOSIS — Z98.890 S/P RIGHT ROTATOR CUFF REPAIR: ICD-10-CM

## 2024-08-06 PROCEDURE — 97112 NEUROMUSCULAR REEDUCATION: CPT

## 2024-08-06 PROCEDURE — 97110 THERAPEUTIC EXERCISES: CPT

## 2024-08-06 PROCEDURE — 97140 MANUAL THERAPY 1/> REGIONS: CPT

## 2024-08-06 NOTE — PROGRESS NOTES
Daily Note     Today's date: 2024  Patient name: Solomon Meyers  : 1955  MRN: [id number]  Referring provider: Enmanuel Tyson PA*  Dx:   Encounter Diagnosis     ICD-10-CM    1. Status post right rotator cuff repair  Z98.890       2. S/P right rotator cuff repair  Z98.890                      Subjective: Pt does very well with supine AROM.     Objective: See treatment diary below    Assessment: Solomon has a very hard time dissociating scapular elevation from shoulder elevation. Continue to focus on this as well as PROM and strength.     Plan: Continue per plan of care.      Precautions: GERD, pituitary mass     DOS: 24 R RTCr (supraspinatus & infraspinatus), biceps tenodesis, acromioplasty   Next Protocol progression at Week 6: 24 -  no pulleys or canes until 6 weeks.     Post-Operative Rehabilitation Guidelines for Standard Rotator Cuff Tears     1-4 Weeks:   Sling Immobilization   Active ROM Elbow, Wrist and Hand   True Passive (ONLY) ROM Shoulder. NO ACTIVE MOTION.   Pendulums, Supine Elevation in Scapular plane, External Rotation to tolerance with arm at side. (emphasize ER, minimum goal 40°)   Scapular Stabilization exercises (sidelying)   Deltoid isometrics in neutral (submaximal) as ROM improves   No Pulley/Canes until 6 weeks post-op (these are active motions)     4-6 Weeks: -  Discontinue sling use.   Begin Active Assist ROM and advance to Active as Tolerated   Elevation in scapular plane and external rotation as tolerated   No Internal rotation or behind back until 6wks.   Begin Cuff Isometrics at 6wks with arm at the side     6-12 Weeks:   Active Assist to Active ROM Shoulder As Tolerated   Elevation in scapular plane and external rotation to tolerance   Begin internal rotation as tolerated   Light stretching at end ranges   Cuff Isometrics with the arm at the side     3-12 Months:  Begin   Advance to full ROM as tolerated with passive stretching at end ranges   Advance  "strengthening as tolerated: isometrics ? bands ? light weights (1-5    lbs); 8-12 reps/2-3 sets per rotator cuff, deltoid, and scapular stabilizers   Limit strengthening 3x/week to avoid rotator cuff tendonitis   Begin eccentrically resisted motions, pylometrics (ex. Weighted ball toss),   proprioception (es. body blade)   Begin sports related rehab at 4 ½ months, including advanced conditioning   Return to throwing at 6 months   Throw from pitcher'"[employer] at 9 months   Collision sports at 9 months   MMI is usually at 12 months post-op     Access Code: [email or web address]  URL: [email or web address]  Date: 05/28/2024  Prepared by: Mariely Serrano    Manuals 7/9 7/16 7/18 7/23 7/25 7/30 8/1 8/6     Shoulder PROM (per protocol) 15' 15' 15' 15' 15' 15' 15' 15'     Shoulder PA and in [employer] mobs gr IV 3x30\"ea                                      Neuro Re-Ed             RTC isometrics 10x5\" ea 10x5\" ea 10x5\" ea 10x5\" ea 10x5\" ea 10x5\" ea 10x5\" ea 10x5\" ea     Shoulder flexed on wall with scap retraction and protusion        10x5\"                                            Ther Ex             Pulley flexion 20x5\" 20x5\" 20x5\" 20x5\" 20x5\" 20x5\" 20x5\" 20x5\"     Pulley abduction 20x5\" 20x5\" 20x5\" 20x5\" 20x5\" 20x5\" 20x5\" 20x5\"     Supine shoulder flex AROM      3x10 3x10 3x10     Supine shoulder abd to 90 AROM      3x10 3x10 3x10     Sidelying shoulder ER      3x10   3x10 3x10                  Ther Activity                                                    Gait Training                                       Modalities             Ice                             "

## 2024-08-08 ENCOUNTER — OFFICE VISIT (OUTPATIENT)
Dept: PHYSICAL THERAPY | Facility: CLINIC | Age: 69
End: 2024-08-08
Payer: COMMERCIAL

## 2024-08-08 DIAGNOSIS — Z98.890 S/P RIGHT ROTATOR CUFF REPAIR: ICD-10-CM

## 2024-08-08 DIAGNOSIS — Z98.890 STATUS POST RIGHT ROTATOR CUFF REPAIR: Primary | ICD-10-CM

## 2024-08-08 PROCEDURE — 97140 MANUAL THERAPY 1/> REGIONS: CPT

## 2024-08-08 PROCEDURE — 97110 THERAPEUTIC EXERCISES: CPT

## 2024-08-08 NOTE — PROGRESS NOTES
Daily Note     Today's date: 2024  Patient name: Solomon Meyers  : 1955  MRN: [id number]  Referring provider: Enmanuel Tyson PA*  Dx:   Encounter Diagnosis     ICD-10-CM    1. Status post right rotator cuff repair  Z98.890       2. S/P right rotator cuff repair  Z98.890           Start Time: 0958  Stop Time: 1038  Total time in clinic (min): 40 minutes    Subjective: Patient reports soreness following therapy. No complaints of pain.     Objective: See treatment diary below    Assessment: Solomon has a very hard time dissociating scapular elevation from shoulder elevation. Continue to focus on this as well as PROM and strength. Improvement with PROM noted. AROM still very challenging but improving in the supine position.     Plan: Continue per plan of care.      Precautions: GERD, pituitary mass     DOS: 24 R RTCr (supraspinatus & infraspinatus), biceps tenodesis, acromioplasty   Next Protocol progression at Week 6: 24 -  no pulleys or canes until 6 weeks.     Post-Operative Rehabilitation Guidelines for Standard Rotator Cuff Tears     1-4 Weeks:   Sling Immobilization   Active ROM Elbow, Wrist and Hand   True Passive (ONLY) ROM Shoulder. NO ACTIVE MOTION.   Pendulums, Supine Elevation in Scapular plane, External Rotation to tolerance with arm at side. (emphasize ER, minimum goal 40°)   Scapular Stabilization exercises (sidelying)   Deltoid isometrics in neutral (submaximal) as ROM improves   No Pulley/Canes until 6 weeks post-op (these are active motions)     4-6 Weeks: -  Discontinue sling use.   Begin Active Assist ROM and advance to Active as Tolerated   Elevation in scapular plane and external rotation as tolerated   No Internal rotation or behind back until 6wks.   Begin Cuff Isometrics at 6wks with arm at the side     6-12 Weeks:   Active Assist to Active ROM Shoulder As Tolerated   Elevation in scapular plane and external rotation to tolerance   Begin internal rotation as  "tolerated   Light stretching at end ranges   Cuff Isometrics with the arm at the side     3-12 Months:  Begin 7/31  Advance to full ROM as tolerated with passive stretching at end ranges   Advance strengthening as tolerated: isometrics ? bands ? light weights (1-5    lbs); 8-12 reps/2-3 sets per rotator cuff, deltoid, and scapular stabilizers   Limit strengthening 3x/week to avoid rotator cuff tendonitis   Begin eccentrically resisted motions, pylometrics (ex. Weighted ball toss),   proprioception (es. body blade)   Begin sports related rehab at 4 ½ months, including advanced conditioning   Return to throwing at 6 months   Throw from pitcher'[employer] at 9 months   Collision sports at 9 months   MMI is usually at 12 months post-op     Access Code: [email or web address]  URL: [email or web address]  Date: 05/28/2024  Prepared by: Mariely Serrano    Manuals 7/9 7/16 7/18 7/23 7/25 7/30 8/1 8/6 8/8    Shoulder PROM (per protocol) 15' 15' 15' 15' 15' 15' 15' 15' 15'    Shoulder PA and in [employer] mobs gr IV 3x30\"ea                                      Neuro Re-Ed             RTC isometrics 10x5\" ea 10x5\" ea 10x5\" ea 10x5\" ea 10x5\" ea 10x5\" ea 10x5\" ea 10x5\" ea 10x5\" ea    Shoulder flexed on wall with scap retraction and protusion        10x5\" 10x5\"                                           Ther Ex             Pulley flexion 20x5\" 20x5\" 20x5\" 20x5\" 20x5\" 20x5\" 20x5\" 20x5\" 20x5\"    Pulley abduction 20x5\" 20x5\" 20x5\" 20x5\" 20x5\" 20x5\" 20x5\" 20x5\" 20x5\"    Supine shoulder flex AROM      3x10 3x10 3x10 3x10    Supine shoulder abd to 90 AROM      3x10 3x10 3x10 3x10    Sidelying shoulder ER      3x10   3x10 3x10 3x10                 Ther Activity                                                    Gait Training                                       Modalities             Ice                             "

## 2024-08-13 ENCOUNTER — OFFICE VISIT (OUTPATIENT)
Dept: PHYSICAL THERAPY | Facility: CLINIC | Age: 69
End: 2024-08-13
Payer: COMMERCIAL

## 2024-08-13 DIAGNOSIS — Z98.890 STATUS POST RIGHT ROTATOR CUFF REPAIR: Primary | ICD-10-CM

## 2024-08-13 DIAGNOSIS — Z98.890 S/P RIGHT ROTATOR CUFF REPAIR: ICD-10-CM

## 2024-08-13 PROCEDURE — 97140 MANUAL THERAPY 1/> REGIONS: CPT

## 2024-08-13 PROCEDURE — 97112 NEUROMUSCULAR REEDUCATION: CPT

## 2024-08-13 PROCEDURE — 97110 THERAPEUTIC EXERCISES: CPT

## 2024-08-13 NOTE — PROGRESS NOTES
Daily Note     Today's date: 2024  Patient name: Solomon Meyers  : 1955  MRN: [id number]  Referring provider: Enmanuel Tyson PA*  Dx:   Encounter Diagnosis     ICD-10-CM    1. Status post right rotator cuff repair  Z98.890       2. S/P right rotator cuff repair  Z98.890                      Subjective: Patient reports no change since last visit.     Objective: See treatment diary below    Assessment: Continue to work on dissociation between scapulothoracic mechanics and glenohumeral joint mechanics.     Plan: Continue per plan of care.      Precautions: GERD, pituitary mass     DOS: 24 R RTCr (supraspinatus & infraspinatus), biceps tenodesis, acromioplasty   Next Protocol progression at Week 6: 24 -  no pulleys or canes until 6 weeks.     Post-Operative Rehabilitation Guidelines for Standard Rotator Cuff Tears     1-4 Weeks:   Sling Immobilization   Active ROM Elbow, Wrist and Hand   True Passive (ONLY) ROM Shoulder. NO ACTIVE MOTION.   Pendulums, Supine Elevation in Scapular plane, External Rotation to tolerance with arm at side. (emphasize ER, minimum goal 40°)   Scapular Stabilization exercises (sidelying)   Deltoid isometrics in neutral (submaximal) as ROM improves   No Pulley/Canes until 6 weeks post-op (these are active motions)     4-6 Weeks: -  Discontinue sling use.   Begin Active Assist ROM and advance to Active as Tolerated   Elevation in scapular plane and external rotation as tolerated   No Internal rotation or behind back until 6wks.   Begin Cuff Isometrics at 6wks with arm at the side     6-12 Weeks:   Active Assist to Active ROM Shoulder As Tolerated   Elevation in scapular plane and external rotation to tolerance   Begin internal rotation as tolerated   Light stretching at end ranges   Cuff Isometrics with the arm at the side     3-12 Months:  Begin   Advance to full ROM as tolerated with passive stretching at end ranges   Advance strengthening as tolerated:  "isometrics ? bands ? light weights (1-5    lbs); 8-12 reps/2-3 sets per rotator cuff, deltoid, and scapular stabilizers   Limit strengthening 3x/week to avoid rotator cuff tendonitis   Begin eccentrically resisted motions, pylometrics (ex. Weighted ball toss),   proprioception (es. body blade)   Begin sports related rehab at 4 ½ months, including advanced conditioning   Return to throwing at 6 months   Throw from [employer] at 9 months   Collision sports at 9 months   MMI is usually at 12 months post-op     Access Code: [email or web address]  URL: [email or web address]  Date: 05/28/2024  Prepared by: Mariely Serrano    Manuals 7/9 7/16 7/18 7/23 7/25 7/30 8/1 8/6 8/8 8/13   Shoulder PROM (per protocol) 15' 15' 15' 15' 15' 15' 15' 15' 15' 15'   Shoulder PA and in [employer] mobs gr IV 3x30\"ea                                      Neuro Re-Ed             RTC isometrics 10x5\" ea 10x5\" ea 10x5\" ea 10x5\" ea 10x5\" ea 10x5\" ea 10x5\" ea 10x5\" ea 10x5\" ea 10x5\" ea   Shoulder flexed on wall with scap retraction and protusion        10x5\" 10x5\" 10x5\" ea                                          Ther Ex             Pulley flexion 20x5\" 20x5\" 20x5\" 20x5\" 20x5\" 20x5\" 20x5\" 20x5\" 20x5\" 20x5\"   Pulley abduction 20x5\" 20x5\" 20x5\" 20x5\" 20x5\" 20x5\" 20x5\" 20x5\" 20x5\" 20x5\"   Supine shoulder flex AROM      3x10 3x10 3x10 3x10 3x10   Supine shoulder abd to 90 AROM      3x10 3x10 3x10 3x10 3x10   Sidelying shoulder ER      3x10   3x10 3x10 3x10 3x10                Ther Activity                                                    Gait Training                                       Modalities             Ice                             "

## 2024-08-15 ENCOUNTER — OFFICE VISIT (OUTPATIENT)
Dept: PHYSICAL THERAPY | Facility: CLINIC | Age: 69
End: 2024-08-15
Payer: COMMERCIAL

## 2024-08-15 DIAGNOSIS — Z98.890 STATUS POST RIGHT ROTATOR CUFF REPAIR: Primary | ICD-10-CM

## 2024-08-15 DIAGNOSIS — Z98.890 S/P RIGHT ROTATOR CUFF REPAIR: ICD-10-CM

## 2024-08-15 PROCEDURE — 97110 THERAPEUTIC EXERCISES: CPT

## 2024-08-15 PROCEDURE — 97112 NEUROMUSCULAR REEDUCATION: CPT

## 2024-08-15 NOTE — PROGRESS NOTES
Daily Note     Today's date: 8/15/2024  Patient name: Solomon Meyers  : 1955  MRN: [id number]  Referring provider: Enmanuel Tyson PA*  Dx:   Encounter Diagnosis     ICD-10-CM    1. Status post right rotator cuff repair  Z98.890       2. S/P right rotator cuff repair  Z98.890             Start Time: 1030  Stop Time: 1110  Total time in clinic (min): 40 minutes    Subjective: Patient has no complaints of pain. Notes his motion is slowly getting better.     Objective: See treatment diary below    Assessment: Continue to work on dissociation between scapulothoracic mechanics and glenohumeral joint mechanics.     Plan: Continue per plan of care.      Precautions: GERD, pituitary mass     DOS: 24 R RTCr (supraspinatus & infraspinatus), biceps tenodesis, acromioplasty   Next Protocol progression at Week 6: 24 -  no pulleys or canes until 6 weeks.     Post-Operative Rehabilitation Guidelines for Standard Rotator Cuff Tears     1-4 Weeks:   Sling Immobilization   Active ROM Elbow, Wrist and Hand   True Passive (ONLY) ROM Shoulder. NO ACTIVE MOTION.   Pendulums, Supine Elevation in Scapular plane, External Rotation to tolerance with arm at side. (emphasize ER, minimum goal 40°)   Scapular Stabilization exercises (sidelying)   Deltoid isometrics in neutral (submaximal) as ROM improves   No Pulley/Canes until 6 weeks post-op (these are active motions)     4-6 Weeks: -  Discontinue sling use.   Begin Active Assist ROM and advance to Active as Tolerated   Elevation in scapular plane and external rotation as tolerated   No Internal rotation or behind back until 6wks.   Begin Cuff Isometrics at 6wks with arm at the side     6-12 Weeks:   Active Assist to Active ROM Shoulder As Tolerated   Elevation in scapular plane and external rotation to tolerance   Begin internal rotation as tolerated   Light stretching at end ranges   Cuff Isometrics with the arm at the side     3-12 Months:  Begin   Advance to  "full ROM as tolerated with passive stretching at end ranges   Advance strengthening as tolerated: isometrics ? bands ? light weights (1-5    lbs); 8-12 reps/2-3 sets per rotator cuff, deltoid, and scapular stabilizers   Limit strengthening 3x/week to avoid rotator cuff tendonitis   Begin eccentrically resisted motions, pylometrics (ex. Weighted ball toss),   proprioception (es. body blade)   Begin sports related rehab at 4 ½ months, including advanced conditioning   Return to throwing at 6 months   Throw from pitcher'[employer] at 9 months   Collision sports at 9 months   MMI is usually at 12 months post-op     Access Code: [email or web address]  URL: [email or web address]  Date: 05/28/2024  Prepared by: Mariely Serrano    Manuals 7/9 7/16 7/18 7/23 7/25 7/30 8/1 8/6 8/8 8/13   Shoulder PROM (per protocol) 15' 15' 15' 15' 15' 15' 15' 15' 15' 15'   Shoulder PA and in [employer] mobs gr IV 3x30\"ea                                      Neuro Re-Ed             RTC isometrics 10x5\" ea 10x5\" ea 10x5\" ea 10x5\" ea 10x5\" ea 10x5\" ea 10x5\" ea 10x5\" ea 10x5\" ea 10x5\" ea   Shoulder flexed on wall with scap retraction and protusion        10x5\" 10x5\" 10x5\" ea                                          Ther Ex             Pulley flexion 20x5\" 20x5\" 20x5\" 20x5\" 20x5\" 20x5\" 20x5\" 20x5\" 20x5\" 20x5\"   Pulley abduction 20x5\" 20x5\" 20x5\" 20x5\" 20x5\" 20x5\" 20x5\" 20x5\" 20x5\" 20x5\"   Supine shoulder flex AROM      3x10 3x10 3x10 3x10 3x10   Supine shoulder abd to 90 AROM      3x10 3x10 3x10 3x10 3x10   Sidelying shoulder ER      3x10   3x10 3x10 3x10 3x10                Ther Activity                                                    Gait Training                                       Modalities             Ice                             "

## 2024-08-20 ENCOUNTER — OFFICE VISIT (OUTPATIENT)
Dept: PHYSICAL THERAPY | Facility: CLINIC | Age: 69
End: 2024-08-20
Payer: COMMERCIAL

## 2024-08-20 ENCOUNTER — OFFICE VISIT (OUTPATIENT)
Dept: OBGYN CLINIC | Facility: CLINIC | Age: 69
End: 2024-08-20
Payer: COMMERCIAL

## 2024-08-20 VITALS
HEART RATE: 66 BPM | BODY MASS INDEX: 26.67 KG/M2 | WEIGHT: 176 LBS | SYSTOLIC BLOOD PRESSURE: 145 MMHG | HEIGHT: 68 IN | DIASTOLIC BLOOD PRESSURE: 88 MMHG

## 2024-08-20 DIAGNOSIS — Z98.890 STATUS POST RIGHT ROTATOR CUFF REPAIR: Primary | ICD-10-CM

## 2024-08-20 DIAGNOSIS — Z98.890 S/P RIGHT ROTATOR CUFF REPAIR: ICD-10-CM

## 2024-08-20 PROCEDURE — 97140 MANUAL THERAPY 1/> REGIONS: CPT

## 2024-08-20 PROCEDURE — 97110 THERAPEUTIC EXERCISES: CPT

## 2024-08-20 PROCEDURE — 99213 OFFICE O/P EST LOW 20 MIN: CPT | Performed by: ORTHOPAEDIC SURGERY

## 2024-08-20 PROCEDURE — 97112 NEUROMUSCULAR REEDUCATION: CPT

## 2024-08-20 NOTE — PROGRESS NOTES
Daily Note     Today's date: 2024  Patient name: Solomon Meyers  : 1955  MRN: [id number]  Referring provider: Enmanuel Tyson PA*  Dx:   Encounter Diagnosis     ICD-10-CM    1. Status post right rotator cuff repair  Z98.890       2. S/P right rotator cuff repair  Z98.890                        Subjective: Patient reports he followed up with the doctor and the doctor seemed pleased with current progress. Instructed to continue to work on strength and ROM.     Objective: See treatment diary below    Assessment: Added resisted strengthening to further progress strengthening even though solomon does not have full normal ROM or neuromuscular control yet.     Plan: Continue per plan of care.      Precautions: GERD, pituitary mass     DOS: 24 R RTCr (supraspinatus & infraspinatus), biceps tenodesis, acromioplasty   Next Protocol progression at Week 6: 24 -  no pulleys or canes until 6 weeks.     Post-Operative Rehabilitation Guidelines for Standard Rotator Cuff Tears     1-4 Weeks:   Sling Immobilization   Active ROM Elbow, Wrist and Hand   True Passive (ONLY) ROM Shoulder. NO ACTIVE MOTION.   Pendulums, Supine Elevation in Scapular plane, External Rotation to tolerance with arm at side. (emphasize ER, minimum goal 40°)   Scapular Stabilization exercises (sidelying)   Deltoid isometrics in neutral (submaximal) as ROM improves   No Pulley/Canes until 6 weeks post-op (these are active motions)     4-6 Weeks: -  Discontinue sling use.   Begin Active Assist ROM and advance to Active as Tolerated   Elevation in scapular plane and external rotation as tolerated   No Internal rotation or behind back until 6wks.   Begin Cuff Isometrics at 6wks with arm at the side     6-12 Weeks:   Active Assist to Active ROM Shoulder As Tolerated   Elevation in scapular plane and external rotation to tolerance   Begin internal rotation as tolerated   Light stretching at end ranges   Cuff Isometrics with the arm at  "the side     3-12 Months:  Begin 7/31  Advance to full ROM as tolerated with passive stretching at end ranges   Advance strengthening as tolerated: isometrics ? bands ? light weights (1-5    lbs); 8-12 reps/2-3 sets per rotator cuff, deltoid, and scapular stabilizers   Limit strengthening 3x/week to avoid rotator cuff tendonitis   Begin eccentrically resisted motions, pylometrics (ex. Weighted ball toss),   proprioception (es. body blade)   Begin sports related rehab at 4 ½ months, including advanced conditioning   Return to throwing at 6 months   Throw from pitcher's mound at 9 months   Collision sports at 9 months   MMI is usually at 12 months post-op     Access Code: [email or web address]  URL: [email or web address]  Date: 05/28/2024  Prepared by: Mariely Serrano    Manuals 8/20 7/30 8/1 8/6 8/8 8/13   Shoulder PROM (per protocol) 15'     15' 15' 15' 15' 15'   Shoulder PA and in [employer] mobs gr IV                                       Neuro Re-Ed             RTC isometrics 10x5\" ea     10x5\" ea 10x5\" ea 10x5\" ea 10x5\" ea 10x5\" ea   Shoulder flexed on wall with scap retraction and protusion 10x5\" ea       10x5\" 10x5\" 10x5\" ea   Seated shoulder flexion from table without UT compensation 1x10                                      Ther Ex             Pulley flexion 20x5\"     20x5\" 20x5\" 20x5\" 20x5\" 20x5\"   Pulley abduction 20x5\"     20x5\" 20x5\" 20x5\" 20x5\" 20x5\"   Supine shoulder flex AROM 3x10     3x10 3x10 3x10 3x10 3x10   Supine shoulder abd to 90 AROM 3x10     3x10 3x10 3x10 3x10 3x10   Sidelying shoulder ER 3x10     3x10   3x10 3x10 3x10 3x10   Rows Blue 3x10            TB ER Y 3x10            TB IR Y 3x10            Ther Activity                                                    Gait Training                                       Modalities             Ice                             "

## 2024-08-20 NOTE — PROGRESS NOTES
Subjective   CHIEF COMPLAINT/REASON FOR VISIT  Solomon Meyers is a 69 y.o. male who presents for  3.5 month post op follow-up after Repair Rotator Cuff  Arthroscopic - Right, Arthroscopic Biceps Tenodesis - Right, and Acromioplasty - Right on 5/8/2024     HISTORY OF PRESENT ILLNESS  Overall, he feels things are going well and progressing appropriately. He has been following the post-operative rehabilitation regimen without difficultly. Currently, he is doing well.  He said he still having a slightly harder time with his range of motion.  He denies any pain at this time.  He has been doing physical therapy which she feels is helping.  He feels he is making slow but steady progress.    Objective   PHYSICAL EXAMINATION  General:   Well-appearing  No acute distress  Appears stated age    right Shoulder  Skin is intact with no erythema, warmth or drainage. Surgical incisions well healed.  Negative tenderness to palpation over the acromioclavicular joint  Negative tenderness to palpation over the long head of the biceps tendon  Shoulder effusion none present  Shoulder abduction 70 / 180  Shoulder forward flexion 70 / 180  Shoulder internal rotation T12 / T7  Supraspinatus/ABD 2/5   Infraspinatus/ER 3/5   Subscapularis/IR 5/5   Negative Belly Press/SS  Negative Neer  Negative Bansal  Negative O'briens  Motor strength intact distally  Sensation to light touch is normal in the axillary, radial, ulnar, and median nerve distributions.  Fingers warm and perfused     Assessment & Plan   1. Status Post Repair Rotator Cuff  Arthroscopic - Right, Arthroscopic Biceps Tenodesis - Right, and Acromioplasty - Right    He is doing slower than expected after surgery but making progress. Encouraged to continue working on rotator cuff rehabilitation protocol with physical therapy. Recommended to continue to avoid heavy lifting.    We will plan to see him back at the 6 month post-operative [place]. If any issues, questions, or concerns arise  between now and the next appointment, we have encouraged the patient contact our team.    Scribe Attestation      I,:  Kavon Geiger PA-C am acting as a scribe while in the presence of the attending physician.:       I,:  Gene Morrell MD personally performed the services described in this documentation    as scribed in my presence.:

## 2024-08-22 ENCOUNTER — OFFICE VISIT (OUTPATIENT)
Dept: PHYSICAL THERAPY | Facility: CLINIC | Age: 69
End: 2024-08-22
Payer: COMMERCIAL

## 2024-08-22 DIAGNOSIS — Z98.890 STATUS POST RIGHT ROTATOR CUFF REPAIR: Primary | ICD-10-CM

## 2024-08-22 DIAGNOSIS — Z98.890 S/P RIGHT ROTATOR CUFF REPAIR: ICD-10-CM

## 2024-08-22 PROCEDURE — 97140 MANUAL THERAPY 1/> REGIONS: CPT

## 2024-08-22 PROCEDURE — 97112 NEUROMUSCULAR REEDUCATION: CPT

## 2024-08-22 PROCEDURE — 97110 THERAPEUTIC EXERCISES: CPT

## 2024-08-22 NOTE — PROGRESS NOTES
Daily Note     Today's date: 2024  Patient name: Solomon Meyers  : 1955  MRN: [id number]  Referring provider: Enmanuel Tyson PA*  Dx:   Encounter Diagnosis     ICD-10-CM    1. Status post right rotator cuff repair  Z98.890       2. S/P right rotator cuff repair  Z98.890             Start Time: 1030  Stop Time: 1115  Total time in clinic (min): 45 minutes    Subjective: Patient reports having no trouble following the addition of strengthening last visit.     Objective: See treatment diary below    Assessment: Tactile cueing require to maintain a good shoulder/scap position to enhance isolation of his muscular contraction.     Plan: Continue per plan of care.      Precautions: GERD, pituitary mass     DOS: 24 R RTCr (supraspinatus & infraspinatus), biceps tenodesis, acromioplasty   Next Protocol progression at Week 6: 24 -  no pulleys or canes until 6 weeks.     Post-Operative Rehabilitation Guidelines for Standard Rotator Cuff Tears     1-4 Weeks:   Sling Immobilization   Active ROM Elbow, Wrist and Hand   True Passive (ONLY) ROM Shoulder. NO ACTIVE MOTION.   Pendulums, Supine Elevation in Scapular plane, External Rotation to tolerance with arm at side. (emphasize ER, minimum goal 40°)   Scapular Stabilization exercises (sidelying)   Deltoid isometrics in neutral (submaximal) as ROM improves   No Pulley/Canes until 6 weeks post-op (these are active motions)     4-6 Weeks: -  Discontinue sling use.   Begin Active Assist ROM and advance to Active as Tolerated   Elevation in scapular plane and external rotation as tolerated   No Internal rotation or behind back until 6wks.   Begin Cuff Isometrics at 6wks with arm at the side     6-12 Weeks:   Active Assist to Active ROM Shoulder As Tolerated   Elevation in scapular plane and external rotation to tolerance   Begin internal rotation as tolerated   Light stretching at end ranges   Cuff Isometrics with the arm at the side     3-12 Months:   "Begin 7/31  Advance to full ROM as tolerated with passive stretching at end ranges   Advance strengthening as tolerated: isometrics ? bands ? light weights (1-5    lbs); 8-12 reps/2-3 sets per rotator cuff, deltoid, and scapular stabilizers   Limit strengthening 3x/week to avoid rotator cuff tendonitis   Begin eccentrically resisted motions, pylometrics (ex. Weighted ball toss),   proprioception (es. body blade)   Begin sports related rehab at 4 ½ months, including advanced conditioning   Return to throwing at 6 months   Throw from [employer] at 9 months   Collision sports at 9 months   MMI is usually at 12 months post-op     Access Code: [email or web address]  URL: [email or web address]  Date: 05/28/2024  Prepared by: Mariely Serrano    Manuals 8/20 8/22 7/30 8/1 8/6 8/8 8/13   Shoulder PROM (per protocol) 15' 15'    15' 15' 15' 15' 15'   Shoulder PA and in GHJ mobs gr IV                                       Neuro Re-Ed             RTC isometrics 10x5\" ea 10x5\" ea    10x5\" ea 10x5\" ea 10x5\" ea 10x5\" ea 10x5\" ea   Shoulder flexed on wall with scap retraction and protusion 10x5\" ea 10x5\" ea      10x5\" 10x5\" 10x5\" ea   Seated shoulder flexion from table without UT compensation 1x10 1x10                                     Ther Ex             Pulley flexion 20x5\" 20x5\"    20x5\" 20x5\" 20x5\" 20x5\" 20x5\"   Pulley abduction 20x5\" 20x5\"    20x5\" 20x5\" 20x5\" 20x5\" 20x5\"   Supine shoulder flex AROM 3x10 3x10    3x10 3x10 3x10 3x10 3x10   Supine shoulder abd to 90 AROM 3x10 3x10    3x10 3x10 3x10 3x10 3x10   Sidelying shoulder ER 3x10 3x10    3x10   3x10 3x10 3x10 3x10   Rows Blue 3x10 Blue 3x10           TB ER Y 3x10 Y   3x10           TB IR Y 3x10 Y   3x10           Ther Activity                                                    Gait Training                                       Modalities             Ice                             "

## 2024-08-27 ENCOUNTER — OFFICE VISIT (OUTPATIENT)
Dept: PHYSICAL THERAPY | Facility: CLINIC | Age: 69
End: 2024-08-27
Payer: COMMERCIAL

## 2024-08-27 DIAGNOSIS — Z98.890 STATUS POST RIGHT ROTATOR CUFF REPAIR: Primary | ICD-10-CM

## 2024-08-27 DIAGNOSIS — Z98.890 S/P RIGHT ROTATOR CUFF REPAIR: ICD-10-CM

## 2024-08-27 PROCEDURE — 97140 MANUAL THERAPY 1/> REGIONS: CPT

## 2024-08-27 PROCEDURE — 97110 THERAPEUTIC EXERCISES: CPT

## 2024-08-27 PROCEDURE — 97112 NEUROMUSCULAR REEDUCATION: CPT

## 2024-08-27 NOTE — PROGRESS NOTES
Daily Note     Today's date: 2024  Patient name: Solomon Meyers  : 1955  MRN: [id number]  Referring provider: Enmanuel Tyson PA*  Dx:   Encounter Diagnosis     ICD-10-CM    1. Status post right rotator cuff repair  Z98.890       2. S/P right rotator cuff repair  Z98.890                        Subjective: Patient reports no pain or soreness from strengthening.     Objective: See treatment diary below    Assessment: Pt unable to perform AROM against gravity without UT compensation. So added resistance for scapular depression and cued scapular depression with shoulder elevation supine gravity eliminated. Assess response nv.     Plan: Continue per plan of care.      Precautions: GERD, pituitary mass     DOS: 24 R RTCr (supraspinatus & infraspinatus), biceps tenodesis, acromioplasty   Next Protocol progression at Week 6: 24 -  no pulleys or canes until 6 weeks.     Post-Operative Rehabilitation Guidelines for Standard Rotator Cuff Tears     1-4 Weeks:   Sling Immobilization   Active ROM Elbow, Wrist and Hand   True Passive (ONLY) ROM Shoulder. NO ACTIVE MOTION.   Pendulums, Supine Elevation in Scapular plane, External Rotation to tolerance with arm at side. (emphasize ER, minimum goal 40°)   Scapular Stabilization exercises (sidelying)   Deltoid isometrics in neutral (submaximal) as ROM improves   No Pulley/Canes until 6 weeks post-op (these are active motions)     4-6 Weeks: -  Discontinue sling use.   Begin Active Assist ROM and advance to Active as Tolerated   Elevation in scapular plane and external rotation as tolerated   No Internal rotation or behind back until 6wks.   Begin Cuff Isometrics at 6wks with arm at the side     6-12 Weeks:   Active Assist to Active ROM Shoulder As Tolerated   Elevation in scapular plane and external rotation to tolerance   Begin internal rotation as tolerated   Light stretching at end ranges   Cuff Isometrics with the arm at the side     3-12 Months:   "Begin 7/31  Advance to full ROM as tolerated with passive stretching at end ranges   Advance strengthening as tolerated: isometrics ? bands ? light weights (1-5    lbs); 8-12 reps/2-3 sets per rotator cuff, deltoid, and scapular stabilizers   Limit strengthening 3x/week to avoid rotator cuff tendonitis   Begin eccentrically resisted motions, pylometrics (ex. Weighted ball toss),   proprioception (es. body blade)   Begin sports related rehab at 4 ½ months, including advanced conditioning   Return to throwing at 6 months   Throw from [employer] at 9 months   Collision sports at 9 months   MMI is usually at 12 months post-op     Access Code: [email or web address]  URL: [email or web address]  Date: 05/28/2024  Prepared by: Mariely Serrano    Manuals 8/20 8/22 8/27 7/30 8/1 8/6 8/8 8/13   Shoulder PROM (per protocol) 15' 15' 15'   15' 15' 15' 15' 15'   Shoulder PA and inf GHJ mobs gr IV   5'                                    Neuro Re-Ed             RTC isometrics 10x5\" ea 10x5\" ea    10x5\" ea 10x5\" ea 10x5\" ea 10x5\" ea 10x5\" ea   Shoulder flexed on wall with scap depression and protusion 10x5\" ea 10x5\" ea      10x5\" 10x5\" 10x5\" ea   Supine resisted scap drepression with shoulder elevation   1x10 w/ SPC AAROM, 1x10 AROM                                    Ther Ex             Pulley flexion 20x5\" 20x5\" 20x5\"   20x5\" 20x5\" 20x5\" 20x5\" 20x5\"   Pulley abduction 20x5\" 20x5\" 20x5\"   20x5\" 20x5\" 20x5\" 20x5\" 20x5\"   Supine shoulder flex AROM 3x10 3x10 3x10   3x10 3x10 3x10 3x10 3x10   Supine shoulder abd to 90 AROM 3x10 3x10 3x10   3x10 3x10 3x10 3x10 3x10   Sidelying shoulder ER 3x10 3x10 3x10   3x10   3x10 3x10 3x10 3x10   Rows Blue 3x10 Blue 3x10 Blue 3x10          TB ER Y 3x10 Y   3x10 Y   3x10          TB IR Y 3x10 Y   3x10 Y   3x10          Ther Activity                                                    Gait Training                                       Modalities             Ice                             "

## 2024-08-29 ENCOUNTER — OFFICE VISIT (OUTPATIENT)
Dept: PHYSICAL THERAPY | Facility: CLINIC | Age: 69
End: 2024-08-29
Payer: COMMERCIAL

## 2024-08-29 DIAGNOSIS — Z98.890 S/P RIGHT ROTATOR CUFF REPAIR: ICD-10-CM

## 2024-08-29 DIAGNOSIS — Z98.890 STATUS POST RIGHT ROTATOR CUFF REPAIR: Primary | ICD-10-CM

## 2024-08-29 PROCEDURE — 97112 NEUROMUSCULAR REEDUCATION: CPT

## 2024-08-29 PROCEDURE — 97110 THERAPEUTIC EXERCISES: CPT

## 2024-08-29 NOTE — PROGRESS NOTES
Daily Note     Today's date: 2024  Patient name: Solomon Meyers  : 1955  MRN: [id number]  Referring provider: Enmanuel Tyson PA*  Dx:   Encounter Diagnosis     ICD-10-CM    1. Status post right rotator cuff repair  Z98.890       2. S/P right rotator cuff repair  Z98.890           Start Time: 1100  Stop Time: 1145  Total time in clinic (min): 45 minutes    Subjective: Patient reports feeling sore for a day following last visit.     Objective: See treatment diary below    Assessment: Pt unable to perform AROM against gravity without UT compensation. So added resistance for scapular depression and cued scapular depression with shoulder elevation supine gravity eliminated. Assess response nv.     Plan: Continue per plan of care.      Precautions: GERD, pituitary mass     DOS: 24 R RTCr (supraspinatus & infraspinatus), biceps tenodesis, acromioplasty   Next Protocol progression at Week 6: 24 -  no pulleys or canes until 6 weeks.     Post-Operative Rehabilitation Guidelines for Standard Rotator Cuff Tears     1-4 Weeks:   Sling Immobilization   Active ROM Elbow, Wrist and Hand   True Passive (ONLY) ROM Shoulder. NO ACTIVE MOTION.   Pendulums, Supine Elevation in Scapular plane, External Rotation to tolerance with arm at side. (emphasize ER, minimum goal 40°)   Scapular Stabilization exercises (sidelying)   Deltoid isometrics in neutral (submaximal) as ROM improves   No Pulley/Canes until 6 weeks post-op (these are active motions)     4-6 Weeks: -  Discontinue sling use.   Begin Active Assist ROM and advance to Active as Tolerated   Elevation in scapular plane and external rotation as tolerated   No Internal rotation or behind back until 6wks.   Begin Cuff Isometrics at 6wks with arm at the side     6-12 Weeks:   Active Assist to Active ROM Shoulder As Tolerated   Elevation in scapular plane and external rotation to tolerance   Begin internal rotation as tolerated   Light stretching at end  "ranges   Cuff Isometrics with the arm at the side     3-12 Months:  Begin 7/31  Advance to full ROM as tolerated with passive stretching at end ranges   Advance strengthening as tolerated: isometrics ? bands ? light weights (1-5    lbs); 8-12 reps/2-3 sets per rotator cuff, deltoid, and scapular stabilizers   Limit strengthening 3x/week to avoid rotator cuff tendonitis   Begin eccentrically resisted motions, pylometrics (ex. Weighted ball toss),   proprioception (es. body blade)   Begin sports related rehab at 4 ½ months, including advanced conditioning   Return to throwing at 6 months   Throw from [employer] at 9 months   Collision sports at 9 months   MMI is usually at 12 months post-op     Access Code: [email or web address]  URL: [email or web address]  Date: 05/28/2024  Prepared by: Mariely Serrano    Manuals 8/20 8/22 8/27 8/29 8/6 8/8 8/13   Shoulder PROM (per protocol) 15' 15' 15' 15'    15' 15' 15'   Shoulder PA and inf GHJ mobs gr IV   5'                                    Neuro Re-Ed             RTC isometrics 10x5\" ea 10x5\" ea      10x5\" ea 10x5\" ea 10x5\" ea   Shoulder flexed on wall with scap depression and protusion 10x5\" ea 10x5\" ea      10x5\" 10x5\" 10x5\" ea   Supine resisted scap drepression with shoulder elevation   1x10 w/ SPC AAROM, 1x10 AROM 1x10 w/ SPC AAROM, 1x10 AROM                                   Ther Ex             Pulley flexion 20x5\" 20x5\" 20x5\" 20x5\"    20x5\" 20x5\" 20x5\"   Pulley abduction 20x5\" 20x5\" 20x5\" 20x5\"    20x5\" 20x5\" 20x5\"   Supine shoulder flex AROM 3x10 3x10 3x10 3x10    3x10 3x10 3x10   Supine shoulder abd to 90 AROM 3x10 3x10 3x10 3x10    3x10 3x10 3x10   Sidelying shoulder ER 3x10 3x10 3x10 3x10    3x10 3x10 3x10   Rows Blue 3x10 Blue 3x10 Blue 3x10 Blue  3x10         TB ER Y 3x10 Y   3x10 Y   3x10 Y  3x10         TB IR Y 3x10 Y   3x10 Y   3x10 Y  3x10         Ther Activity                                                    Gait Training                            "            Modalities             Ice

## 2024-09-03 ENCOUNTER — OFFICE VISIT (OUTPATIENT)
Dept: PHYSICAL THERAPY | Facility: CLINIC | Age: 69
End: 2024-09-03
Payer: COMMERCIAL

## 2024-09-03 DIAGNOSIS — Z98.890 STATUS POST RIGHT ROTATOR CUFF REPAIR: Primary | ICD-10-CM

## 2024-09-03 DIAGNOSIS — Z98.890 S/P RIGHT ROTATOR CUFF REPAIR: ICD-10-CM

## 2024-09-03 PROCEDURE — 97112 NEUROMUSCULAR REEDUCATION: CPT

## 2024-09-03 PROCEDURE — 97140 MANUAL THERAPY 1/> REGIONS: CPT

## 2024-09-03 PROCEDURE — 97110 THERAPEUTIC EXERCISES: CPT

## 2024-09-03 NOTE — PROGRESS NOTES
Daily Note     Today's date: 9/3/2024  Patient name: Solomon Meyers  : 1955  MRN: [id number]  Referring provider: Enmanuel Tyson PA*  Dx:   Encounter Diagnosis     ICD-10-CM    1. Status post right rotator cuff repair  Z98.890       2. S/P right rotator cuff repair  Z98.890                      Subjective: Patient reports feeling sore for a day following last visit.     Objective: See treatment diary below    Assessment: Improved dissociation noted on pulley. Pt continue to have a very hard time with AROM again gravity so added resistance to supine AROM. Continue to progress as able focusing on scapulohumeral dissociation.     Plan: Continue per plan of care.      Precautions: GERD, pituitary mass     DOS: 24 R RTCr (supraspinatus & infraspinatus), biceps tenodesis, acromioplasty   Next Protocol progression at Week 6: 24 -  no pulleys or canes until 6 weeks.     Post-Operative Rehabilitation Guidelines for Standard Rotator Cuff Tears     1-4 Weeks:   Sling Immobilization   Active ROM Elbow, Wrist and Hand   True Passive (ONLY) ROM Shoulder. NO ACTIVE MOTION.   Pendulums, Supine Elevation in Scapular plane, External Rotation to tolerance with arm at side. (emphasize ER, minimum goal 40°)   Scapular Stabilization exercises (sidelying)   Deltoid isometrics in neutral (submaximal) as ROM improves   No Pulley/Canes until 6 weeks post-op (these are active motions)     4-6 Weeks: -  Discontinue sling use.   Begin Active Assist ROM and advance to Active as Tolerated   Elevation in scapular plane and external rotation as tolerated   No Internal rotation or behind back until 6wks.   Begin Cuff Isometrics at 6wks with arm at the side     6-12 Weeks:   Active Assist to Active ROM Shoulder As Tolerated   Elevation in scapular plane and external rotation to tolerance   Begin internal rotation as tolerated   Light stretching at end ranges   Cuff Isometrics with the arm at the side     3-12 Months:   "Begin 7/31  Advance to full ROM as tolerated with passive stretching at end ranges   Advance strengthening as tolerated: isometrics ? bands ? light weights (1-5    lbs); 8-12 reps/2-3 sets per rotator cuff, deltoid, and scapular stabilizers   Limit strengthening 3x/week to avoid rotator cuff tendonitis   Begin eccentrically resisted motions, pylometrics (ex. Weighted ball toss),   proprioception (es. body blade)   Begin sports related rehab at 4 ½ months, including advanced conditioning   Return to throwing at 6 months   Throw from [employer] at 9 months   Collision sports at 9 months   MMI is usually at 12 months post-op     Access Code: [email or web address]  URL: [email or web address]  Date: 05/28/2024  Prepared by: Mariely Serrano    Manuals 8/20 8/22 8/27 8/29 9/3   8/6 8/8 8/13   Shoulder PROM (per protocol) 15' 15' 15' 15' 10'   15' 15' 15'   Shoulder PA and inf GHJ mobs gr IV   5'  5'                                  Neuro Re-Ed             RTC isometrics 10x5\" ea 10x5\" ea      10x5\" ea 10x5\" ea 10x5\" ea   Shoulder flexed on wall with scap depression and protusion 10x5\" ea 10x5\" ea      10x5\" 10x5\" 10x5\" ea   Supine resisted scap drepression with shoulder elevation   1x10 w/ SPC AAROM, 1x10 AROM 1x10 w/ SPC AAROM, 1x10 AROM         Same exercise as above but standing with band on CC, use other arm to assist AROM     3x10 blue band                     Ther Ex             Pulley flexion 20x5\" 20x5\" 20x5\" 20x5\" 20x5\"   20x5\" 20x5\" 20x5\"   Pulley abduction 20x5\" 20x5\" 20x5\" 20x5\" 20x5\"   20x5\" 20x5\" 20x5\"   Supine shoulder flex AROM 3x10 3x10 3x10 3x10 3x10 2#   3x10 3x10 3x10   Supine shoulder abd to 90 AROM 3x10 3x10 3x10 3x10    3x10 3x10 3x10   Sidelying shoulder ER 3x10 3x10 3x10 3x10 3x10   3x10 3x10 3x10   Rows Blue 3x10 Blue 3x10 Blue 3x10 Blue  3x10 Blue  3x10        TB ER Y 3x10 Y   3x10 Y   3x10 Y  3x10         TB IR Y 3x10 Y   3x10 Y   3x10 Y  3x10 Y  3x10        Ther Activity                    "                                 Gait Training                                       Modalities             Ice

## 2024-09-05 ENCOUNTER — EVALUATION (OUTPATIENT)
Dept: PHYSICAL THERAPY | Facility: CLINIC | Age: 69
End: 2024-09-05
Payer: COMMERCIAL

## 2024-09-05 DIAGNOSIS — Z98.890 S/P RIGHT ROTATOR CUFF REPAIR: ICD-10-CM

## 2024-09-05 DIAGNOSIS — Z98.890 STATUS POST RIGHT ROTATOR CUFF REPAIR: Primary | ICD-10-CM

## 2024-09-05 PROCEDURE — 97140 MANUAL THERAPY 1/> REGIONS: CPT

## 2024-09-05 PROCEDURE — 97110 THERAPEUTIC EXERCISES: CPT

## 2024-09-05 NOTE — PROGRESS NOTES
PT Re-Evaluation     Today's date: 2024  Patient name: Solomon Meyers  : 1955  MRN: [id number]  Referring provider: Enmanuel Tyson PA*  Dx:   Encounter Diagnosis     ICD-10-CM    1. Status post right rotator cuff repair  Z98.890       2. S/P right rotator cuff repair  Z98.890                      Subjective: Patient reports he continues to tolerate exercises well, but still has a very hard time raising the arm up and using it for daily activities.     Objective: See treatment diary below      Supine AROM (gravity eliminated)  Flexion: 145 deg  Abduction: 102 deg    Sidelying ER AROM: to 0 deg    Standing AROM  Flexion: 60 degrees (arm straight)  Flexion: 74 degrees (elbow bent)  Abduction: 58 degrees  ER: 30    PROM:  Flexion to 156 degrees  Abduction to 118 degrees  ER to 58 degrees    R shoulder MMT  Flexion: 3-  Abduction: 2+  ER: 3-  IR: 3-    Assessment: Solomon continues to make progress in his AROM and PROM measurements as noted in objective measurements below; however he continues to be severely limited compared to normal ranges. We have been working on strengthening as well but because of poor motor control, the effect to the target musculature is questionable. Solomon requires continued skilled physical therapy in order to normalize his range of motion, improve his neuro motor control, improve his strength and return to his prior level of function.     Plan: Goals  STGs (to be achieved within 2-4 weeks)  1. Pt will report no >2/10 pain w/ all activities to indicate a significant improvement in activity tolerance and pain. MET  2. Pt will be educated in and demonstrate good understanding of post-op protocol and only engage in activities appropriate for current phase while outside of PT. MET     LTGs (to be achieved within 6-8 weeks)   1. Pt will be I with HEP at d/c to promote PT carry-over. MET  2. Pt will meet FOTO predicted score.  NOT YET MET  3. Pt will improve R shoulder ROM to WNL to  increase ease w/ functional mobility.  NOT YET MET  4. Pt will improve RUE strength to 4+/5 or greater to increase ease w/ functional mobility. NOT YET MET        Plan  Planned modality interventions: unattended electrical stimulation, ultrasound, traction, thermotherapy: hydrocollator packs, low level laser therapy and cryotherapy     Planned therapy interventions: manual therapy, neuromuscular re-education, therapeutic activities, therapeutic exercise and gait training     Frequency: 2x week  Duration in weeks: 12  Plan of Care beginning date: 9/5/2024  Plan of Care expiration date: 11/28/2024  Treatment plan discussed with: patient     Precautions: GERD, pituitary mass     DOS: 5/8/24 R RTCr (supraspinatus & infraspinatus), biceps tenodesis, acromioplasty   Next Protocol progression at Week 6: 6/19/24 -  no pulleys or canes until 6 weeks.     Post-Operative Rehabilitation Guidelines for Standard Rotator Cuff Tears     1-4 Weeks:   Sling Immobilization   Active ROM Elbow, Wrist and Hand   True Passive (ONLY) ROM Shoulder. NO ACTIVE MOTION.   Pendulums, Supine Elevation in Scapular plane, External Rotation to tolerance with arm at side. (emphasize ER, minimum goal 40°)   Scapular Stabilization exercises (sidelying)   Deltoid isometrics in neutral (submaximal) as ROM improves   No Pulley/Canes until 6 weeks post-op (these are active motions)     4-6 Weeks: 6/5-6/19  Discontinue sling use.   Begin Active Assist ROM and advance to Active as Tolerated   Elevation in scapular plane and external rotation as tolerated   No Internal rotation or behind back until 6wks.   Begin Cuff Isometrics at 6wks with arm at the side     6-12 Weeks:   Active Assist to Active ROM Shoulder As Tolerated   Elevation in scapular plane and external rotation to tolerance   Begin internal rotation as tolerated   Light stretching at end ranges   Cuff Isometrics with the arm at the side     3-12 Months:  Begin 7/31  Advance to full ROM as  "tolerated with passive stretching at end ranges   Advance strengthening as tolerated: isometrics ? bands ? light weights (1-5    lbs); 8-12 reps/2-3 sets per rotator cuff, deltoid, and scapular stabilizers   Limit strengthening 3x/week to avoid rotator cuff tendonitis   Begin eccentrically resisted motions, pylometrics (ex. Weighted ball toss),   proprioception (es. body blade)   Begin sports related rehab at 4 ½ months, including advanced conditioning   Return to throwing at 6 months   [facility] at 9 months   Collision sports at 9 months   MMI is usually at 12 months post-op     Access Code: [email or web address]  URL: [email or web address]  Date: 05/28/2024  Prepared by: Mariely Serrano       Manuals 8/20 8/22 8/27 8/29 9/3 9/5  8/6 8/8 8/13   Shoulder PROM (per protocol) 15' 15' 15' 15' 10' 10'  15' 15' 15'   Shoulder PA and inf GHJ mobs gr IV   5'  5' 5'                                 Neuro Re-Ed             RTC isometrics 10x5\" ea 10x5\" ea      10x5\" ea 10x5\" ea 10x5\" ea   Shoulder flexed on wall with scap depression and protusion 10x5\" ea 10x5\" ea      10x5\" 10x5\" 10x5\" ea   Supine resisted scap drepression with shoulder elevation   1x10 w/ SPC AAROM, 1x10 AROM 1x10 w/ SPC AAROM, 1x10 AROM         Same exercise as above but standing with band on CC, use other arm to assist AROM     3x10 blue band 3x10 blue band                    Ther Ex             Pulley flexion 20x5\" 20x5\" 20x5\" 20x5\" 20x5\" 20x5\"  20x5\" 20x5\" 20x5\"   Pulley abduction 20x5\" 20x5\" 20x5\" 20x5\" 20x5\" 20x5\"  20x5\" 20x5\" 20x5\"   Supine shoulder flex AROM 3x10 3x10 3x10 3x10 3x10 2# 3x10 2#  3x10 3x10 3x10   Supine shoulder abd to 90 AROM 3x10 3x10 3x10 3x10    3x10 3x10 3x10   Sidelying shoulder ER 3x10 3x10 3x10 3x10 3x10 3x10  3x10 3x10 3x10   Rows Blue 3x10 Blue 3x10 Blue 3x10 Blue  3x10 Blue  3x10 Blue  3x10       TB ER Y 3x10 Y   3x10 Y   3x10 Y  3x10         TB IR Y 3x10 Y   3x10 Y   3x10 Y  3x10 Y  3x10 Y  3x10       Ther " Activity                                                    Gait Training                                       Modalities             Ice

## 2024-09-09 ENCOUNTER — OFFICE VISIT (OUTPATIENT)
Dept: PHYSICAL THERAPY | Facility: CLINIC | Age: 69
End: 2024-09-09
Payer: COMMERCIAL

## 2024-09-09 DIAGNOSIS — Z98.890 STATUS POST RIGHT ROTATOR CUFF REPAIR: Primary | ICD-10-CM

## 2024-09-09 DIAGNOSIS — Z98.890 S/P RIGHT ROTATOR CUFF REPAIR: ICD-10-CM

## 2024-09-09 PROCEDURE — 97140 MANUAL THERAPY 1/> REGIONS: CPT

## 2024-09-09 PROCEDURE — 97112 NEUROMUSCULAR REEDUCATION: CPT

## 2024-09-09 PROCEDURE — 97110 THERAPEUTIC EXERCISES: CPT

## 2024-09-09 NOTE — PROGRESS NOTES
Daily Note     Today's date: 2024  Patient name: Solomon Meyers  : 1955  MRN: [id number]  Referring provider: Enmanuel Tyson PA*  Dx:   Encounter Diagnosis     ICD-10-CM    1. Status post right rotator cuff repair  Z98.890       2. S/P right rotator cuff repair  Z98.890           Start Time: 1400  Stop Time: 1440  Total time in clinic (min): 40 minutes    Subjective: Patient reports no pain and slightly improved AROM.     Objective: See treatment diary below    Assessment: Tactile cueing required to decrease UT compensation during AROM and AAROM exercises. PROM is improving and close to normal limits.     Plan: Continue per plan of care.      Precautions: GERD, pituitary mass     DOS: 24 R RTCr (supraspinatus & infraspinatus), biceps tenodesis, acromioplasty   Next Protocol progression at Week 6: 24 -  no pulleys or canes until 6 weeks.     Post-Operative Rehabilitation Guidelines for Standard Rotator Cuff Tears     1-4 Weeks:   Sling Immobilization   Active ROM Elbow, Wrist and Hand   True Passive (ONLY) ROM Shoulder. NO ACTIVE MOTION.   Pendulums, Supine Elevation in Scapular plane, External Rotation to tolerance with arm at side. (emphasize ER, minimum goal 40°)   Scapular Stabilization exercises (sidelying)   Deltoid isometrics in neutral (submaximal) as ROM improves   No Pulley/Canes until 6 weeks post-op (these are active motions)     4-6 Weeks: -  Discontinue sling use.   Begin Active Assist ROM and advance to Active as Tolerated   Elevation in scapular plane and external rotation as tolerated   No Internal rotation or behind back until 6wks.   Begin Cuff Isometrics at 6wks with arm at the side     6-12 Weeks:   Active Assist to Active ROM Shoulder As Tolerated   Elevation in scapular plane and external rotation to tolerance   Begin internal rotation as tolerated   Light stretching at end ranges   Cuff Isometrics with the arm at the side     3-12 Months:  Begin   Advance  "to full ROM as tolerated with passive stretching at end ranges   Advance strengthening as tolerated: isometrics ? bands ? light weights (1-5    lbs); 8-12 reps/2-3 sets per rotator cuff, deltoid, and scapular stabilizers   Limit strengthening 3x/week to avoid rotator cuff tendonitis   Begin eccentrically resisted motions, pylometrics (ex. Weighted ball toss),   proprioception (es. body blade)   Begin sports related rehab at 4 ½ months, including advanced conditioning   Return to throwing at 6 months   Throw from [employer] at 9 months   Collision sports at 9 months   MMI is usually at 12 months post-op     Access Code: [email or web address]  URL: [email or web address]  Date: 05/28/2024  Prepared by: Mariely Serrano    Manuals 8/20 8/22 8/27 8/29 9/3 9/9       Shoulder PROM (per protocol) 15' 15' 15' 15' 10' 15'       Shoulder PA and inf GHJ mobs gr IV   5'  5'                                  Neuro Re-Ed             RTC isometrics 10x5\" ea 10x5\" ea           Shoulder flexed on wall with scap depression and protusion 10x5\" ea 10x5\" ea           Supine resisted scap drepression with shoulder elevation   1x10 w/ SPC AAROM, 1x10 AROM 1x10 w/ SPC AAROM, 1x10 AROM         Same exercise as above but standing with band on CC, use other arm to assist AROM     3x10 blue band 3x10 blue band                    Ther Ex             Pulley flexion 20x5\" 20x5\" 20x5\" 20x5\" 20x5\" 20x5\"       Pulley abduction 20x5\" 20x5\" 20x5\" 20x5\" 20x5\" 20x5\"       Supine shoulder flex AROM 3x10 3x10 3x10 3x10 3x10 2# 3x10  2#       Supine shoulder abd to 90 AROM 3x10 3x10 3x10 3x10         Sidelying shoulder ER 3x10 3x10 3x10 3x10 3x10 3x10       Rows Blue 3x10 Blue 3x10 Blue 3x10 Blue  3x10 Blue  3x10 Blue  3x10       TB ER Y 3x10 Y   3x10 Y   3x10 Y  3x10  YTB  3x10       TB IR Y 3x10 Y   3x10 Y   3x10 Y  3x10 Y  3x10 YTB  3x10       Ther Activity                                                    Gait Training                                "        Modalities             Ice

## 2024-09-11 ENCOUNTER — OFFICE VISIT (OUTPATIENT)
Dept: PHYSICAL THERAPY | Facility: CLINIC | Age: 69
End: 2024-09-11
Payer: COMMERCIAL

## 2024-09-11 DIAGNOSIS — Z98.890 STATUS POST RIGHT ROTATOR CUFF REPAIR: Primary | ICD-10-CM

## 2024-09-11 DIAGNOSIS — Z98.890 S/P RIGHT ROTATOR CUFF REPAIR: ICD-10-CM

## 2024-09-11 PROCEDURE — 97112 NEUROMUSCULAR REEDUCATION: CPT

## 2024-09-11 PROCEDURE — 97110 THERAPEUTIC EXERCISES: CPT

## 2024-09-11 PROCEDURE — 97140 MANUAL THERAPY 1/> REGIONS: CPT

## 2024-09-11 NOTE — PROGRESS NOTES
Daily Note     Today's date: 2024  Patient name: Solomon Meyers  : 1955  MRN: [id number]  Referring provider: Enmanuel Tyson PA*  Dx:   Encounter Diagnosis     ICD-10-CM    1. Status post right rotator cuff repair  Z98.890       2. S/P right rotator cuff repair  Z98.890           Start Time: 0930  Stop Time: 1015  Total time in clinic (min): 45 minutes    Subjective: Patient reports no pain and slightly improved AROM. Minimal soreness following last visit.    Objective: See treatment diary below    Assessment: Tactile cueing required to decrease UT compensation during AROM and AAROM exercises. PROM is improving and close to normal limits. Tactile cueing required to maintain proper strengthening form.     Plan: Continue per plan of care.      Precautions: GERD, pituitary mass     DOS: 24 R RTCr (supraspinatus & infraspinatus), biceps tenodesis, acromioplasty   Next Protocol progression at Week 6: 24 -  no pulleys or canes until 6 weeks.     Post-Operative Rehabilitation Guidelines for Standard Rotator Cuff Tears     1-4 Weeks:   Sling Immobilization   Active ROM Elbow, Wrist and Hand   True Passive (ONLY) ROM Shoulder. NO ACTIVE MOTION.   Pendulums, Supine Elevation in Scapular plane, External Rotation to tolerance with arm at side. (emphasize ER, minimum goal 40°)   Scapular Stabilization exercises (sidelying)   Deltoid isometrics in neutral (submaximal) as ROM improves   No Pulley/Canes until 6 weeks post-op (these are active motions)     4-6 Weeks: -  Discontinue sling use.   Begin Active Assist ROM and advance to Active as Tolerated   Elevation in scapular plane and external rotation as tolerated   No Internal rotation or behind back until 6wks.   Begin Cuff Isometrics at 6wks with arm at the side     6-12 Weeks:   Active Assist to Active ROM Shoulder As Tolerated   Elevation in scapular plane and external rotation to tolerance   Begin internal rotation as tolerated   Light  "stretching at end ranges   Cuff Isometrics with the arm at the side     3-12 Months:  Begin 7/31  Advance to full ROM as tolerated with passive stretching at end ranges   Advance strengthening as tolerated: isometrics ? bands ? light weights (1-5    lbs); 8-12 reps/2-3 sets per rotator cuff, deltoid, and scapular stabilizers   Limit strengthening 3x/week to avoid rotator cuff tendonitis   Begin eccentrically resisted motions, pylometrics (ex. Weighted ball toss),   proprioception (es. body blade)   Begin sports related rehab at 4 ½ months, including advanced conditioning   Return to throwing at 6 months   Throw from [employer] at 9 months   Collision sports at 9 months   MMI is usually at 12 months post-op     Access Code: [email or web address]  URL: [email or web address]  Date: 05/28/2024  Prepared by: Mariely Serrano    Manuals 8/20 8/22 8/27 8/29 9/3 9/9 9/11      Shoulder PROM (per protocol) 15' 15' 15' 15' 10' 15' 15'      Shoulder PA and inf GHJ mobs gr IV   5'  5'                                  Neuro Re-Ed             RTC isometrics 10x5\" ea 10x5\" ea           Shoulder flexed on wall with scap depression and protusion 10x5\" ea 10x5\" ea           Supine resisted scap drepression with shoulder elevation   1x10 w/ SPC AAROM, 1x10 AROM 1x10 w/ SPC AAROM, 1x10 AROM         Same exercise as above but standing with band on CC, use other arm to assist AROM     3x10 blue band 3x10 blue band 3x10 blue band                   Ther Ex             Pulley flexion 20x5\" 20x5\" 20x5\" 20x5\" 20x5\" 20x5\" 20x5\"      Pulley abduction 20x5\" 20x5\" 20x5\" 20x5\" 20x5\" 20x5\" 20x5\"      Supine shoulder flex AROM 3x10 3x10 3x10 3x10 3x10 2# 3x10  2# 3x10  2#      Supine shoulder abd to 90 AROM 3x10 3x10 3x10 3x10         Sidelying shoulder ER 3x10 3x10 3x10 3x10 3x10 3x10 3x10      Rows Blue 3x10 Blue 3x10 Blue 3x10 Blue  3x10 Blue  3x10 Blue  3x10 Blue  3x10      TB ER Y 3x10 Y   3x10 Y   3x10 Y  3x10  YTB  3x10 YTB  3x10      TB " IR Y 3x10 Y   3x10 Y   3x10 Y  3x10 Y  3x10 YTB  3x10 YTB  3x10      Ther Activity                                                    Gait Training                                       Modalities             Ice

## 2024-09-17 ENCOUNTER — OFFICE VISIT (OUTPATIENT)
Dept: PHYSICAL THERAPY | Facility: CLINIC | Age: 69
End: 2024-09-17
Payer: COMMERCIAL

## 2024-09-17 DIAGNOSIS — Z98.890 STATUS POST RIGHT ROTATOR CUFF REPAIR: Primary | ICD-10-CM

## 2024-09-17 DIAGNOSIS — Z98.890 S/P RIGHT ROTATOR CUFF REPAIR: ICD-10-CM

## 2024-09-17 PROCEDURE — 97112 NEUROMUSCULAR REEDUCATION: CPT

## 2024-09-17 PROCEDURE — 97110 THERAPEUTIC EXERCISES: CPT

## 2024-09-17 PROCEDURE — 97140 MANUAL THERAPY 1/> REGIONS: CPT

## 2024-09-17 NOTE — PROGRESS NOTES
Daily Note     Today's date: 2024  Patient name: Solomon Meyers  : 1955  MRN: [id number]  Referring provider: Enmanuel Tyson PA*  Dx:   Encounter Diagnosis     ICD-10-CM    1. Status post right rotator cuff repair  Z98.890       2. S/P right rotator cuff repair  Z98.890           Start Time: 0800  Stop Time: 0855  Total time in clinic (min): 55 minutes    Subjective: Patient reports being a little bit sore over the weekend. No complaints of pain. Notes his AROM is getting better.     Objective: See treatment diary below    Assessment: Tactile cueing required to decrease UT compensation during TE, AROM and AAROM exercises. PROM is improving and close to normal limits with less crepitus. Tactile cueing required to maintain proper strengthening form. Solomon is progressing slow but steady with therapy.     Plan: Continue per plan of care.      Precautions: GERD, pituitary mass     DOS: 24 R RTCr (supraspinatus & infraspinatus), biceps tenodesis, acromioplasty   Next Protocol progression at Week 6: 24 -  no pulleys or canes until 6 weeks.     Post-Operative Rehabilitation Guidelines for Standard Rotator Cuff Tears     1-4 Weeks:   Sling Immobilization   Active ROM Elbow, Wrist and Hand   True Passive (ONLY) ROM Shoulder. NO ACTIVE MOTION.   Pendulums, Supine Elevation in Scapular plane, External Rotation to tolerance with arm at side. (emphasize ER, minimum goal 40°)   Scapular Stabilization exercises (sidelying)   Deltoid isometrics in neutral (submaximal) as ROM improves   No Pulley/Canes until 6 weeks post-op (these are active motions)     4-6 Weeks: -  Discontinue sling use.   Begin Active Assist ROM and advance to Active as Tolerated   Elevation in scapular plane and external rotation as tolerated   No Internal rotation or behind back until 6wks.   Begin Cuff Isometrics at 6wks with arm at the side     6-12 Weeks:   Active Assist to Active ROM Shoulder As Tolerated   Elevation in  "scapular plane and external rotation to tolerance   Begin internal rotation as tolerated   Light stretching at end ranges   Cuff Isometrics with the arm at the side     3-12 Months:  Begin 7/31  Advance to full ROM as tolerated with passive stretching at end ranges   Advance strengthening as tolerated: isometrics ? bands ? light weights (1-5    lbs); 8-12 reps/2-3 sets per rotator cuff, deltoid, and scapular stabilizers   Limit strengthening 3x/week to avoid rotator cuff tendonitis   Begin eccentrically resisted motions, pylometrics (ex. Weighted ball toss),   proprioception (es. body blade)   Begin sports related rehab at 4 ½ months, including advanced conditioning   Return to throwing at 6 months   Throw from pitcher's mound at 9 months   Collision sports at 9 months   MMI is usually at 12 months post-op     Access Code: [email or web address]  URL: [email or web address]  Date: 05/28/2024  Prepared by: Mariely Serrano    Manuals 8/20 8/22 8/27 8/29 9/3 9/9 9/11 9/17     Shoulder PROM (per protocol) 15' 15' 15' 15' 10' 15' 15' 20'     Shoulder PA and inf GHJ mobs gr IV   5'  5'                                  Neuro Re-Ed             RTC isometrics 10x5\" ea 10x5\" ea           Shoulder flexed on wall with scap depression and protusion 10x5\" ea 10x5\" ea           Supine resisted scap drepression with shoulder elevation   1x10 w/ SPC AAROM, 1x10 AROM 1x10 w/ SPC AAROM, 1x10 AROM         Same exercise as above but standing with band on CC, use other arm to assist AROM     3x10 blue band 3x10 blue band 3x10 blue band 3x10 blue band                  Ther Ex             Pulley flexion 20x5\" 20x5\" 20x5\" 20x5\" 20x5\" 20x5\" 20x5\" 20x5\"     Pulley abduction 20x5\" 20x5\" 20x5\" 20x5\" 20x5\" 20x5\" 20x5\" 20x5\"     Supine shoulder flex AROM 3x10 3x10 3x10 3x10 3x10 2# 3x10  2# 3x10  2# 3x12  2#     Supine shoulder abd to 90 AROM 3x10 3x10 3x10 3x10         Sidelying shoulder ER 3x10 3x10 3x10 3x10 3x10 3x10 3x10 3x10     Rows Blue " 3x10 Blue 3x10 Blue 3x10 Blue  3x10 Blue  3x10 Blue  3x10 Blue  3x10 Blue  3x10     TB ER Y 3x10 Y   3x10 Y   3x10 Y  3x10  YTB  3x10 YTB  3x10 YTB  3x10     TB IR Y 3x10 Y   3x10 Y   3x10 Y  3x10 Y  3x10 YTB  3x10 YTB  3x10 YTB  3x10     Ther Activity                                                    Gait Training                                       Modalities             Ice

## 2024-09-19 ENCOUNTER — OFFICE VISIT (OUTPATIENT)
Dept: PHYSICAL THERAPY | Facility: CLINIC | Age: 69
End: 2024-09-19
Payer: COMMERCIAL

## 2024-09-19 DIAGNOSIS — Z98.890 S/P RIGHT ROTATOR CUFF REPAIR: ICD-10-CM

## 2024-09-19 DIAGNOSIS — Z98.890 STATUS POST RIGHT ROTATOR CUFF REPAIR: Primary | ICD-10-CM

## 2024-09-19 PROCEDURE — 97110 THERAPEUTIC EXERCISES: CPT

## 2024-09-19 NOTE — PROGRESS NOTES
Daily Note     Today's date: 2024  Patient name: Solomon Meyers  : 1955  MRN: [id number]  Referring provider: Enmanuel Tyson PA*  Dx:   Encounter Diagnosis     ICD-10-CM    1. Status post right rotator cuff repair  Z98.890       2. S/P right rotator cuff repair  Z98.890                      Subjective: Patient reports lifting his arm overhead is still challenging but slowly improving.      Objective: See treatment diary below    Assessment: Pt's RTC strength is slowly progressing. He continues to require cueing for avoiding scapular elevation with shoulder elevation AROM but neuromuscular control is increasing.     Plan: Continue per plan of care.      Precautions: GERD, pituitary mass     DOS: 24 R RTCr (supraspinatus & infraspinatus), biceps tenodesis, acromioplasty   Next Protocol progression at Week 6: 24 -  no pulleys or canes until 6 weeks.     Post-Operative Rehabilitation Guidelines for Standard Rotator Cuff Tears     1-4 Weeks:   Sling Immobilization   Active ROM Elbow, Wrist and Hand   True Passive (ONLY) ROM Shoulder. NO ACTIVE MOTION.   Pendulums, Supine Elevation in Scapular plane, External Rotation to tolerance with arm at side. (emphasize ER, minimum goal 40°)   Scapular Stabilization exercises (sidelying)   Deltoid isometrics in neutral (submaximal) as ROM improves   No Pulley/Canes until 6 weeks post-op (these are active motions)     4-6 Weeks: -  Discontinue sling use.   Begin Active Assist ROM and advance to Active as Tolerated   Elevation in scapular plane and external rotation as tolerated   No Internal rotation or behind back until 6wks.   Begin Cuff Isometrics at 6wks with arm at the side     6-12 Weeks:   Active Assist to Active ROM Shoulder As Tolerated   Elevation in scapular plane and external rotation to tolerance   Begin internal rotation as tolerated   Light stretching at end ranges   Cuff Isometrics with the arm at the side     3-12 Months:  Begin  "7/31  Advance to full ROM as tolerated with passive stretching at end ranges   Advance strengthening as tolerated: isometrics ? bands ? light weights (1-5    lbs); 8-12 reps/2-3 sets per rotator cuff, deltoid, and scapular stabilizers   Limit strengthening 3x/week to avoid rotator cuff tendonitis   Begin eccentrically resisted motions, pylometrics (ex. Weighted ball toss),   proprioception (es. body blade)   Begin sports related rehab at 4 ½ months, including advanced conditioning   Return to throwing at 6 months   Throw from [employer] at 9 months   Collision sports at 9 months   MMI is usually at 12 months post-op     Access Code: [email or web address]  URL: [email or web address]  Date: 05/28/2024  Prepared by: Mariely Serrano    Manuals 8/20 8/22 8/27 8/29 9/3 9/9 9/11 9/17 9/19    Shoulder PROM (per protocol) 15' 15' 15' 15' 10' 15' 15' 20' 15'    Shoulder PA and inf GHJ mobs gr IV   5'  5'                                  Neuro Re-Ed             RTC isometrics 10x5\" ea 10x5\" ea           Shoulder flexed on wall with scap depression and protusion 10x5\" ea 10x5\" ea           Supine resisted scap drepression with shoulder elevation   1x10 w/ SPC AAROM, 1x10 AROM 1x10 w/ SPC AAROM, 1x10 AROM         Same exercise as above but standing with band on CC, use other arm to assist AROM     3x10 blue band 3x10 blue band 3x10 blue band 3x10 blue band 3x10 blue band                 Ther Ex             Pulley flexion 20x5\" 20x5\" 20x5\" 20x5\" 20x5\" 20x5\" 20x5\" 20x5\" 20x5\"    Pulley abduction 20x5\" 20x5\" 20x5\" 20x5\" 20x5\" 20x5\" 20x5\" 20x5\" 20x5\"    Supine shoulder flex AROM 3x10 3x10 3x10 3x10 3x10 2# 3x10  2# 3x10  2# 3x12  2# 3x12  2#    Supine shoulder abd to 90 AROM 3x10 3x10 3x10 3x10         Sidelying shoulder ER 3x10 3x10 3x10 3x10 3x10 3x10 3x10 3x10 3x12    Rows Blue 3x10 Blue 3x10 Blue 3x10 Blue  3x10 Blue  3x10 Blue  3x10 Blue  3x10 Blue  3x10 Blue  3x15    TB ER Y 3x10 Y   3x10 Y   3x10 Y  3x10  YTB  3x10 " YTB  3x10 YTB  3x10 YTB  3x10    TB IR Y 3x10 Y   3x10 Y   3x10 Y  3x10 Y  3x10 YTB  3x10 YTB  3x10 YTB  3x10 YTB  3x10    Ther Activity                                                    Gait Training                                       Modalities             Ice

## 2024-09-24 ENCOUNTER — OFFICE VISIT (OUTPATIENT)
Dept: PHYSICAL THERAPY | Facility: CLINIC | Age: 69
End: 2024-09-24
Payer: COMMERCIAL

## 2024-09-24 DIAGNOSIS — Z98.890 S/P RIGHT ROTATOR CUFF REPAIR: ICD-10-CM

## 2024-09-24 DIAGNOSIS — Z98.890 STATUS POST RIGHT ROTATOR CUFF REPAIR: Primary | ICD-10-CM

## 2024-09-24 PROCEDURE — 97112 NEUROMUSCULAR REEDUCATION: CPT

## 2024-09-24 PROCEDURE — 97110 THERAPEUTIC EXERCISES: CPT

## 2024-09-24 NOTE — PROGRESS NOTES
Daily Note     Today's date: 2024  Patient name: Solomon Meyers  : 1955  MRN: [id number]  Referring provider: Enmanuel Tyson PA*  Dx:   Encounter Diagnosis     ICD-10-CM    1. Status post right rotator cuff repair  Z98.890       2. S/P right rotator cuff repair  Z98.890           Start Time: 1400  Stop Time: 1445  Total time in clinic (min): 45 minutes    Subjective: Patient reports being sore this afternoon from using his arm more at home. Slight improvement with OH movement.     Objective: See treatment diary below    Assessment: Pt's RTC strength is slowly progressing. He continues to require cueing for avoiding scapular elevation with shoulder elevation AROM but neuromuscular control is increasing. Good response to the addition of AAROM con/ecc FF.     Plan: Continue per plan of care.      Precautions: GERD, pituitary mass     DOS: 24 R RTCr (supraspinatus & infraspinatus), biceps tenodesis, acromioplasty   Next Protocol progression at Week 6: 24 -  no pulleys or canes until 6 weeks.     Post-Operative Rehabilitation Guidelines for Standard Rotator Cuff Tears     1-4 Weeks:   Sling Immobilization   Active ROM Elbow, Wrist and Hand   True Passive (ONLY) ROM Shoulder. NO ACTIVE MOTION.   Pendulums, Supine Elevation in Scapular plane, External Rotation to tolerance with arm at side. (emphasize ER, minimum goal 40°)   Scapular Stabilization exercises (sidelying)   Deltoid isometrics in neutral (submaximal) as ROM improves   No Pulley/Canes until 6 weeks post-op (these are active motions)     4-6 Weeks: -  Discontinue sling use.   Begin Active Assist ROM and advance to Active as Tolerated   Elevation in scapular plane and external rotation as tolerated   No Internal rotation or behind back until 6wks.   Begin Cuff Isometrics at 6wks with arm at the side     6-12 Weeks:   Active Assist to Active ROM Shoulder As Tolerated   Elevation in scapular plane and external rotation to tolerance  "  Begin internal rotation as tolerated   Light stretching at end ranges   Cuff Isometrics with the arm at the side     3-12 Months:  Begin 7/31  Advance to full ROM as tolerated with passive stretching at end ranges   Advance strengthening as tolerated: isometrics ? bands ? light weights (1-5    lbs); 8-12 reps/2-3 sets per rotator cuff, deltoid, and scapular stabilizers   Limit strengthening 3x/week to avoid rotator cuff tendonitis   Begin eccentrically resisted motions, pylometrics (ex. Weighted ball toss),   proprioception (es. body blade)   Begin sports related rehab at 4 ½ months, including advanced conditioning   Return to throwing at 6 months   Throw from pitcher'[employer] at 9 months   Collision sports at 9 months   MMI is usually at 12 months post-op     Access Code: [email or web address]  URL: [email or web address]  Date: 05/28/2024  Prepared by: Mariely Serrano    Manuals 8/20 8/22 8/27 8/29 9/3 9/9 9/11 9/17 9/19 9/24   Shoulder PROM (per protocol) 15' 15' 15' 15' 10' 15' 15' 20' 15' 15'   Shoulder PA and inf GHJ mobs gr IV   5'  5'                                  Neuro Re-Ed             RTC isometrics 10x5\" ea 10x5\" ea           Shoulder flexed on wall with scap depression and protusion 10x5\" ea 10x5\" ea           Supine resisted scap drepression with shoulder elevation   1x10 w/ SPC AAROM, 1x10 AROM 1x10 w/ SPC AAROM, 1x10 AROM         Same exercise as above but standing with band on CC, use other arm to assist AROM     3x10 blue band 3x10 blue band 3x10 blue band 3x10 blue band 3x10 blue band 3x10  blue  band                Ther Ex             Pulley flexion 20x5\" 20x5\" 20x5\" 20x5\" 20x5\" 20x5\" 20x5\" 20x5\" 20x5\" 20x5\"   Pulley abduction 20x5\" 20x5\" 20x5\" 20x5\" 20x5\" 20x5\" 20x5\" 20x5\" 20x5\" 20x5\"   Supine shoulder flex AROM 3x10 3x10 3x10 3x10 3x10 2# 3x10  2# 3x10  2# 3x12  2# 3x12  2# 3x12  2#   Supine shoulder abd to 90 AROM 3x10 3x10 3x10 3x10         Sidelying shoulder ER 3x10 3x10 3x10 3x10 3x10 " 3x10 3x10 3x10 3x12 3x12   Rows Blue 3x10 Blue 3x10 Blue 3x10 Blue  3x10 Blue  3x10 Blue  3x10 Blue  3x10 Blue  3x10 Blue  3x15 Blue  3x15   TB ER Y 3x10 Y   3x10 Y   3x10 Y  3x10  YTB  3x10 YTB  3x10 YTB  3x10 YTB  3x10 YTB  3x10   TB IR Y 3x10 Y   3x10 Y   3x10 Y  3x10 Y  3x10 YTB  3x10 YTB  3x10 YTB  3x10 YTB  3x10 YTB  3x10   Cane FF AAROM con/ecc           1x10                 Ther Activity                                                    Gait Training                                       Modalities             Ice

## 2024-09-26 ENCOUNTER — OFFICE VISIT (OUTPATIENT)
Dept: PHYSICAL THERAPY | Facility: CLINIC | Age: 69
End: 2024-09-26
Payer: COMMERCIAL

## 2024-09-26 DIAGNOSIS — Z98.890 STATUS POST RIGHT ROTATOR CUFF REPAIR: Primary | ICD-10-CM

## 2024-09-26 DIAGNOSIS — Z98.890 S/P RIGHT ROTATOR CUFF REPAIR: ICD-10-CM

## 2024-09-26 PROCEDURE — 97110 THERAPEUTIC EXERCISES: CPT

## 2024-09-26 NOTE — PROGRESS NOTES
Daily Note     Today's date: 2024  Patient name: Solomon Meyers  : 1955  MRN: [id number]  Referring provider: Enmanuel Tyson PA*  Dx:   Encounter Diagnosis     ICD-10-CM    1. Status post right rotator cuff repair  Z98.890       2. S/P right rotator cuff repair  Z98.890                      Subjective: Patient reports slowly increasing shoulder motion when lifting overhead.     Objective: See treatment diary below    Assessment: Pt demonstrated increased RTC and scapular strength. Shoulder abduction and IR PROM is minimally limited. Shoulder AAROM mechanics are improving.    Plan: Continue per plan of care.      Precautions: GERD, pituitary mass     DOS: 24 R RTCr (supraspinatus & infraspinatus), biceps tenodesis, acromioplasty   Next Protocol progression at Week 6: 24 -  no pulleys or canes until 6 weeks.     Post-Operative Rehabilitation Guidelines for Standard Rotator Cuff Tears     1-4 Weeks:   Sling Immobilization   Active ROM Elbow, Wrist and Hand   True Passive (ONLY) ROM Shoulder. NO ACTIVE MOTION.   Pendulums, Supine Elevation in Scapular plane, External Rotation to tolerance with arm at side. (emphasize ER, minimum goal 40°)   Scapular Stabilization exercises (sidelying)   Deltoid isometrics in neutral (submaximal) as ROM improves   No Pulley/Canes until 6 weeks post-op (these are active motions)     4-6 Weeks: -  Discontinue sling use.   Begin Active Assist ROM and advance to Active as Tolerated   Elevation in scapular plane and external rotation as tolerated   No Internal rotation or behind back until 6wks.   Begin Cuff Isometrics at 6wks with arm at the side     6-12 Weeks:   Active Assist to Active ROM Shoulder As Tolerated   Elevation in scapular plane and external rotation to tolerance   Begin internal rotation as tolerated   Light stretching at end ranges   Cuff Isometrics with the arm at the side     3-12 Months:  Begin   Advance to full ROM as tolerated with  "passive stretching at end ranges   Advance strengthening as tolerated: isometrics ? bands ? light weights (1-5    lbs); 8-12 reps/2-3 sets per rotator cuff, deltoid, and scapular stabilizers   Limit strengthening 3x/week to avoid rotator cuff tendonitis   Begin eccentrically resisted motions, pylometrics (ex. Weighted ball toss),   proprioception (es. body blade)   Begin sports related rehab at 4 ½ months, including advanced conditioning   Return to throwing at 6 months   Throw from pitcher'[employer] at 9 months   Collision sports at 9 months   MMI is usually at 12 months post-op     Access Code: [email or web address]  URL: [email or web address]  Date: 05/28/2024  Prepared by: Mariely Serrano    Manuals 9/26   8/29 9/3 9/9 9/11 9/17 9/19 9/24   Shoulder PROM (per protocol) 15'   15' 10' 15' 15' 20' 15' 15'   Shoulder PA and inf GHJ mobs gr IV     5'                                  Neuro Re-Ed             RTC isometrics             Shoulder flexed on wall with scap depression and protusion             Supine resisted scap drepression with shoulder elevation    1x10 w/ SPC AAROM, 1x10 AROM         Same exercise as above but standing with band on CC, use other arm to assist AROM 3x10  blue  band    3x10 blue band 3x10 blue band 3x10 blue band 3x10 blue band 3x10 blue band 3x10  blue  band                Ther Ex             Pulley flexion 20x5\"   20x5\" 20x5\" 20x5\" 20x5\" 20x5\" 20x5\" 20x5\"   Pulley abduction 20x5\"   20x5\" 20x5\" 20x5\" 20x5\" 20x5\" 20x5\" 20x5\"   Supine shoulder flex AROM 3x15 2#   3x10 3x10 2# 3x10  2# 3x10  2# 3x12  2# 3x12  2# 3x12  2#   Supine shoulder abd to 90 AROM    3x10         Sidelying shoulder ER 3x10   1#   3x10 3x10 3x10 3x10 3x10 3x12 3x12   Rows Black  3x15   Blue  3x10 Blue  3x10 Blue  3x10 Blue  3x10 Blue  3x10 Blue  3x15 Blue  3x15   TB ER RTB  3x10   Y  3x10  YTB  3x10 YTB  3x10 YTB  3x10 YTB  3x10 YTB  3x10   TB IR RTB  3x10   Y  3x10 Y  3x10 YTB  3x10 YTB  3x10 YTB  3x10 YTB  3x10 " YTB  3x10   Cane FF AAROM con/ecc  1x10         1x10                 Ther Activity                                                    Gait Training                                       Modalities             Ice

## 2024-10-01 ENCOUNTER — OFFICE VISIT (OUTPATIENT)
Dept: PHYSICAL THERAPY | Facility: CLINIC | Age: 69
End: 2024-10-01
Payer: COMMERCIAL

## 2024-10-01 DIAGNOSIS — Z98.890 STATUS POST RIGHT ROTATOR CUFF REPAIR: Primary | ICD-10-CM

## 2024-10-01 DIAGNOSIS — Z98.890 S/P RIGHT ROTATOR CUFF REPAIR: ICD-10-CM

## 2024-10-01 PROCEDURE — 97140 MANUAL THERAPY 1/> REGIONS: CPT

## 2024-10-01 PROCEDURE — 97112 NEUROMUSCULAR REEDUCATION: CPT

## 2024-10-01 PROCEDURE — 97110 THERAPEUTIC EXERCISES: CPT

## 2024-10-01 NOTE — PROGRESS NOTES
Daily Note     Today's date: 10/1/2024  Patient name: Solomon Meyers  : 1955  MRN: [id number]  Referring provider: Enmanuel Tyson PA*  Dx:   Encounter Diagnosis     ICD-10-CM    1. Status post right rotator cuff repair  Z98.890       2. S/P right rotator cuff repair  Z98.890           Start Time: 1555  Stop Time: 1635  Total time in clinic (min): 40 minutes    Subjective: Patient reports steady progress with PROM and AROM    Objective: See treatment diary below    Assessment: Patient's PROM is improving well but takes time to stretch out during manual. Steady progress with RTC strength.     Plan: Continue per plan of care.      Precautions: GERD, pituitary mass     DOS: 24 R RTCr (supraspinatus & infraspinatus), biceps tenodesis, acromioplasty   Next Protocol progression at Week 6: 24 -  no pulleys or canes until 6 weeks.     Post-Operative Rehabilitation Guidelines for Standard Rotator Cuff Tears     1-4 Weeks:   Sling Immobilization   Active ROM Elbow, Wrist and Hand   True Passive (ONLY) ROM Shoulder. NO ACTIVE MOTION.   Pendulums, Supine Elevation in Scapular plane, External Rotation to tolerance with arm at side. (emphasize ER, minimum goal 40°)   Scapular Stabilization exercises (sidelying)   Deltoid isometrics in neutral (submaximal) as ROM improves   No Pulley/Canes until 6 weeks post-op (these are active motions)     4-6 Weeks: -  Discontinue sling use.   Begin Active Assist ROM and advance to Active as Tolerated   Elevation in scapular plane and external rotation as tolerated   No Internal rotation or behind back until 6wks.   Begin Cuff Isometrics at 6wks with arm at the side     6-12 Weeks:   Active Assist to Active ROM Shoulder As Tolerated   Elevation in scapular plane and external rotation to tolerance   Begin internal rotation as tolerated   Light stretching at end ranges   Cuff Isometrics with the arm at the side     3-12 Months:  Begin   Advance to full ROM as  "tolerated with passive stretching at end ranges   Advance strengthening as tolerated: isometrics ? bands ? light weights (1-5    lbs); 8-12 reps/2-3 sets per rotator cuff, deltoid, and scapular stabilizers   Limit strengthening 3x/week to avoid rotator cuff tendonitis   Begin eccentrically resisted motions, pylometrics (ex. Weighted ball toss),   proprioception (es. body blade)   Begin sports related rehab at 4 ½ months, including advanced conditioning   Return to throwing at 6 months   Throw from pitcher'[employer] at 9 months   Collision sports at 9 months   MMI is usually at 12 months post-op     Access Code: [email or web address]  URL: [email or web address]  Date: 05/28/2024  Prepared by: Mariely Serrano    Manuals 9/26 10/1      9/17 9/19 9/24   Shoulder PROM (per protocol) 15' 15'      20' 15' 15'   Shoulder PA and inf GHJ mobs gr IV                                       Neuro Re-Ed             RTC isometrics             Shoulder flexed on wall with scap depression and protusion             Supine resisted scap drepression with shoulder elevation             Same exercise as above but standing with band on CC, use other arm to assist AROM 3x10  blue  band 3x10  blue  Band      3x10 blue band 3x10 blue band 3x10  blue  band                Ther Ex             Pulley flexion 20x5\" 20x5\"      20x5\" 20x5\" 20x5\"   Pulley abduction 20x5\" 20x5\"      20x5\" 20x5\" 20x5\"   Supine shoulder flex AROM 3x15 2# 3x15  2#      3x12  2# 3x12  2# 3x12  2#   Supine shoulder abd to 90 AROM             Sidelying shoulder ER 3x10   1# 3x10  1#      3x10 3x12 3x12   Rows Black  3x15 Black  3x15      Blue  3x10 Blue  3x15 Blue  3x15   TB ER RTB  3x10 RTB  3x10      YTB  3x10 YTB  3x10 YTB  3x10   TB IR RTB  3x10 RTB  3x10      YTB  3x10 YTB  3x10 YTB  3x10   Cane FF AAROM con/ecc  1x10 1x10        1x10                 Ther Activity                                                    Gait Training                                     "   Modalities             Ice

## 2024-10-03 ENCOUNTER — OFFICE VISIT (OUTPATIENT)
Dept: PHYSICAL THERAPY | Facility: CLINIC | Age: 69
End: 2024-10-03
Payer: COMMERCIAL

## 2024-10-03 DIAGNOSIS — Z98.890 STATUS POST RIGHT ROTATOR CUFF REPAIR: Primary | ICD-10-CM

## 2024-10-03 DIAGNOSIS — Z98.890 S/P RIGHT ROTATOR CUFF REPAIR: ICD-10-CM

## 2024-10-03 PROCEDURE — 97110 THERAPEUTIC EXERCISES: CPT

## 2024-10-03 PROCEDURE — 97140 MANUAL THERAPY 1/> REGIONS: CPT

## 2024-10-03 NOTE — PROGRESS NOTES
Daily Note     Today's date: 10/3/2024  Patient name: Solomon Meyers  : 1955  MRN: [id number]  Referring provider: Enmanuel Tyson PA*  Dx:   Encounter Diagnosis     ICD-10-CM    1. Status post right rotator cuff repair  Z98.890       2. S/P right rotator cuff repair  Z98.890                      Subjective: Patient reports no shoulder pain.     Objective: See treatment diary below    Assessment: Patient's PROM is mildly limited in all directions with IR being the most restricted. Pt demonstrated increased tolerance to scapular and RTC strengthening. He continues to demonstrate mild-mod scapular elevation with active shoulder flexion.     Plan: Continue per plan of care.      Precautions: GERD, pituitary mass     DOS: 24 R RTCr (supraspinatus & infraspinatus), biceps tenodesis, acromioplasty   Next Protocol progression at Week 6: 24 -  no pulleys or canes until 6 weeks.     Post-Operative Rehabilitation Guidelines for Standard Rotator Cuff Tears     1-4 Weeks:   Sling Immobilization   Active ROM Elbow, Wrist and Hand   True Passive (ONLY) ROM Shoulder. NO ACTIVE MOTION.   Pendulums, Supine Elevation in Scapular plane, External Rotation to tolerance with arm at side. (emphasize ER, minimum goal 40°)   Scapular Stabilization exercises (sidelying)   Deltoid isometrics in neutral (submaximal) as ROM improves   No Pulley/Canes until 6 weeks post-op (these are active motions)     4-6 Weeks: -  Discontinue sling use.   Begin Active Assist ROM and advance to Active as Tolerated   Elevation in scapular plane and external rotation as tolerated   No Internal rotation or behind back until 6wks.   Begin Cuff Isometrics at 6wks with arm at the side     6-12 Weeks:   Active Assist to Active ROM Shoulder As Tolerated   Elevation in scapular plane and external rotation to tolerance   Begin internal rotation as tolerated   Light stretching at end ranges   Cuff Isometrics with the arm at the side     3-12  "Months:  Begin 7/31  Advance to full ROM as tolerated with passive stretching at end ranges   Advance strengthening as tolerated: isometrics ? bands ? light weights (1-5    lbs); 8-12 reps/2-3 sets per rotator cuff, deltoid, and scapular stabilizers   Limit strengthening 3x/week to avoid rotator cuff tendonitis   Begin eccentrically resisted motions, pylometrics (ex. Weighted ball toss),   proprioception (es. body blade)   Begin sports related rehab at 4 ½ months, including advanced conditioning   Return to throwing at 6 months   Throw from [employer] at 9 months   Collision sports at 9 months   MMI is usually at 12 months post-op     Access Code: [email or web address]  URL: [email or web address]  Date: 05/28/2024  Prepared by: Mariely Serrano    Manuals 9/26 10/1 10/3     9/17 9/19 9/24   Shoulder PROM (per protocol) 15' 15' 15'     20' 15' 15'   Shoulder PA and inf GHJ mobs gr IV                                       Neuro Re-Ed             RTC isometrics             Shoulder flexed on wall with scap depression and protusion             Supine resisted scap drepression with shoulder elevation             Same exercise as above but standing with band on CC, use other arm to assist AROM 3x10  blue  band 3x10  blue  Band 3x10  blue  Band     3x10 blue band 3x10 blue band 3x10  blue  band                Ther Ex             Pulley flexion 20x5\" 20x5\" 20x5\"     20x5\" 20x5\" 20x5\"   Pulley abduction 20x5\" 20x5\" 20x5\"     20x5\" 20x5\" 20x5\"   Supine shoulder flex AROM 3x15 2# 3x15  2# 3x15 2#     3x12  2# 3x12  2# 3x12  2#   Supine shoulder abd to 90 AROM             Sidelying shoulder ER 3x10   1# 3x10  1# 2#  3x10     3x10 3x12 3x12   Rows Black  3x15 Black  3x15 Silver  3x15     Blue  3x10 Blue  3x15 Blue  3x15   TB ER RTB  3x10 RTB  3x10 GTB  3x10     YTB  3x10 YTB  3x10 YTB  3x10   TB IR RTB  3x10 RTB  3x10 GTB  3x10     YTB  3x10 YTB  3x10 YTB  3x10   Cane FF AAROM con/ecc  1x10 1x10 1x10       1x10               "   Ther Activity                                                    Gait Training                                       Modalities             Ice

## 2024-10-17 ENCOUNTER — TELEPHONE (OUTPATIENT)
Dept: NEUROSURGERY | Facility: CLINIC | Age: 69
End: 2024-10-17

## 2024-10-17 NOTE — TELEPHONE ENCOUNTER
Patient needs to complete MRI prior to follow up appointment.    MRI 10/22 Cx'd, not yet rescheduled.    LMOM requesting patient call Central Scheduling to see if MRI can be rescheduled for prior to office follow up. If unable to schedule MRI prior to 10/30 office appointment, he will also need to call office to reschedule follow up. Provided both phone numbers for Central Scheduling and Office.

## 2024-10-25 ENCOUNTER — TELEPHONE (OUTPATIENT)
Age: 69
End: 2024-10-25

## 2024-10-25 NOTE — TELEPHONE ENCOUNTER
10/25/24 CALLED PT AND LMOM FOR HIM TO CB TO OFFER TO RESCHEDULE CANCELLED 1 YR F/U. PT NEEDS TO SCHEDULE MRI BRAIN PIT (ORDERED) PRIOR TO APPT. IF PT WOULD LIKE TO RESCHEDULE TESTING AND APPT, PLEASE REACH OUT TO [facility] CLINICAL STAFF TO ADD NEW MRI BRAIN PIT SCRIPT SINCE ONE ORDERED IN CHART  10/23/24, PT WILL NEED NEW ORDER TO SCHEDULE.    Appointment canceled for Solomon Meyers ([id number])   Visit type: OFFICE VISIT LONG [facility]   10/30/2024 8:00 AM (45 minutes) with Dilcia Farrell PA-C in [facility] [place]      Reason for cancellation: Canceled via MyChart

## 2025-01-29 ENCOUNTER — TELEPHONE (OUTPATIENT)
Dept: NEUROSURGERY | Facility: CLINIC | Age: 70
End: 2025-01-29

## 2025-01-29 NOTE — TELEPHONE ENCOUNTER
Called left  requesting a call back in to reschedule cancelled 1 year follow up appointment with mri  waiting for call back

## 2025-04-11 ENCOUNTER — TELEPHONE (OUTPATIENT)
Dept: INTERNAL MEDICINE CLINIC | Facility: CLINIC | Age: 70
End: 2025-04-11

## 2025-04-11 NOTE — TELEPHONE ENCOUNTER
Called patient to see if he had a diabetic foot exam done outside network so we can update HM or if not see if he would like to schedule a follow up with pcp or nurse visit appointment,

## 2025-04-25 ENCOUNTER — VBI (OUTPATIENT)
Dept: ADMINISTRATIVE | Facility: OTHER | Age: 70
End: 2025-04-25

## 2025-04-25 NOTE — TELEPHONE ENCOUNTER
Patient contacted to schedule Annual Wellness Visit.   Patient moved out of area/state.     Thank you.  Yadira Osullivan  [facility]

## (undated) DEVICE — NEEDLE SUT SCORPION MEGALOADER

## (undated) DEVICE — GLOVE INDICATOR PI UNDERGLOVE SZ 8 BLUE

## (undated) DEVICE — 3M™ STERI-DRAPE™ U-DRAPE 1015: Brand: STERI-DRAPE™

## (undated) DEVICE — PROBE ABLATION  APOLLO RF ASPIRING 90 DEG

## (undated) DEVICE — CANNULA 7 X70MM THRD SEAL SIDE PORT

## (undated) DEVICE — INTENDED FOR TISSUE SEPARATION, AND OTHER PROCEDURES THAT REQUIRE A SHARP SURGICAL BLADE TO PUNCTURE OR CUT.: Brand: BARD-PARKER ® CARBON RIB-BACK BLADES

## (undated) DEVICE — KERLIX BANDAGE ROLL: Brand: KERLIX

## (undated) DEVICE — ARTHROSCOPY FLOOR MAT

## (undated) DEVICE — IMPERVIOUS STOCKINETTE: Brand: DEROYAL

## (undated) DEVICE — CHLORAPREP HI-LITE 26ML ORANGE

## (undated) DEVICE — OCCLUSIVE GAUZE STRIP,3% BISMUTH TRIBROMOPHENATE IN PETROLATUM BLEND: Brand: XEROFORM

## (undated) DEVICE — 3M™ STERI-STRIP™ REINFORCED ADHESIVE SKIN CLOSURES, R1547, 1/2 IN X 4 IN (12 MM X 100 MM), 6 STRIPS/ENVELOPE: Brand: 3M™ STERI-STRIP™

## (undated) DEVICE — TUBING SUCTION 5MM X 12 FT

## (undated) DEVICE — BAG POLY CLEAR 12 X 8 X 30

## (undated) DEVICE — T-MAX DISPOSABLE FACE MASK 8 PER BOX

## (undated) DEVICE — GAUZE SPONGES,16 PLY: Brand: CURITY

## (undated) DEVICE — BLADE SHAVER DISSECTOR 5MM 13CM COOLCUT

## (undated) DEVICE — THE EXACTO COLD SNARE IS INTENDED TO BE USED WITHOUT DIATHERMIC ENERGY FOR THE ENDOSCOPIC RESECTION OF POLYP TISSUE IN THE GASTROINTESTINAL TRACT.: Brand: EXACTO

## (undated) DEVICE — SPONGE SCRUB 4 PCT CHLORHEXIDINE

## (undated) DEVICE — SUT ETHILON 3-0 PS-1 18 IN 1663G

## (undated) DEVICE — ABDOMINAL PAD: Brand: DERMACEA

## (undated) DEVICE — SUTURETAPE 1.3MM W/CLOSED LOOP BLUE/WHITE AR-7535

## (undated) DEVICE — 3M™ IOBAN™ 2 ANTIMICROBIAL INCISE DRAPE 6650EZ: Brand: IOBAN™ 2

## (undated) DEVICE — SYRINGE 3ML LL

## (undated) DEVICE — TUBING ARTHROSCOPIC WAVE  MAIN PUMP

## (undated) DEVICE — PACK PBDS SHOULDER ARTHROSCOPY RF

## (undated) DEVICE — GLOVE SRG BIOGEL 7.5

## (undated) DEVICE — POSITIONER TRIMANO LIMB BEACH CHAIR

## (undated) DEVICE — PROBE ABLATION  APOLLO RF 90 DEG MULTI PORT